# Patient Record
Sex: FEMALE | Race: WHITE | NOT HISPANIC OR LATINO | Employment: OTHER | RURAL
[De-identification: names, ages, dates, MRNs, and addresses within clinical notes are randomized per-mention and may not be internally consistent; named-entity substitution may affect disease eponyms.]

---

## 2021-05-25 DIAGNOSIS — M19.90 OSTEOARTHRITIS: Primary | ICD-10-CM

## 2021-06-03 ENCOUNTER — CLINICAL SUPPORT (OUTPATIENT)
Dept: REHABILITATION | Facility: HOSPITAL | Age: 86
End: 2021-06-03
Payer: MEDICARE

## 2021-06-03 DIAGNOSIS — M19.90 OSTEOARTHRITIS: Primary | ICD-10-CM

## 2021-06-03 DIAGNOSIS — M19.90 OSTEOARTHRITIS, UNSPECIFIED OSTEOARTHRITIS TYPE, UNSPECIFIED SITE: ICD-10-CM

## 2021-06-03 DIAGNOSIS — R26.9 GAIT DIFFICULTY: ICD-10-CM

## 2021-06-03 PROCEDURE — 97161 PT EVAL LOW COMPLEX 20 MIN: CPT

## 2021-06-15 ENCOUNTER — CLINICAL SUPPORT (OUTPATIENT)
Dept: REHABILITATION | Facility: HOSPITAL | Age: 86
End: 2021-06-15
Payer: MEDICARE

## 2021-06-15 DIAGNOSIS — R26.9 GAIT DIFFICULTY: ICD-10-CM

## 2021-06-15 DIAGNOSIS — M19.90 OSTEOARTHRITIS, UNSPECIFIED OSTEOARTHRITIS TYPE, UNSPECIFIED SITE: Primary | ICD-10-CM

## 2021-06-15 PROCEDURE — 97113 AQUATIC THERAPY/EXERCISES: CPT

## 2021-06-18 ENCOUNTER — CLINICAL SUPPORT (OUTPATIENT)
Dept: REHABILITATION | Facility: HOSPITAL | Age: 86
End: 2021-06-18
Payer: MEDICARE

## 2021-06-18 DIAGNOSIS — M19.90 OSTEOARTHRITIS, UNSPECIFIED OSTEOARTHRITIS TYPE, UNSPECIFIED SITE: Primary | ICD-10-CM

## 2021-06-18 DIAGNOSIS — R26.9 GAIT DIFFICULTY: ICD-10-CM

## 2021-06-18 PROCEDURE — 97113 AQUATIC THERAPY/EXERCISES: CPT

## 2021-08-25 ENCOUNTER — HOSPITAL ENCOUNTER (INPATIENT)
Facility: HOSPITAL | Age: 86
LOS: 4 days | Discharge: NURSING, REHAB OR LONG-TERM CARE | DRG: 872 | End: 2021-08-29
Attending: INTERNAL MEDICINE | Admitting: INTERNAL MEDICINE
Payer: MEDICARE

## 2021-08-25 DIAGNOSIS — I48.91 ATRIAL FIBRILLATION: ICD-10-CM

## 2021-08-25 DIAGNOSIS — A41.9 SEVERE SEPSIS: ICD-10-CM

## 2021-08-25 DIAGNOSIS — I50.9 CHF (CONGESTIVE HEART FAILURE): ICD-10-CM

## 2021-08-25 DIAGNOSIS — K81.9 CHOLECYSTITIS: ICD-10-CM

## 2021-08-25 DIAGNOSIS — R65.20 SEVERE SEPSIS: ICD-10-CM

## 2021-08-25 PROBLEM — R09.89 SUSPECTED DVT (DEEP VEIN THROMBOSIS): Status: ACTIVE | Noted: 2021-08-25

## 2021-08-25 LAB
ALBUMIN SERPL BCP-MCNC: 2.5 G/DL (ref 3.5–5)
ALBUMIN/GLOB SERPL: 0.7 {RATIO}
ALP SERPL-CCNC: 133 U/L (ref 55–142)
ALT SERPL W P-5'-P-CCNC: 73 U/L (ref 13–56)
ANION GAP SERPL CALCULATED.3IONS-SCNC: 16 MMOL/L (ref 7–16)
AST SERPL W P-5'-P-CCNC: 45 U/L (ref 15–37)
BASOPHILS # BLD AUTO: 0.05 K/UL (ref 0–0.2)
BASOPHILS NFR BLD AUTO: 0.2 % (ref 0–1)
BILIRUB SERPL-MCNC: 1.3 MG/DL (ref 0–1.2)
BUN SERPL-MCNC: 45 MG/DL (ref 7–18)
BUN/CREAT SERPL: 22 (ref 6–20)
CALCIUM SERPL-MCNC: 7.9 MG/DL (ref 8.5–10.1)
CHLORIDE SERPL-SCNC: 107 MMOL/L (ref 98–107)
CO2 SERPL-SCNC: 22 MMOL/L (ref 21–32)
CREAT SERPL-MCNC: 2.05 MG/DL (ref 0.55–1.02)
DIFFERENTIAL METHOD BLD: ABNORMAL
EOSINOPHIL # BLD AUTO: 0.26 K/UL (ref 0–0.5)
EOSINOPHIL NFR BLD AUTO: 1.3 % (ref 1–4)
ERYTHROCYTE [DISTWIDTH] IN BLOOD BY AUTOMATED COUNT: 14.4 % (ref 11.5–14.5)
GLOBULIN SER-MCNC: 3.8 G/DL (ref 2–4)
GLUCOSE SERPL-MCNC: 75 MG/DL (ref 74–106)
HCT VFR BLD AUTO: 34.4 % (ref 38–47)
HGB BLD-MCNC: 11.6 G/DL (ref 12–16)
IMM GRANULOCYTES # BLD AUTO: 4.21 K/UL (ref 0–0.04)
IMM GRANULOCYTES NFR BLD: 20.6 % (ref 0–0.4)
LACTATE SERPL-SCNC: 1.9 MMOL/L (ref 0.4–2)
LYMPHOCYTES # BLD AUTO: 0.9 K/UL (ref 1–4.8)
LYMPHOCYTES NFR BLD AUTO: 4.4 % (ref 27–41)
LYMPHOCYTES NFR BLD MANUAL: 9 % (ref 27–41)
MCH RBC QN AUTO: 29.7 PG (ref 27–31)
MCHC RBC AUTO-ENTMCNC: 33.7 G/DL (ref 32–36)
MCV RBC AUTO: 88.2 FL (ref 80–96)
MONOCYTES # BLD AUTO: 0.59 K/UL (ref 0–0.8)
MONOCYTES NFR BLD AUTO: 2.9 % (ref 2–6)
MONOCYTES NFR BLD MANUAL: 1 % (ref 2–6)
MPC BLD CALC-MCNC: 12 FL (ref 9.4–12.4)
NEUTROPHILS # BLD AUTO: 14.41 K/UL (ref 1.8–7.7)
NEUTROPHILS NFR BLD AUTO: 70.6 % (ref 53–65)
NEUTS BAND NFR BLD MANUAL: 25 % (ref 1–5)
NEUTS SEG NFR BLD MANUAL: 65 % (ref 50–62)
NRBC # BLD AUTO: 0 X10E3/UL
NRBC, AUTO (.00): 0 %
PLATELET # BLD AUTO: 47 K/UL (ref 150–400)
PLATELET MORPHOLOGY: ABNORMAL
POTASSIUM SERPL-SCNC: 3.5 MMOL/L (ref 3.5–5.1)
PROT SERPL-MCNC: 6.3 G/DL (ref 6.4–8.2)
RBC # BLD AUTO: 3.9 M/UL (ref 4.2–5.4)
RBC MORPH BLD: NORMAL
SODIUM SERPL-SCNC: 141 MMOL/L (ref 136–145)
WBC # BLD AUTO: 20.42 K/UL (ref 4.5–11)

## 2021-08-25 PROCEDURE — 11000001 HC ACUTE MED/SURG PRIVATE ROOM

## 2021-08-25 PROCEDURE — 99223 PR INITIAL HOSPITAL CARE,LEVL III: ICD-10-PCS | Mod: AI,,, | Performed by: HOSPITALIST

## 2021-08-25 PROCEDURE — 25000003 PHARM REV CODE 250: Performed by: HOSPITALIST

## 2021-08-25 PROCEDURE — 82247 BILIRUBIN TOTAL: CPT | Performed by: HOSPITALIST

## 2021-08-25 PROCEDURE — 63600175 PHARM REV CODE 636 W HCPCS: Performed by: HOSPITALIST

## 2021-08-25 PROCEDURE — 36415 COLL VENOUS BLD VENIPUNCTURE: CPT | Performed by: HOSPITALIST

## 2021-08-25 PROCEDURE — 83605 ASSAY OF LACTIC ACID: CPT | Performed by: HOSPITALIST

## 2021-08-25 PROCEDURE — 85025 COMPLETE CBC W/AUTO DIFF WBC: CPT | Performed by: HOSPITALIST

## 2021-08-25 PROCEDURE — 87040 BLOOD CULTURE FOR BACTERIA: CPT | Performed by: HOSPITALIST

## 2021-08-25 PROCEDURE — 99223 1ST HOSP IP/OBS HIGH 75: CPT | Mod: AI,,, | Performed by: HOSPITALIST

## 2021-08-25 RX ORDER — VERAPAMIL HYDROCHLORIDE 120 MG/1
120 TABLET, FILM COATED, EXTENDED RELEASE ORAL DAILY
Status: DISCONTINUED | OUTPATIENT
Start: 2021-08-26 | End: 2021-08-29 | Stop reason: HOSPADM

## 2021-08-25 RX ORDER — ONDANSETRON 2 MG/ML
4 INJECTION INTRAMUSCULAR; INTRAVENOUS EVERY 8 HOURS PRN
Status: DISCONTINUED | OUTPATIENT
Start: 2021-08-25 | End: 2021-08-26

## 2021-08-25 RX ORDER — PROMETHAZINE HYDROCHLORIDE 25 MG/1
25 TABLET ORAL EVERY 6 HOURS PRN
Status: DISCONTINUED | OUTPATIENT
Start: 2021-08-25 | End: 2021-08-26

## 2021-08-25 RX ORDER — OXYBUTYNIN CHLORIDE 5 MG/1
15 TABLET, EXTENDED RELEASE ORAL DAILY
Status: DISCONTINUED | OUTPATIENT
Start: 2021-08-26 | End: 2021-08-29 | Stop reason: HOSPADM

## 2021-08-25 RX ORDER — PANTOPRAZOLE SODIUM 40 MG/1
40 TABLET, DELAYED RELEASE ORAL DAILY
Status: DISCONTINUED | OUTPATIENT
Start: 2021-08-26 | End: 2021-08-29 | Stop reason: HOSPADM

## 2021-08-25 RX ORDER — METHENAMINE HIPPURATE 1000 MG/1
1 TABLET ORAL 2 TIMES DAILY
Status: DISCONTINUED | OUTPATIENT
Start: 2021-08-25 | End: 2021-08-27

## 2021-08-25 RX ORDER — KETOROLAC TROMETHAMINE 30 MG/ML
15 INJECTION, SOLUTION INTRAMUSCULAR; INTRAVENOUS EVERY 6 HOURS PRN
Status: DISCONTINUED | OUTPATIENT
Start: 2021-08-25 | End: 2021-08-26

## 2021-08-25 RX ORDER — SODIUM CHLORIDE, SODIUM LACTATE, POTASSIUM CHLORIDE, CALCIUM CHLORIDE 600; 310; 30; 20 MG/100ML; MG/100ML; MG/100ML; MG/100ML
INJECTION, SOLUTION INTRAVENOUS CONTINUOUS
Status: DISCONTINUED | OUTPATIENT
Start: 2021-08-25 | End: 2021-08-29 | Stop reason: HOSPADM

## 2021-08-25 RX ORDER — OXYBUTYNIN CHLORIDE 15 MG/1
15 TABLET, EXTENDED RELEASE ORAL DAILY
COMMUNITY

## 2021-08-25 RX ORDER — ESOMEPRAZOLE MAGNESIUM 40 MG/1
40 CAPSULE, DELAYED RELEASE ORAL DAILY
COMMUNITY
End: 2022-08-24

## 2021-08-25 RX ORDER — TALC
6 POWDER (GRAM) TOPICAL NIGHTLY PRN
Status: DISCONTINUED | OUTPATIENT
Start: 2021-08-25 | End: 2021-08-26

## 2021-08-25 RX ORDER — VERAPAMIL HYDROCHLORIDE 180 MG/1
180 CAPSULE, EXTENDED RELEASE ORAL DAILY
COMMUNITY

## 2021-08-25 RX ORDER — ACETAMINOPHEN 325 MG/1
650 TABLET ORAL EVERY 8 HOURS PRN
Status: DISCONTINUED | OUTPATIENT
Start: 2021-08-25 | End: 2021-08-26

## 2021-08-25 RX ORDER — CETIRIZINE HYDROCHLORIDE 10 MG/1
10 TABLET ORAL DAILY PRN
COMMUNITY

## 2021-08-25 RX ORDER — SODIUM CHLORIDE 0.9 % (FLUSH) 0.9 %
10 SYRINGE (ML) INJECTION
Status: DISCONTINUED | OUTPATIENT
Start: 2021-08-25 | End: 2021-08-29 | Stop reason: HOSPADM

## 2021-08-25 RX ORDER — CETIRIZINE HYDROCHLORIDE 10 MG/1
10 TABLET ORAL DAILY
Status: DISCONTINUED | OUTPATIENT
Start: 2021-08-26 | End: 2021-08-29 | Stop reason: HOSPADM

## 2021-08-25 RX ADMIN — PIPERACILLIN AND TAZOBACTAM 4.5 G: 4; .5 INJECTION, POWDER, LYOPHILIZED, FOR SOLUTION INTRAVENOUS; PARENTERAL at 10:08

## 2021-08-25 RX ADMIN — SODIUM CHLORIDE, POTASSIUM CHLORIDE, SODIUM LACTATE AND CALCIUM CHLORIDE: 600; 310; 30; 20 INJECTION, SOLUTION INTRAVENOUS at 11:08

## 2021-08-25 RX ADMIN — SODIUM CHLORIDE, POTASSIUM CHLORIDE, SODIUM LACTATE AND CALCIUM CHLORIDE 1000 ML: 600; 310; 30; 20 INJECTION, SOLUTION INTRAVENOUS at 09:08

## 2021-08-25 RX ADMIN — ACETAMINOPHEN 650 MG: 325 TABLET ORAL at 11:08

## 2021-08-26 PROBLEM — R65.20 SEVERE SEPSIS: Status: ACTIVE | Noted: 2021-08-26

## 2021-08-26 PROBLEM — A41.9 SEVERE SEPSIS: Status: ACTIVE | Noted: 2021-08-26

## 2021-08-26 PROBLEM — R94.31 PROLONGED Q-T INTERVAL ON ECG: Status: ACTIVE | Noted: 2021-08-26

## 2021-08-26 PROBLEM — E66.9 OBESITY: Status: ACTIVE | Noted: 2021-08-26

## 2021-08-26 PROBLEM — R74.8 ELEVATED LIVER ENZYMES: Status: ACTIVE | Noted: 2021-08-26

## 2021-08-26 PROBLEM — K80.20 CHOLELITHIASIS: Status: ACTIVE | Noted: 2021-08-26

## 2021-08-26 PROBLEM — I10 ESSENTIAL HYPERTENSION: Status: ACTIVE | Noted: 2021-08-26

## 2021-08-26 PROBLEM — E88.09 HYPOALBUMINEMIA: Status: ACTIVE | Noted: 2021-08-26

## 2021-08-26 PROBLEM — D68.9 COAGULOPATHY: Status: ACTIVE | Noted: 2021-08-26

## 2021-08-26 PROBLEM — D72.825 BANDEMIA: Status: ACTIVE | Noted: 2021-08-26

## 2021-08-26 PROBLEM — K21.9 GERD (GASTROESOPHAGEAL REFLUX DISEASE): Status: ACTIVE | Noted: 2021-08-26

## 2021-08-26 PROBLEM — D72.829 LEUKOCYTOSIS: Status: ACTIVE | Noted: 2021-08-26

## 2021-08-26 PROBLEM — N17.9 AKI (ACUTE KIDNEY INJURY): Status: ACTIVE | Noted: 2021-08-26

## 2021-08-26 PROBLEM — D69.6 THROMBOCYTOPENIA: Status: ACTIVE | Noted: 2021-08-26

## 2021-08-26 PROBLEM — D72.829 LEUKOCYTOSIS: Status: RESOLVED | Noted: 2021-08-26 | Resolved: 2021-08-26

## 2021-08-26 LAB
ALBUMIN SERPL BCP-MCNC: 2.3 G/DL (ref 3.5–5)
ALBUMIN/GLOB SERPL: 0.6 {RATIO}
ALP SERPL-CCNC: 140 U/L (ref 55–142)
ALT SERPL W P-5'-P-CCNC: 66 U/L (ref 13–56)
ANION GAP SERPL CALCULATED.3IONS-SCNC: 15 MMOL/L (ref 7–16)
APTT PPP: 67.7 SECONDS (ref 25.2–37.3)
AST SERPL W P-5'-P-CCNC: 37 U/L (ref 15–37)
BASOPHILS # BLD AUTO: 0.1 K/UL (ref 0–0.2)
BASOPHILS NFR BLD AUTO: 0.5 % (ref 0–1)
BILIRUB SERPL-MCNC: 1.4 MG/DL (ref 0–1.2)
BUN SERPL-MCNC: 45 MG/DL (ref 7–18)
BUN/CREAT SERPL: 22 (ref 6–20)
CALCIUM SERPL-MCNC: 7.6 MG/DL (ref 8.5–10.1)
CHLORIDE SERPL-SCNC: 108 MMOL/L (ref 98–107)
CO2 SERPL-SCNC: 23 MMOL/L (ref 21–32)
CREAT SERPL-MCNC: 2.08 MG/DL (ref 0.55–1.02)
DIFFERENTIAL METHOD BLD: ABNORMAL
EOSINOPHIL # BLD AUTO: 0.03 K/UL (ref 0–0.5)
EOSINOPHIL NFR BLD AUTO: 0.1 % (ref 1–4)
ERYTHROCYTE [DISTWIDTH] IN BLOOD BY AUTOMATED COUNT: 14.4 % (ref 11.5–14.5)
GLOBULIN SER-MCNC: 3.6 G/DL (ref 2–4)
GLUCOSE SERPL-MCNC: 68 MG/DL (ref 74–106)
HCT VFR BLD AUTO: 36 % (ref 38–47)
HGB BLD-MCNC: 11.7 G/DL (ref 12–16)
IMM GRANULOCYTES # BLD AUTO: 3.65 K/UL (ref 0–0.04)
IMM GRANULOCYTES NFR BLD: 17.9 % (ref 0–0.4)
INR BLD: 1.72 (ref 0.9–1.1)
LIPASE SERPL-CCNC: 71 U/L (ref 73–393)
LYMPHOCYTES # BLD AUTO: 0.99 K/UL (ref 1–4.8)
LYMPHOCYTES NFR BLD AUTO: 4.9 % (ref 27–41)
LYMPHOCYTES NFR BLD MANUAL: 2 % (ref 27–41)
MAGNESIUM SERPL-MCNC: 1.9 MG/DL (ref 1.7–2.3)
MCH RBC QN AUTO: 29.3 PG (ref 27–31)
MCHC RBC AUTO-ENTMCNC: 32.5 G/DL (ref 32–36)
MCV RBC AUTO: 90 FL (ref 80–96)
MONOCYTES # BLD AUTO: 0.66 K/UL (ref 0–0.8)
MONOCYTES NFR BLD AUTO: 3.2 % (ref 2–6)
MONOCYTES NFR BLD MANUAL: 2 % (ref 2–6)
MPC BLD CALC-MCNC: 11.3 FL (ref 9.4–12.4)
NEUTROPHILS # BLD AUTO: 14.91 K/UL (ref 1.8–7.7)
NEUTROPHILS NFR BLD AUTO: 73.4 % (ref 53–65)
NEUTS BAND NFR BLD MANUAL: 16 % (ref 1–5)
NEUTS SEG NFR BLD MANUAL: 80 % (ref 50–62)
NRBC # BLD AUTO: 0 X10E3/UL
NRBC, AUTO (.00): 0 %
NT-PROBNP SERPL-MCNC: 8077 PG/ML (ref 1–450)
PLATELET # BLD AUTO: 48 K/UL (ref 150–400)
PLATELET MORPHOLOGY: ABNORMAL
POTASSIUM SERPL-SCNC: 3.7 MMOL/L (ref 3.5–5.1)
PROT SERPL-MCNC: 5.9 G/DL (ref 6.4–8.2)
PROTHROMBIN TIME: 19.9 SECONDS (ref 11.7–14.7)
RBC # BLD AUTO: 4 M/UL (ref 4.2–5.4)
RBC MORPH BLD: NORMAL
SODIUM SERPL-SCNC: 142 MMOL/L (ref 136–145)
TROPONIN I SERPL-MCNC: <0.017 NG/ML
WBC # BLD AUTO: 20.34 K/UL (ref 4.5–11)

## 2021-08-26 PROCEDURE — 83690 ASSAY OF LIPASE: CPT | Performed by: HOSPITALIST

## 2021-08-26 PROCEDURE — 36415 COLL VENOUS BLD VENIPUNCTURE: CPT | Performed by: HOSPITALIST

## 2021-08-26 PROCEDURE — 80053 COMPREHEN METABOLIC PANEL: CPT | Performed by: HOSPITALIST

## 2021-08-26 PROCEDURE — 99232 SBSQ HOSP IP/OBS MODERATE 35: CPT | Mod: ,,, | Performed by: HOSPITALIST

## 2021-08-26 PROCEDURE — 83735 ASSAY OF MAGNESIUM: CPT | Performed by: HOSPITALIST

## 2021-08-26 PROCEDURE — 94761 N-INVAS EAR/PLS OXIMETRY MLT: CPT

## 2021-08-26 PROCEDURE — 63600175 PHARM REV CODE 636 W HCPCS: Performed by: HOSPITALIST

## 2021-08-26 PROCEDURE — 93005 ELECTROCARDIOGRAM TRACING: CPT

## 2021-08-26 PROCEDURE — 93010 ELECTROCARDIOGRAM REPORT: CPT | Mod: ,,, | Performed by: HOSPITALIST

## 2021-08-26 PROCEDURE — 93010 EKG 12-LEAD: ICD-10-PCS | Mod: ,,, | Performed by: HOSPITALIST

## 2021-08-26 PROCEDURE — 84484 ASSAY OF TROPONIN QUANT: CPT | Performed by: HOSPITALIST

## 2021-08-26 PROCEDURE — 83880 ASSAY OF NATRIURETIC PEPTIDE: CPT | Performed by: HOSPITALIST

## 2021-08-26 PROCEDURE — 25000003 PHARM REV CODE 250: Performed by: HOSPITALIST

## 2021-08-26 PROCEDURE — 99232 PR SUBSEQUENT HOSPITAL CARE,LEVL II: ICD-10-PCS | Mod: ,,, | Performed by: HOSPITALIST

## 2021-08-26 PROCEDURE — 85730 THROMBOPLASTIN TIME PARTIAL: CPT | Performed by: HOSPITALIST

## 2021-08-26 PROCEDURE — 11000001 HC ACUTE MED/SURG PRIVATE ROOM

## 2021-08-26 PROCEDURE — 85025 COMPLETE CBC W/AUTO DIFF WBC: CPT | Performed by: HOSPITALIST

## 2021-08-26 PROCEDURE — 85610 PROTHROMBIN TIME: CPT | Performed by: HOSPITALIST

## 2021-08-26 RX ORDER — BISACODYL 5 MG
10 TABLET, DELAYED RELEASE (ENTERIC COATED) ORAL DAILY PRN
Status: DISCONTINUED | OUTPATIENT
Start: 2021-08-26 | End: 2021-08-29 | Stop reason: HOSPADM

## 2021-08-26 RX ORDER — SIMETHICONE 80 MG
1 TABLET,CHEWABLE ORAL 3 TIMES DAILY PRN
Status: DISCONTINUED | OUTPATIENT
Start: 2021-08-26 | End: 2021-08-29 | Stop reason: HOSPADM

## 2021-08-26 RX ORDER — TRAZODONE HYDROCHLORIDE 50 MG/1
50 TABLET ORAL NIGHTLY PRN
Status: DISCONTINUED | OUTPATIENT
Start: 2021-08-26 | End: 2021-08-29 | Stop reason: HOSPADM

## 2021-08-26 RX ORDER — ONDANSETRON 2 MG/ML
8 INJECTION INTRAMUSCULAR; INTRAVENOUS EVERY 6 HOURS PRN
Status: DISCONTINUED | OUTPATIENT
Start: 2021-08-26 | End: 2021-08-29 | Stop reason: HOSPADM

## 2021-08-26 RX ORDER — GUAIFENESIN/DEXTROMETHORPHAN 100-10MG/5
10 SYRUP ORAL EVERY 6 HOURS PRN
Status: DISCONTINUED | OUTPATIENT
Start: 2021-08-26 | End: 2021-08-29 | Stop reason: HOSPADM

## 2021-08-26 RX ORDER — ACETAMINOPHEN 500 MG
1000 TABLET ORAL EVERY 6 HOURS PRN
Status: DISCONTINUED | OUTPATIENT
Start: 2021-08-26 | End: 2021-08-29 | Stop reason: HOSPADM

## 2021-08-26 RX ADMIN — PIPERACILLIN AND TAZOBACTAM 4.5 G: 4; .5 INJECTION, POWDER, LYOPHILIZED, FOR SOLUTION INTRAVENOUS; PARENTERAL at 11:08

## 2021-08-26 RX ADMIN — PIPERACILLIN AND TAZOBACTAM 4.5 G: 4; .5 INJECTION, POWDER, LYOPHILIZED, FOR SOLUTION INTRAVENOUS; PARENTERAL at 06:08

## 2021-08-26 RX ADMIN — OXYBUTYNIN CHLORIDE 15 MG: 5 TABLET, EXTENDED RELEASE ORAL at 06:08

## 2021-08-26 RX ADMIN — CETIRIZINE HYDROCHLORIDE 10 MG: 10 TABLET, FILM COATED ORAL at 06:08

## 2021-08-26 RX ADMIN — METHENAMINE HIPPURATE 1 G: 1000 TABLET ORAL at 06:08

## 2021-08-26 RX ADMIN — PANTOPRAZOLE SODIUM 40 MG: 40 TABLET, DELAYED RELEASE ORAL at 06:08

## 2021-08-26 RX ADMIN — SODIUM CHLORIDE, POTASSIUM CHLORIDE, SODIUM LACTATE AND CALCIUM CHLORIDE: 600; 310; 30; 20 INJECTION, SOLUTION INTRAVENOUS at 06:08

## 2021-08-26 RX ADMIN — METHENAMINE HIPPURATE 1 G: 1000 TABLET ORAL at 09:08

## 2021-08-26 RX ADMIN — ACETAMINOPHEN 1000 MG: 500 TABLET ORAL at 09:08

## 2021-08-26 RX ADMIN — VERAPAMIL HYDROCHLORIDE 120 MG: 120 TABLET, FILM COATED, EXTENDED RELEASE ORAL at 06:08

## 2021-08-26 RX ADMIN — PIPERACILLIN AND TAZOBACTAM 4.5 G: 4; .5 INJECTION, POWDER, LYOPHILIZED, FOR SOLUTION INTRAVENOUS; PARENTERAL at 02:08

## 2021-08-27 PROBLEM — I50.9 CHF (CONGESTIVE HEART FAILURE): Status: ACTIVE | Noted: 2021-08-27

## 2021-08-27 PROBLEM — N13.9 OBSTRUCTIVE UROPATHY: Status: ACTIVE | Noted: 2021-08-27

## 2021-08-27 LAB
ANION GAP SERPL CALCULATED.3IONS-SCNC: 13 MMOL/L (ref 7–16)
AORTIC ROOT ANNULUS: 2.9 CM
AORTIC VALVE CUSP SEPERATION: 1.64 CM
AV INDEX (PROSTH): 0.48
AV MEAN GRADIENT: 5 MMHG
AV PEAK GRADIENT: 8 MMHG
AV VALVE AREA: 1.51 CM2
AV VELOCITY RATIO: 0.43
BACTERIA #/AREA URNS HPF: ABNORMAL /HPF
BASOPHILS # BLD AUTO: 0.12 K/UL (ref 0–0.2)
BASOPHILS NFR BLD AUTO: 0.6 % (ref 0–1)
BILIRUB UR QL STRIP: NEGATIVE
BSA FOR ECHO PROCEDURE: 1.93 M2
BUN SERPL-MCNC: 37 MG/DL (ref 7–18)
BUN/CREAT SERPL: 21 (ref 6–20)
CALCIUM SERPL-MCNC: 8.4 MG/DL (ref 8.5–10.1)
CHLORIDE SERPL-SCNC: 110 MMOL/L (ref 98–107)
CLARITY UR: CLEAR
CO2 SERPL-SCNC: 24 MMOL/L (ref 21–32)
COLOR UR: ABNORMAL
CREAT SERPL-MCNC: 1.78 MG/DL (ref 0.55–1.02)
CV ECHO LV RWT: 0.28 CM
D DIMER PPP FEU-MCNC: 5.19 ΜG/ML (ref 0–0.47)
DIFFERENTIAL METHOD BLD: ABNORMAL
DOP CALC AO PEAK VEL: 1.4 M/S
DOP CALC AO VTI: 27 CM
DOP CALC LVOT AREA: 3.1 CM2
DOP CALC LVOT DIAMETER: 2 CM
DOP CALC LVOT PEAK VEL: 0.6 M/S
DOP CALC LVOT STROKE VOLUME: 40.82 CM3
DOP CALCLVOT PEAK VEL VTI: 13 CM
E WAVE DECELERATION TIME: 158 MSEC
ECHO EF ESTIMATED: 55 %
ECHO LV POSTERIOR WALL: 0.7 CM (ref 0.6–1.1)
EJECTION FRACTION: 55 %
EOSINOPHIL # BLD AUTO: 0.17 K/UL (ref 0–0.5)
EOSINOPHIL NFR BLD AUTO: 0.8 % (ref 1–4)
ERYTHROCYTE [DISTWIDTH] IN BLOOD BY AUTOMATED COUNT: 14.1 % (ref 11.5–14.5)
FRACTIONAL SHORTENING: 29 % (ref 28–44)
GLUCOSE SERPL-MCNC: 65 MG/DL (ref 74–106)
GLUCOSE UR STRIP-MCNC: NEGATIVE MG/DL
HAV IGM SER QL: NORMAL
HBV CORE IGM SER QL: NORMAL
HBV SURFACE AG SERPL QL IA: NORMAL
HCT VFR BLD AUTO: 35.6 % (ref 38–47)
HCV AB SER QL: NORMAL
HGB BLD-MCNC: 11.7 G/DL (ref 12–16)
IMM GRANULOCYTES # BLD AUTO: 0.19 K/UL (ref 0–0.04)
IMM GRANULOCYTES NFR BLD: 0.9 % (ref 0–0.4)
INR BLD: 1.37 (ref 0.9–1.1)
INTERVENTRICULAR SEPTUM: 0.74 CM (ref 0.6–1.1)
IVC OSTIUM: 1.8 CM
KETONES UR STRIP-SCNC: NEGATIVE MG/DL
LEFT ATRIUM SIZE: 3 CM
LEFT INTERNAL DIMENSION IN SYSTOLE: 3.59 CM (ref 2.1–4)
LEFT VENTRICLE MASS INDEX: 67 G/M2
LEFT VENTRICULAR INTERNAL DIMENSION IN DIASTOLE: 5.09 CM (ref 3.5–6)
LEFT VENTRICULAR MASS: 122.55 G
LEUKOCYTE ESTERASE UR QL STRIP: ABNORMAL
LVOT MG: 1 MMHG
LYMPHOCYTES # BLD AUTO: 1.19 K/UL (ref 1–4.8)
LYMPHOCYTES NFR BLD AUTO: 5.7 % (ref 27–41)
MAGNESIUM SERPL-MCNC: 2 MG/DL (ref 1.7–2.3)
MCH RBC QN AUTO: 29.3 PG (ref 27–31)
MCHC RBC AUTO-ENTMCNC: 32.9 G/DL (ref 32–36)
MCV RBC AUTO: 89.2 FL (ref 80–96)
MONOCYTES # BLD AUTO: 0.79 K/UL (ref 0–0.8)
MONOCYTES NFR BLD AUTO: 3.8 % (ref 2–6)
MPC BLD CALC-MCNC: 11.4 FL (ref 9.4–12.4)
MUCOUS THREADS #/AREA URNS HPF: ABNORMAL /HPF
MV PEAK E VEL: 0.76 M/S
NEUTROPHILS # BLD AUTO: 18.26 K/UL (ref 1.8–7.7)
NEUTROPHILS NFR BLD AUTO: 88.2 % (ref 53–65)
NITRITE UR QL STRIP: NEGATIVE
NRBC # BLD AUTO: 0 X10E3/UL
NRBC, AUTO (.00): 0 %
PATH REV BLD -IMP: NORMAL
PH UR STRIP: 6 PH UNITS
PISA TR MAX VEL: 2.7 M/S
PLATELET # BLD AUTO: 65 K/UL (ref 150–400)
POTASSIUM SERPL-SCNC: 3.4 MMOL/L (ref 3.5–5.1)
PROT UR QL STRIP: ABNORMAL
PROTHROMBIN TIME: 16.8 SECONDS (ref 11.7–14.7)
RA MAJOR: 4.3 CM
RA PRESSURE: 8 MMHG
RBC # BLD AUTO: 3.99 M/UL (ref 4.2–5.4)
RBC # UR STRIP: ABNORMAL /UL
RBC #/AREA URNS HPF: ABNORMAL /HPF
RIGHT VENTRICULAR END-DIASTOLIC DIMENSION: 3.6 CM
SODIUM SERPL-SCNC: 144 MMOL/L (ref 136–145)
SP GR UR STRIP: <=1.005
SQUAMOUS #/AREA URNS LPF: ABNORMAL /LPF
TR MAX PG: 29 MMHG
TRICHOMONAS #/AREA URNS HPF: ABNORMAL /HPF
TRICUSPID ANNULAR PLANE SYSTOLIC EXCURSION: 2.2 CM
TV REST PULMONARY ARTERY PRESSURE: 37 MMHG
UROBILINOGEN UR STRIP-ACNC: 4 MG/DL
WBC # BLD AUTO: 20.72 K/UL (ref 4.5–11)
WBC #/AREA URNS HPF: ABNORMAL /HPF
YEAST #/AREA URNS HPF: ABNORMAL /HPF

## 2021-08-27 PROCEDURE — 25000003 PHARM REV CODE 250: Performed by: HOSPITALIST

## 2021-08-27 PROCEDURE — 85610 PROTHROMBIN TIME: CPT | Performed by: NURSE PRACTITIONER

## 2021-08-27 PROCEDURE — 81003 URINALYSIS AUTO W/O SCOPE: CPT | Performed by: HOSPITALIST

## 2021-08-27 PROCEDURE — 63600175 PHARM REV CODE 636 W HCPCS: Performed by: HOSPITALIST

## 2021-08-27 PROCEDURE — 11000001 HC ACUTE MED/SURG PRIVATE ROOM

## 2021-08-27 PROCEDURE — 85025 COMPLETE CBC W/AUTO DIFF WBC: CPT | Performed by: HOSPITALIST

## 2021-08-27 PROCEDURE — 84145 PROCALCITONIN (PCT): CPT | Mod: 90 | Performed by: HOSPITALIST

## 2021-08-27 PROCEDURE — 85378 FIBRIN DEGRADE SEMIQUANT: CPT | Performed by: HOSPITALIST

## 2021-08-27 PROCEDURE — 80048 BASIC METABOLIC PNL TOTAL CA: CPT | Performed by: HOSPITALIST

## 2021-08-27 PROCEDURE — 80074 ACUTE HEPATITIS PANEL: CPT | Performed by: HOSPITALIST

## 2021-08-27 PROCEDURE — 99232 SBSQ HOSP IP/OBS MODERATE 35: CPT | Mod: ,,, | Performed by: NURSE PRACTITIONER

## 2021-08-27 PROCEDURE — 36415 COLL VENOUS BLD VENIPUNCTURE: CPT | Performed by: HOSPITALIST

## 2021-08-27 PROCEDURE — 99232 PR SUBSEQUENT HOSPITAL CARE,LEVL II: ICD-10-PCS | Mod: ,,, | Performed by: NURSE PRACTITIONER

## 2021-08-27 PROCEDURE — 83735 ASSAY OF MAGNESIUM: CPT | Performed by: HOSPITALIST

## 2021-08-27 PROCEDURE — 99232 SBSQ HOSP IP/OBS MODERATE 35: CPT | Mod: ,,, | Performed by: HOSPITALIST

## 2021-08-27 PROCEDURE — 81001 URINALYSIS AUTO W/SCOPE: CPT | Performed by: HOSPITALIST

## 2021-08-27 PROCEDURE — 99232 PR SUBSEQUENT HOSPITAL CARE,LEVL II: ICD-10-PCS | Mod: ,,, | Performed by: HOSPITALIST

## 2021-08-27 RX ORDER — HEPARIN SODIUM 5000 [USP'U]/ML
5000 INJECTION, SOLUTION INTRAVENOUS; SUBCUTANEOUS EVERY 12 HOURS
Status: DISCONTINUED | OUTPATIENT
Start: 2021-08-27 | End: 2021-08-28

## 2021-08-27 RX ORDER — POTASSIUM CHLORIDE 20 MEQ/1
40 TABLET, EXTENDED RELEASE ORAL ONCE
Status: COMPLETED | OUTPATIENT
Start: 2021-08-27 | End: 2021-08-27

## 2021-08-27 RX ORDER — POTASSIUM CHLORIDE 7.45 MG/ML
20 INJECTION INTRAVENOUS ONCE
Status: COMPLETED | OUTPATIENT
Start: 2021-08-27 | End: 2021-08-27

## 2021-08-27 RX ADMIN — PIPERACILLIN AND TAZOBACTAM 4.5 G: 4; .5 INJECTION, POWDER, LYOPHILIZED, FOR SOLUTION INTRAVENOUS; PARENTERAL at 11:08

## 2021-08-27 RX ADMIN — OXYBUTYNIN CHLORIDE 15 MG: 5 TABLET, EXTENDED RELEASE ORAL at 12:08

## 2021-08-27 RX ADMIN — SODIUM CHLORIDE, POTASSIUM CHLORIDE, SODIUM LACTATE AND CALCIUM CHLORIDE: 600; 310; 30; 20 INJECTION, SOLUTION INTRAVENOUS at 08:08

## 2021-08-27 RX ADMIN — HEPARIN SODIUM 5000 UNITS: 5000 INJECTION INTRAVENOUS; SUBCUTANEOUS at 09:08

## 2021-08-27 RX ADMIN — POTASSIUM CHLORIDE 40 MEQ: 1500 TABLET, EXTENDED RELEASE ORAL at 01:08

## 2021-08-27 RX ADMIN — PIPERACILLIN AND TAZOBACTAM 4.5 G: 4; .5 INJECTION, POWDER, LYOPHILIZED, FOR SOLUTION INTRAVENOUS; PARENTERAL at 03:08

## 2021-08-27 RX ADMIN — CETIRIZINE HYDROCHLORIDE 10 MG: 10 TABLET, FILM COATED ORAL at 12:08

## 2021-08-27 RX ADMIN — POTASSIUM CHLORIDE 20 MEQ: 7.46 INJECTION, SOLUTION INTRAVENOUS at 01:08

## 2021-08-27 RX ADMIN — PANTOPRAZOLE SODIUM 40 MG: 40 TABLET, DELAYED RELEASE ORAL at 12:08

## 2021-08-28 LAB
ALBUMIN SERPL BCP-MCNC: 2.1 G/DL (ref 3.5–5)
ALBUMIN/GLOB SERPL: 0.6 {RATIO}
ALP SERPL-CCNC: 201 U/L (ref 55–142)
ALT SERPL W P-5'-P-CCNC: 32 U/L (ref 13–56)
ANION GAP SERPL CALCULATED.3IONS-SCNC: 15 MMOL/L (ref 7–16)
AST SERPL W P-5'-P-CCNC: 23 U/L (ref 15–37)
BASOPHILS # BLD AUTO: 0.06 K/UL (ref 0–0.2)
BASOPHILS NFR BLD AUTO: 0.5 % (ref 0–1)
BILIRUB SERPL-MCNC: 1.1 MG/DL (ref 0–1.2)
BUN SERPL-MCNC: 27 MG/DL (ref 7–18)
BUN/CREAT SERPL: 19 (ref 6–20)
CALCIUM SERPL-MCNC: 8.6 MG/DL (ref 8.5–10.1)
CHLORIDE SERPL-SCNC: 109 MMOL/L (ref 98–107)
CO2 SERPL-SCNC: 20 MMOL/L (ref 21–32)
CREAT SERPL-MCNC: 1.42 MG/DL (ref 0.55–1.02)
DIFFERENTIAL METHOD BLD: ABNORMAL
EOSINOPHIL # BLD AUTO: 0.18 K/UL (ref 0–0.5)
EOSINOPHIL NFR BLD AUTO: 1.4 % (ref 1–4)
ERYTHROCYTE [DISTWIDTH] IN BLOOD BY AUTOMATED COUNT: 14 % (ref 11.5–14.5)
FLUAV AG UPPER RESP QL IA.RAPID: NEGATIVE
FLUBV AG UPPER RESP QL IA.RAPID: NEGATIVE
GLOBULIN SER-MCNC: 3.7 G/DL (ref 2–4)
GLUCOSE SERPL-MCNC: 111 MG/DL (ref 74–106)
HCT VFR BLD AUTO: 35.4 % (ref 38–47)
HGB BLD-MCNC: 11.5 G/DL (ref 12–16)
IMM GRANULOCYTES # BLD AUTO: 0.39 K/UL (ref 0–0.04)
IMM GRANULOCYTES NFR BLD: 3.1 % (ref 0–0.4)
LYMPHOCYTES # BLD AUTO: 1.35 K/UL (ref 1–4.8)
LYMPHOCYTES NFR BLD AUTO: 10.7 % (ref 27–41)
MAGNESIUM SERPL-MCNC: 2 MG/DL (ref 1.7–2.3)
MCH RBC QN AUTO: 29 PG (ref 27–31)
MCHC RBC AUTO-ENTMCNC: 32.5 G/DL (ref 32–36)
MCV RBC AUTO: 89.4 FL (ref 80–96)
MONOCYTES # BLD AUTO: 0.99 K/UL (ref 0–0.8)
MONOCYTES NFR BLD AUTO: 7.9 % (ref 2–6)
MPC BLD CALC-MCNC: 11.8 FL (ref 9.4–12.4)
NEUTROPHILS # BLD AUTO: 9.61 K/UL (ref 1.8–7.7)
NEUTROPHILS NFR BLD AUTO: 76.4 % (ref 53–65)
NRBC # BLD AUTO: 0 X10E3/UL
NRBC, AUTO (.00): 0 %
PLATELET # BLD AUTO: 79 K/UL (ref 150–400)
PLATELET MORPHOLOGY: ABNORMAL
POTASSIUM SERPL-SCNC: 3.8 MMOL/L (ref 3.5–5.1)
PROT SERPL-MCNC: 5.8 G/DL (ref 6.4–8.2)
RBC # BLD AUTO: 3.96 M/UL (ref 4.2–5.4)
RBC MORPH BLD: NORMAL
SARS-COV+SARS-COV-2 AG RESP QL IA.RAPID: NEGATIVE
SODIUM SERPL-SCNC: 140 MMOL/L (ref 136–145)
WBC # BLD AUTO: 12.58 K/UL (ref 4.5–11)

## 2021-08-28 PROCEDURE — 99232 SBSQ HOSP IP/OBS MODERATE 35: CPT | Mod: ,,, | Performed by: HOSPITALIST

## 2021-08-28 PROCEDURE — 11000001 HC ACUTE MED/SURG PRIVATE ROOM

## 2021-08-28 PROCEDURE — 87428 SARSCOV & INF VIR A&B AG IA: CPT | Performed by: HOSPITALIST

## 2021-08-28 PROCEDURE — 85025 COMPLETE CBC W/AUTO DIFF WBC: CPT | Performed by: HOSPITALIST

## 2021-08-28 PROCEDURE — 25000003 PHARM REV CODE 250: Performed by: HOSPITALIST

## 2021-08-28 PROCEDURE — 83735 ASSAY OF MAGNESIUM: CPT | Performed by: HOSPITALIST

## 2021-08-28 PROCEDURE — 99233 SBSQ HOSP IP/OBS HIGH 50: CPT | Mod: ,,, | Performed by: SURGERY

## 2021-08-28 PROCEDURE — 63600175 PHARM REV CODE 636 W HCPCS: Performed by: HOSPITALIST

## 2021-08-28 PROCEDURE — 99233 PR SUBSEQUENT HOSPITAL CARE,LEVL III: ICD-10-PCS | Mod: ,,, | Performed by: SURGERY

## 2021-08-28 PROCEDURE — 99232 PR SUBSEQUENT HOSPITAL CARE,LEVL II: ICD-10-PCS | Mod: ,,, | Performed by: HOSPITALIST

## 2021-08-28 PROCEDURE — 80053 COMPREHEN METABOLIC PANEL: CPT | Performed by: HOSPITALIST

## 2021-08-28 PROCEDURE — 94761 N-INVAS EAR/PLS OXIMETRY MLT: CPT

## 2021-08-28 PROCEDURE — 36415 COLL VENOUS BLD VENIPUNCTURE: CPT | Performed by: HOSPITALIST

## 2021-08-28 RX ORDER — GUAIFENESIN/DEXTROMETHORPHAN 100-10MG/5
5 SYRUP ORAL EVERY 4 HOURS PRN
Status: DISCONTINUED | OUTPATIENT
Start: 2021-08-28 | End: 2021-08-29 | Stop reason: HOSPADM

## 2021-08-28 RX ADMIN — PIPERACILLIN AND TAZOBACTAM 4.5 G: 4; .5 INJECTION, POWDER, LYOPHILIZED, FOR SOLUTION INTRAVENOUS; PARENTERAL at 10:08

## 2021-08-28 RX ADMIN — PANTOPRAZOLE SODIUM 40 MG: 40 TABLET, DELAYED RELEASE ORAL at 10:08

## 2021-08-28 RX ADMIN — CETIRIZINE HYDROCHLORIDE 10 MG: 10 TABLET, FILM COATED ORAL at 10:08

## 2021-08-28 RX ADMIN — GUAIFENESIN AND DEXTROMETHORPHAN 10 ML: 100; 10 SYRUP ORAL at 04:08

## 2021-08-28 RX ADMIN — PIPERACILLIN AND TAZOBACTAM 4.5 G: 4; .5 INJECTION, POWDER, LYOPHILIZED, FOR SOLUTION INTRAVENOUS; PARENTERAL at 02:08

## 2021-08-28 RX ADMIN — VERAPAMIL HYDROCHLORIDE 120 MG: 120 TABLET, FILM COATED, EXTENDED RELEASE ORAL at 10:08

## 2021-08-28 RX ADMIN — PIPERACILLIN AND TAZOBACTAM 4.5 G: 4; .5 INJECTION, POWDER, LYOPHILIZED, FOR SOLUTION INTRAVENOUS; PARENTERAL at 05:08

## 2021-08-28 RX ADMIN — HEPARIN SODIUM 5000 UNITS: 5000 INJECTION INTRAVENOUS; SUBCUTANEOUS at 10:08

## 2021-08-28 RX ADMIN — OXYBUTYNIN CHLORIDE 15 MG: 5 TABLET, EXTENDED RELEASE ORAL at 10:08

## 2021-08-29 ENCOUNTER — HOSPITAL ENCOUNTER (INPATIENT)
Facility: HOSPITAL | Age: 86
LOS: 10 days | Discharge: HOME | DRG: 872 | End: 2021-09-08
Attending: INTERNAL MEDICINE | Admitting: INTERNAL MEDICINE
Payer: MEDICARE

## 2021-08-29 VITALS
SYSTOLIC BLOOD PRESSURE: 122 MMHG | DIASTOLIC BLOOD PRESSURE: 70 MMHG | WEIGHT: 194 LBS | OXYGEN SATURATION: 94 % | TEMPERATURE: 98 F | RESPIRATION RATE: 18 BRPM | HEART RATE: 78 BPM | BODY MASS INDEX: 38.09 KG/M2 | HEIGHT: 60 IN

## 2021-08-29 DIAGNOSIS — I50.9 CONGESTIVE HEART FAILURE, UNSPECIFIED HF CHRONICITY, UNSPECIFIED HEART FAILURE TYPE: ICD-10-CM

## 2021-08-29 DIAGNOSIS — A41.9 SEVERE SEPSIS: ICD-10-CM

## 2021-08-29 DIAGNOSIS — K21.9 GASTROESOPHAGEAL REFLUX DISEASE, UNSPECIFIED WHETHER ESOPHAGITIS PRESENT: ICD-10-CM

## 2021-08-29 DIAGNOSIS — E87.6 HYPOKALEMIA: Primary | ICD-10-CM

## 2021-08-29 DIAGNOSIS — R94.31 PROLONGED Q-T INTERVAL ON ECG: ICD-10-CM

## 2021-08-29 DIAGNOSIS — D68.9 COAGULOPATHY: ICD-10-CM

## 2021-08-29 DIAGNOSIS — D72.829 LEUKOCYTOSIS, UNSPECIFIED TYPE: ICD-10-CM

## 2021-08-29 DIAGNOSIS — K80.20 CHOLELITHIASIS WITHOUT CHOLECYSTITIS: ICD-10-CM

## 2021-08-29 DIAGNOSIS — E88.09 HYPOALBUMINEMIA: ICD-10-CM

## 2021-08-29 DIAGNOSIS — R65.20 SEVERE SEPSIS: ICD-10-CM

## 2021-08-29 DIAGNOSIS — R26.9 GAIT DIFFICULTY: ICD-10-CM

## 2021-08-29 DIAGNOSIS — M19.90 OSTEOARTHRITIS, UNSPECIFIED OSTEOARTHRITIS TYPE, UNSPECIFIED SITE: ICD-10-CM

## 2021-08-29 DIAGNOSIS — N30.00 ACUTE CYSTITIS WITHOUT HEMATURIA: ICD-10-CM

## 2021-08-29 DIAGNOSIS — A41.9 SEVERE SEPSIS WITH ACUTE ORGAN DYSFUNCTION: ICD-10-CM

## 2021-08-29 DIAGNOSIS — E66.9 OBESITY, UNSPECIFIED CLASSIFICATION, UNSPECIFIED OBESITY TYPE, UNSPECIFIED WHETHER SERIOUS COMORBIDITY PRESENT: ICD-10-CM

## 2021-08-29 DIAGNOSIS — R74.8 ELEVATED LIVER ENZYMES: ICD-10-CM

## 2021-08-29 DIAGNOSIS — D72.825 BANDEMIA: ICD-10-CM

## 2021-08-29 DIAGNOSIS — I10 ESSENTIAL HYPERTENSION: ICD-10-CM

## 2021-08-29 DIAGNOSIS — D69.6 THROMBOCYTOPENIA: ICD-10-CM

## 2021-08-29 DIAGNOSIS — R65.20 SEVERE SEPSIS WITH ACUTE ORGAN DYSFUNCTION: ICD-10-CM

## 2021-08-29 DIAGNOSIS — N17.9 AKI (ACUTE KIDNEY INJURY): ICD-10-CM

## 2021-08-29 DIAGNOSIS — N13.9 OBSTRUCTIVE UROPATHY: ICD-10-CM

## 2021-08-29 DIAGNOSIS — N20.0 NEPHROLITHIASIS: ICD-10-CM

## 2021-08-29 LAB
ALBUMIN SERPL BCP-MCNC: 2.1 G/DL (ref 3.5–5)
ALBUMIN/GLOB SERPL: 0.6 {RATIO}
ALP SERPL-CCNC: 169 U/L (ref 55–142)
ALT SERPL W P-5'-P-CCNC: 27 U/L (ref 13–56)
ANION GAP SERPL CALCULATED.3IONS-SCNC: 14 MMOL/L (ref 7–16)
AST SERPL W P-5'-P-CCNC: 18 U/L (ref 15–37)
BASOPHILS # BLD AUTO: 0.07 K/UL (ref 0–0.2)
BASOPHILS NFR BLD AUTO: 0.6 % (ref 0–1)
BILIRUB SERPL-MCNC: 1 MG/DL (ref 0–1.2)
BUN SERPL-MCNC: 21 MG/DL (ref 7–18)
BUN/CREAT SERPL: 16 (ref 6–20)
CALCIUM SERPL-MCNC: 8.9 MG/DL (ref 8.5–10.1)
CHLORIDE SERPL-SCNC: 111 MMOL/L (ref 98–107)
CO2 SERPL-SCNC: 22 MMOL/L (ref 21–32)
CREAT SERPL-MCNC: 1.3 MG/DL (ref 0.55–1.02)
DIFFERENTIAL METHOD BLD: ABNORMAL
EOSINOPHIL # BLD AUTO: 0.38 K/UL (ref 0–0.5)
EOSINOPHIL NFR BLD AUTO: 3.3 % (ref 1–4)
EOSINOPHIL NFR BLD MANUAL: 5 % (ref 1–4)
ERYTHROCYTE [DISTWIDTH] IN BLOOD BY AUTOMATED COUNT: 14.1 % (ref 11.5–14.5)
GLOBULIN SER-MCNC: 3.6 G/DL (ref 2–4)
GLUCOSE SERPL-MCNC: 119 MG/DL (ref 74–106)
HCT VFR BLD AUTO: 36.4 % (ref 38–47)
HGB BLD-MCNC: 12.2 G/DL (ref 12–16)
IMM GRANULOCYTES # BLD AUTO: 0.63 K/UL (ref 0–0.04)
IMM GRANULOCYTES NFR BLD: 5.4 % (ref 0–0.4)
LYMPHOCYTES # BLD AUTO: 1.35 K/UL (ref 1–4.8)
LYMPHOCYTES NFR BLD AUTO: 11.6 % (ref 27–41)
LYMPHOCYTES NFR BLD MANUAL: 6 % (ref 27–41)
MAGNESIUM SERPL-MCNC: 2 MG/DL (ref 1.7–2.3)
MCH RBC QN AUTO: 29.5 PG (ref 27–31)
MCHC RBC AUTO-ENTMCNC: 33.5 G/DL (ref 32–36)
MCV RBC AUTO: 87.9 FL (ref 80–96)
MONOCYTES # BLD AUTO: 1.14 K/UL (ref 0–0.8)
MONOCYTES NFR BLD AUTO: 9.8 % (ref 2–6)
MONOCYTES NFR BLD MANUAL: 8 % (ref 2–6)
MPC BLD CALC-MCNC: 11.1 FL (ref 9.4–12.4)
MYELOCYTES NFR BLD MANUAL: 1 %
NEUTROPHILS # BLD AUTO: 8.04 K/UL (ref 1.8–7.7)
NEUTROPHILS NFR BLD AUTO: 69.3 % (ref 53–65)
NEUTS BAND NFR BLD MANUAL: 5 % (ref 1–5)
NEUTS SEG NFR BLD MANUAL: 75 % (ref 50–62)
NRBC # BLD AUTO: 0 X10E3/UL
NRBC, AUTO (.00): 0 %
PLATELET # BLD AUTO: 99 K/UL (ref 150–400)
PLATELET MORPHOLOGY: ABNORMAL
POTASSIUM SERPL-SCNC: 3.7 MMOL/L (ref 3.5–5.1)
PROT SERPL-MCNC: 5.7 G/DL (ref 6.4–8.2)
RBC # BLD AUTO: 4.14 M/UL (ref 4.2–5.4)
RBC MORPH BLD: NORMAL
SARS-COV-2 RNA RESP QL NAA+PROBE: NEGATIVE
SODIUM SERPL-SCNC: 143 MMOL/L (ref 136–145)
WBC # BLD AUTO: 11.61 K/UL (ref 4.5–11)

## 2021-08-29 PROCEDURE — 63600175 PHARM REV CODE 636 W HCPCS: Performed by: HOSPITALIST

## 2021-08-29 PROCEDURE — 99223 PR INITIAL HOSPITAL CARE,LEVL III: ICD-10-PCS | Mod: AI,,, | Performed by: INTERNAL MEDICINE

## 2021-08-29 PROCEDURE — 99239 HOSP IP/OBS DSCHRG MGMT >30: CPT | Mod: ,,, | Performed by: HOSPITALIST

## 2021-08-29 PROCEDURE — 99233 SBSQ HOSP IP/OBS HIGH 50: CPT | Mod: ,,, | Performed by: SURGERY

## 2021-08-29 PROCEDURE — 25000003 PHARM REV CODE 250: Performed by: HOSPITALIST

## 2021-08-29 PROCEDURE — 36415 COLL VENOUS BLD VENIPUNCTURE: CPT | Performed by: HOSPITALIST

## 2021-08-29 PROCEDURE — 80053 COMPREHEN METABOLIC PANEL: CPT | Performed by: HOSPITALIST

## 2021-08-29 PROCEDURE — 85025 COMPLETE CBC W/AUTO DIFF WBC: CPT | Performed by: HOSPITALIST

## 2021-08-29 PROCEDURE — 94761 N-INVAS EAR/PLS OXIMETRY MLT: CPT

## 2021-08-29 PROCEDURE — 99223 1ST HOSP IP/OBS HIGH 75: CPT | Mod: AI,,, | Performed by: INTERNAL MEDICINE

## 2021-08-29 PROCEDURE — 11000001 HC ACUTE MED/SURG PRIVATE ROOM

## 2021-08-29 PROCEDURE — 99239 PR HOSPITAL DISCHARGE DAY,>30 MIN: ICD-10-PCS | Mod: ,,, | Performed by: HOSPITALIST

## 2021-08-29 PROCEDURE — 97161 PT EVAL LOW COMPLEX 20 MIN: CPT

## 2021-08-29 PROCEDURE — 87635 SARS-COV-2 COVID-19 AMP PRB: CPT | Performed by: INTERNAL MEDICINE

## 2021-08-29 PROCEDURE — 83735 ASSAY OF MAGNESIUM: CPT | Performed by: HOSPITALIST

## 2021-08-29 PROCEDURE — 99233 PR SUBSEQUENT HOSPITAL CARE,LEVL III: ICD-10-PCS | Mod: ,,, | Performed by: SURGERY

## 2021-08-29 RX ORDER — SODIUM CHLORIDE, SODIUM LACTATE, POTASSIUM CHLORIDE, CALCIUM CHLORIDE 600; 310; 30; 20 MG/100ML; MG/100ML; MG/100ML; MG/100ML
INJECTION, SOLUTION INTRAVENOUS CONTINUOUS
Status: CANCELLED | OUTPATIENT
Start: 2021-08-29

## 2021-08-29 RX ORDER — TRAZODONE HYDROCHLORIDE 50 MG/1
50 TABLET ORAL NIGHTLY PRN
Status: CANCELLED | OUTPATIENT
Start: 2021-08-29

## 2021-08-29 RX ORDER — CETIRIZINE HYDROCHLORIDE 10 MG/1
10 TABLET ORAL DAILY
Status: CANCELLED | OUTPATIENT
Start: 2021-08-30

## 2021-08-29 RX ORDER — SODIUM CHLORIDE 0.9 % (FLUSH) 0.9 %
10 SYRINGE (ML) INJECTION
Status: DISCONTINUED | OUTPATIENT
Start: 2021-08-29 | End: 2021-09-08 | Stop reason: HOSPADM

## 2021-08-29 RX ORDER — ACETAMINOPHEN 500 MG
1000 TABLET ORAL EVERY 6 HOURS PRN
Status: DISCONTINUED | OUTPATIENT
Start: 2021-08-29 | End: 2021-09-08 | Stop reason: HOSPADM

## 2021-08-29 RX ORDER — ENOXAPARIN SODIUM 100 MG/ML
40 INJECTION SUBCUTANEOUS EVERY 24 HOURS
Status: DISCONTINUED | OUTPATIENT
Start: 2021-08-30 | End: 2021-09-08 | Stop reason: HOSPADM

## 2021-08-29 RX ORDER — VERAPAMIL HYDROCHLORIDE 120 MG/1
120 TABLET, FILM COATED, EXTENDED RELEASE ORAL DAILY
Status: CANCELLED | OUTPATIENT
Start: 2021-08-30

## 2021-08-29 RX ORDER — BISACODYL 5 MG
10 TABLET, DELAYED RELEASE (ENTERIC COATED) ORAL DAILY PRN
Status: CANCELLED | OUTPATIENT
Start: 2021-08-29

## 2021-08-29 RX ORDER — ONDANSETRON 2 MG/ML
8 INJECTION INTRAMUSCULAR; INTRAVENOUS EVERY 6 HOURS PRN
Status: CANCELLED | OUTPATIENT
Start: 2021-08-29

## 2021-08-29 RX ORDER — VERAPAMIL HYDROCHLORIDE 120 MG/1
120 TABLET, FILM COATED, EXTENDED RELEASE ORAL DAILY
Status: DISCONTINUED | OUTPATIENT
Start: 2021-08-30 | End: 2021-09-08 | Stop reason: HOSPADM

## 2021-08-29 RX ORDER — CETIRIZINE HYDROCHLORIDE 10 MG/1
10 TABLET ORAL DAILY
Status: DISCONTINUED | OUTPATIENT
Start: 2021-08-30 | End: 2021-09-08 | Stop reason: HOSPADM

## 2021-08-29 RX ORDER — ENOXAPARIN SODIUM 100 MG/ML
40 INJECTION SUBCUTANEOUS EVERY 24 HOURS
Status: DISCONTINUED | OUTPATIENT
Start: 2021-08-29 | End: 2021-08-29

## 2021-08-29 RX ORDER — SIMETHICONE 80 MG
1 TABLET,CHEWABLE ORAL 3 TIMES DAILY PRN
Status: CANCELLED | OUTPATIENT
Start: 2021-08-29

## 2021-08-29 RX ORDER — GUAIFENESIN/DEXTROMETHORPHAN 100-10MG/5
5 SYRUP ORAL EVERY 4 HOURS PRN
Status: DISCONTINUED | OUTPATIENT
Start: 2021-08-29 | End: 2021-09-08 | Stop reason: HOSPADM

## 2021-08-29 RX ORDER — PANTOPRAZOLE SODIUM 40 MG/1
40 TABLET, DELAYED RELEASE ORAL DAILY
Status: CANCELLED | OUTPATIENT
Start: 2021-08-30

## 2021-08-29 RX ORDER — GUAIFENESIN/DEXTROMETHORPHAN 100-10MG/5
10 SYRUP ORAL EVERY 6 HOURS PRN
Status: DISCONTINUED | OUTPATIENT
Start: 2021-08-29 | End: 2021-09-08 | Stop reason: HOSPADM

## 2021-08-29 RX ORDER — OXYBUTYNIN CHLORIDE 5 MG/1
15 TABLET, EXTENDED RELEASE ORAL DAILY
Status: DISCONTINUED | OUTPATIENT
Start: 2021-08-30 | End: 2021-09-08 | Stop reason: HOSPADM

## 2021-08-29 RX ORDER — OXYBUTYNIN CHLORIDE 5 MG/1
15 TABLET, EXTENDED RELEASE ORAL DAILY
Status: CANCELLED | OUTPATIENT
Start: 2021-08-30

## 2021-08-29 RX ORDER — GUAIFENESIN/DEXTROMETHORPHAN 100-10MG/5
5 SYRUP ORAL EVERY 4 HOURS PRN
Status: CANCELLED | OUTPATIENT
Start: 2021-08-29

## 2021-08-29 RX ORDER — GUAIFENESIN/DEXTROMETHORPHAN 100-10MG/5
10 SYRUP ORAL EVERY 6 HOURS PRN
Status: CANCELLED | OUTPATIENT
Start: 2021-08-29

## 2021-08-29 RX ORDER — ONDANSETRON 2 MG/ML
8 INJECTION INTRAMUSCULAR; INTRAVENOUS EVERY 6 HOURS PRN
Status: DISCONTINUED | OUTPATIENT
Start: 2021-08-29 | End: 2021-09-08 | Stop reason: HOSPADM

## 2021-08-29 RX ORDER — PANTOPRAZOLE SODIUM 40 MG/1
40 TABLET, DELAYED RELEASE ORAL DAILY
Status: DISCONTINUED | OUTPATIENT
Start: 2021-08-30 | End: 2021-09-08 | Stop reason: HOSPADM

## 2021-08-29 RX ORDER — SODIUM CHLORIDE, SODIUM LACTATE, POTASSIUM CHLORIDE, CALCIUM CHLORIDE 600; 310; 30; 20 MG/100ML; MG/100ML; MG/100ML; MG/100ML
INJECTION, SOLUTION INTRAVENOUS CONTINUOUS
Status: DISCONTINUED | OUTPATIENT
Start: 2021-08-29 | End: 2021-08-31

## 2021-08-29 RX ORDER — SODIUM CHLORIDE 0.9 % (FLUSH) 0.9 %
10 SYRINGE (ML) INJECTION
Status: CANCELLED | OUTPATIENT
Start: 2021-08-29

## 2021-08-29 RX ORDER — SIMETHICONE 80 MG
1 TABLET,CHEWABLE ORAL 3 TIMES DAILY PRN
Status: DISCONTINUED | OUTPATIENT
Start: 2021-08-29 | End: 2021-09-08 | Stop reason: HOSPADM

## 2021-08-29 RX ORDER — ACETAMINOPHEN 500 MG
1000 TABLET ORAL EVERY 6 HOURS PRN
Status: CANCELLED | OUTPATIENT
Start: 2021-08-29

## 2021-08-29 RX ORDER — TRAZODONE HYDROCHLORIDE 50 MG/1
50 TABLET ORAL NIGHTLY PRN
Status: DISCONTINUED | OUTPATIENT
Start: 2021-08-29 | End: 2021-09-08 | Stop reason: HOSPADM

## 2021-08-29 RX ORDER — BISACODYL 5 MG
10 TABLET, DELAYED RELEASE (ENTERIC COATED) ORAL DAILY PRN
Status: DISCONTINUED | OUTPATIENT
Start: 2021-08-29 | End: 2021-09-08 | Stop reason: HOSPADM

## 2021-08-29 RX ADMIN — PIPERACILLIN AND TAZOBACTAM 4.5 G: 4; .5 INJECTION, POWDER, LYOPHILIZED, FOR SOLUTION INTRAVENOUS; PARENTERAL at 11:08

## 2021-08-29 RX ADMIN — SODIUM CHLORIDE, POTASSIUM CHLORIDE, SODIUM LACTATE AND CALCIUM CHLORIDE: 600; 310; 30; 20 INJECTION, SOLUTION INTRAVENOUS at 08:08

## 2021-08-29 RX ADMIN — ACETAMINOPHEN 1000 MG: 500 TABLET ORAL at 06:08

## 2021-08-29 RX ADMIN — PIPERACILLIN SODIUM AND TAZOBACTAM SODIUM 4.5 G: 4; .5 INJECTION, POWDER, LYOPHILIZED, FOR SOLUTION INTRAVENOUS at 06:08

## 2021-08-29 RX ADMIN — PANTOPRAZOLE SODIUM 40 MG: 40 TABLET, DELAYED RELEASE ORAL at 08:08

## 2021-08-29 RX ADMIN — CETIRIZINE HYDROCHLORIDE 10 MG: 10 TABLET, FILM COATED ORAL at 08:08

## 2021-08-29 RX ADMIN — SODIUM CHLORIDE, POTASSIUM CHLORIDE, SODIUM LACTATE AND CALCIUM CHLORIDE: 600; 310; 30; 20 INJECTION, SOLUTION INTRAVENOUS at 06:08

## 2021-08-29 RX ADMIN — PIPERACILLIN AND TAZOBACTAM 4.5 G: 4; .5 INJECTION, POWDER, LYOPHILIZED, FOR SOLUTION INTRAVENOUS; PARENTERAL at 02:08

## 2021-08-29 RX ADMIN — VERAPAMIL HYDROCHLORIDE 120 MG: 120 TABLET, FILM COATED, EXTENDED RELEASE ORAL at 08:08

## 2021-08-29 RX ADMIN — OXYBUTYNIN CHLORIDE 15 MG: 5 TABLET, EXTENDED RELEASE ORAL at 08:08

## 2021-08-30 ENCOUNTER — APPOINTMENT (OUTPATIENT)
Dept: RADIOLOGY | Facility: HOSPITAL | Age: 86
End: 2021-08-30
Payer: MEDICARE

## 2021-08-30 LAB
MAGNESIUM SERPL-MCNC: 2 MG/DL (ref 1.7–2.3)
PROCALCITONIN SERPL-MCNC: 56 NG/ML

## 2021-08-30 PROCEDURE — 63600175 PHARM REV CODE 636 W HCPCS: Performed by: HOSPITALIST

## 2021-08-30 PROCEDURE — 99232 SBSQ HOSP IP/OBS MODERATE 35: CPT | Mod: ,,, | Performed by: FAMILY MEDICINE

## 2021-08-30 PROCEDURE — 99232 PR SUBSEQUENT HOSPITAL CARE,LEVL II: ICD-10-PCS | Mod: ,,, | Performed by: FAMILY MEDICINE

## 2021-08-30 PROCEDURE — 36415 COLL VENOUS BLD VENIPUNCTURE: CPT | Performed by: INTERNAL MEDICINE

## 2021-08-30 PROCEDURE — 25000003 PHARM REV CODE 250: Performed by: HOSPITALIST

## 2021-08-30 PROCEDURE — 94761 N-INVAS EAR/PLS OXIMETRY MLT: CPT

## 2021-08-30 PROCEDURE — 97165 OT EVAL LOW COMPLEX 30 MIN: CPT

## 2021-08-30 PROCEDURE — 83735 ASSAY OF MAGNESIUM: CPT | Performed by: INTERNAL MEDICINE

## 2021-08-30 PROCEDURE — 63600175 PHARM REV CODE 636 W HCPCS: Performed by: INTERNAL MEDICINE

## 2021-08-30 PROCEDURE — 93971 EXTREMITY STUDY: CPT | Mod: 26,LT,, | Performed by: RADIOLOGY

## 2021-08-30 PROCEDURE — 93971 US LOWER EXTREMITY VEINS LEFT: ICD-10-PCS | Mod: 26,LT,, | Performed by: RADIOLOGY

## 2021-08-30 PROCEDURE — 97161 PT EVAL LOW COMPLEX 20 MIN: CPT

## 2021-08-30 PROCEDURE — 11000001 HC ACUTE MED/SURG PRIVATE ROOM

## 2021-08-30 PROCEDURE — 93971 EXTREMITY STUDY: CPT | Mod: TC,LT

## 2021-08-30 RX ADMIN — OXYBUTYNIN CHLORIDE 15 MG: 5 TABLET, EXTENDED RELEASE ORAL at 10:08

## 2021-08-30 RX ADMIN — ENOXAPARIN SODIUM 40 MG: 40 INJECTION SUBCUTANEOUS at 10:08

## 2021-08-30 RX ADMIN — CETIRIZINE HYDROCHLORIDE 10 MG: 10 TABLET, FILM COATED ORAL at 10:08

## 2021-08-30 RX ADMIN — PANTOPRAZOLE SODIUM 40 MG: 40 TABLET, DELAYED RELEASE ORAL at 10:08

## 2021-08-30 RX ADMIN — ACETAMINOPHEN 1000 MG: 500 TABLET ORAL at 10:08

## 2021-08-30 RX ADMIN — VERAPAMIL HYDROCHLORIDE 120 MG: 120 TABLET, FILM COATED, EXTENDED RELEASE ORAL at 10:08

## 2021-08-30 RX ADMIN — SODIUM CHLORIDE, POTASSIUM CHLORIDE, SODIUM LACTATE AND CALCIUM CHLORIDE: 600; 310; 30; 20 INJECTION, SOLUTION INTRAVENOUS at 11:08

## 2021-08-30 RX ADMIN — PIPERACILLIN SODIUM AND TAZOBACTAM SODIUM 4.5 G: 4; .5 INJECTION, POWDER, LYOPHILIZED, FOR SOLUTION INTRAVENOUS at 06:08

## 2021-08-30 RX ADMIN — PIPERACILLIN SODIUM AND TAZOBACTAM SODIUM 4.5 G: 4; .5 INJECTION, POWDER, LYOPHILIZED, FOR SOLUTION INTRAVENOUS at 02:08

## 2021-08-30 RX ADMIN — PIPERACILLIN SODIUM AND TAZOBACTAM SODIUM 4.5 G: 4; .5 INJECTION, POWDER, LYOPHILIZED, FOR SOLUTION INTRAVENOUS at 10:08

## 2021-08-31 ENCOUNTER — APPOINTMENT (OUTPATIENT)
Dept: RADIOLOGY | Facility: HOSPITAL | Age: 86
End: 2021-08-31
Attending: UROLOGY
Payer: MEDICARE

## 2021-08-31 LAB
ANION GAP SERPL CALCULATED.3IONS-SCNC: 14 MMOL/L (ref 7–16)
BACTERIA BLD CULT: NORMAL
BACTERIA BLD CULT: NORMAL
BASOPHILS # BLD AUTO: 0.02 K/UL (ref 0–0.2)
BASOPHILS NFR BLD AUTO: 0.2 % (ref 0–1)
BUN SERPL-MCNC: 12 MG/DL (ref 7–18)
BUN/CREAT SERPL: 10 (ref 6–20)
CALCIUM SERPL-MCNC: 8.9 MG/DL (ref 8.5–10.1)
CHLORIDE SERPL-SCNC: 108 MMOL/L (ref 98–107)
CO2 SERPL-SCNC: 23 MMOL/L (ref 21–32)
CREAT SERPL-MCNC: 1.26 MG/DL (ref 0.55–1.02)
DIFFERENTIAL METHOD BLD: ABNORMAL
EOSINOPHIL # BLD AUTO: 0.3 K/UL (ref 0–0.5)
EOSINOPHIL NFR BLD AUTO: 3.4 % (ref 1–4)
ERYTHROCYTE [DISTWIDTH] IN BLOOD BY AUTOMATED COUNT: 13.8 % (ref 11.5–14.5)
GLUCOSE SERPL-MCNC: 111 MG/DL (ref 74–106)
HCT VFR BLD AUTO: 36.4 % (ref 38–47)
HGB BLD-MCNC: 11.9 G/DL (ref 12–16)
IMM GRANULOCYTES # BLD AUTO: 0.34 K/UL (ref 0–0.04)
IMM GRANULOCYTES NFR BLD: 3.8 % (ref 0–0.4)
LYMPHOCYTES # BLD AUTO: 1.02 K/UL (ref 1–4.8)
LYMPHOCYTES NFR BLD AUTO: 11.5 % (ref 27–41)
MCH RBC QN AUTO: 28.7 PG (ref 27–31)
MCHC RBC AUTO-ENTMCNC: 32.7 G/DL (ref 32–36)
MCV RBC AUTO: 87.9 FL (ref 80–96)
MONOCYTES # BLD AUTO: 0.62 K/UL (ref 0–0.8)
MONOCYTES NFR BLD AUTO: 7 % (ref 2–6)
MPC BLD CALC-MCNC: 9.8 FL (ref 9.4–12.4)
NEUTROPHILS # BLD AUTO: 6.6 K/UL (ref 1.8–7.7)
NEUTROPHILS NFR BLD AUTO: 74.1 % (ref 53–65)
NRBC # BLD AUTO: 0 X10E3/UL
NRBC, AUTO (.00): 0 %
PLATELET # BLD AUTO: 126 K/UL (ref 150–400)
POTASSIUM SERPL-SCNC: 3.2 MMOL/L (ref 3.5–5.1)
RBC # BLD AUTO: 4.14 M/UL (ref 4.2–5.4)
SODIUM SERPL-SCNC: 142 MMOL/L (ref 136–145)
WBC # BLD AUTO: 8.9 K/UL (ref 4.5–11)

## 2021-08-31 PROCEDURE — 97535 SELF CARE MNGMENT TRAINING: CPT | Mod: CO

## 2021-08-31 PROCEDURE — 99232 SBSQ HOSP IP/OBS MODERATE 35: CPT | Mod: ,,, | Performed by: FAMILY MEDICINE

## 2021-08-31 PROCEDURE — 97110 THERAPEUTIC EXERCISES: CPT | Mod: CQ

## 2021-08-31 PROCEDURE — 63600175 PHARM REV CODE 636 W HCPCS: Performed by: INTERNAL MEDICINE

## 2021-08-31 PROCEDURE — 25000003 PHARM REV CODE 250: Performed by: HOSPITALIST

## 2021-08-31 PROCEDURE — 74018 RADEX ABDOMEN 1 VIEW: CPT | Mod: 26,,, | Performed by: RADIOLOGY

## 2021-08-31 PROCEDURE — 11000001 HC ACUTE MED/SURG PRIVATE ROOM

## 2021-08-31 PROCEDURE — 36415 COLL VENOUS BLD VENIPUNCTURE: CPT | Performed by: INTERNAL MEDICINE

## 2021-08-31 PROCEDURE — 97116 GAIT TRAINING THERAPY: CPT | Mod: CQ

## 2021-08-31 PROCEDURE — 99232 PR SUBSEQUENT HOSPITAL CARE,LEVL II: ICD-10-PCS | Mod: ,,, | Performed by: FAMILY MEDICINE

## 2021-08-31 PROCEDURE — 80048 BASIC METABOLIC PNL TOTAL CA: CPT | Performed by: INTERNAL MEDICINE

## 2021-08-31 PROCEDURE — 94761 N-INVAS EAR/PLS OXIMETRY MLT: CPT

## 2021-08-31 PROCEDURE — 74018 RADEX ABDOMEN 1 VIEW: CPT | Mod: TC

## 2021-08-31 PROCEDURE — 25000003 PHARM REV CODE 250: Performed by: NURSE PRACTITIONER

## 2021-08-31 PROCEDURE — 85025 COMPLETE CBC W/AUTO DIFF WBC: CPT | Performed by: INTERNAL MEDICINE

## 2021-08-31 PROCEDURE — 74018 XR KUB: ICD-10-PCS | Mod: 26,,, | Performed by: RADIOLOGY

## 2021-08-31 PROCEDURE — 63600175 PHARM REV CODE 636 W HCPCS: Performed by: HOSPITALIST

## 2021-08-31 PROCEDURE — 87635 SARS-COV-2 COVID-19 AMP PRB: CPT | Performed by: NURSE PRACTITIONER

## 2021-08-31 RX ORDER — MICONAZOLE NITRATE 2 %
POWDER (GRAM) TOPICAL 2 TIMES DAILY
Status: DISCONTINUED | OUTPATIENT
Start: 2021-08-31 | End: 2021-09-08 | Stop reason: HOSPADM

## 2021-08-31 RX ADMIN — CETIRIZINE HYDROCHLORIDE 10 MG: 10 TABLET, FILM COATED ORAL at 08:08

## 2021-08-31 RX ADMIN — PANTOPRAZOLE SODIUM 40 MG: 40 TABLET, DELAYED RELEASE ORAL at 08:08

## 2021-08-31 RX ADMIN — PIPERACILLIN SODIUM AND TAZOBACTAM SODIUM 4.5 G: 4; .5 INJECTION, POWDER, LYOPHILIZED, FOR SOLUTION INTRAVENOUS at 02:08

## 2021-08-31 RX ADMIN — GUAIFENESIN AND DEXTROMETHORPHAN 10 ML: 100; 10 SYRUP ORAL at 03:08

## 2021-08-31 RX ADMIN — VERAPAMIL HYDROCHLORIDE 120 MG: 120 TABLET, FILM COATED, EXTENDED RELEASE ORAL at 08:08

## 2021-08-31 RX ADMIN — PIPERACILLIN SODIUM AND TAZOBACTAM SODIUM 4.5 G: 4; .5 INJECTION, POWDER, LYOPHILIZED, FOR SOLUTION INTRAVENOUS at 05:08

## 2021-08-31 RX ADMIN — GUAIFENESIN AND DEXTROMETHORPHAN 10 ML: 100; 10 SYRUP ORAL at 08:08

## 2021-08-31 RX ADMIN — ENOXAPARIN SODIUM 40 MG: 40 INJECTION SUBCUTANEOUS at 05:08

## 2021-08-31 RX ADMIN — OXYBUTYNIN CHLORIDE 15 MG: 5 TABLET, EXTENDED RELEASE ORAL at 08:08

## 2021-08-31 RX ADMIN — PIPERACILLIN SODIUM AND TAZOBACTAM SODIUM 4.5 G: 4; .5 INJECTION, POWDER, LYOPHILIZED, FOR SOLUTION INTRAVENOUS at 09:08

## 2021-08-31 RX ADMIN — TRAZODONE HYDROCHLORIDE 50 MG: 50 TABLET ORAL at 08:08

## 2021-08-31 RX ADMIN — MICONAZOLE NITRATE 2 % TOPICAL POWDER: at 08:08

## 2021-09-01 ENCOUNTER — APPOINTMENT (OUTPATIENT)
Dept: RADIOLOGY | Facility: HOSPITAL | Age: 86
End: 2021-09-01
Attending: HOSPITALIST
Payer: MEDICARE

## 2021-09-01 LAB
ANION GAP SERPL CALCULATED.3IONS-SCNC: 12 MMOL/L (ref 7–16)
BASOPHILS # BLD AUTO: 0.02 K/UL (ref 0–0.2)
BASOPHILS NFR BLD AUTO: 0.3 % (ref 0–1)
BUN SERPL-MCNC: 11 MG/DL (ref 7–18)
BUN/CREAT SERPL: 9 (ref 6–20)
CALCIUM SERPL-MCNC: 8.5 MG/DL (ref 8.5–10.1)
CHLORIDE SERPL-SCNC: 110 MMOL/L (ref 98–107)
CO2 SERPL-SCNC: 26 MMOL/L (ref 21–32)
CREAT SERPL-MCNC: 1.22 MG/DL (ref 0.55–1.02)
DIFFERENTIAL METHOD BLD: ABNORMAL
EOSINOPHIL # BLD AUTO: 0.3 K/UL (ref 0–0.5)
EOSINOPHIL NFR BLD AUTO: 4.3 % (ref 1–4)
ERYTHROCYTE [DISTWIDTH] IN BLOOD BY AUTOMATED COUNT: 13.7 % (ref 11.5–14.5)
GLUCOSE SERPL-MCNC: 104 MG/DL (ref 74–106)
HCT VFR BLD AUTO: 34.6 % (ref 38–47)
HGB BLD-MCNC: 11.3 G/DL (ref 12–16)
IMM GRANULOCYTES # BLD AUTO: 0.25 K/UL (ref 0–0.04)
IMM GRANULOCYTES NFR BLD: 3.6 % (ref 0–0.4)
LYMPHOCYTES # BLD AUTO: 1.02 K/UL (ref 1–4.8)
LYMPHOCYTES NFR BLD AUTO: 14.6 % (ref 27–41)
MAGNESIUM SERPL-MCNC: 1.9 MG/DL (ref 1.7–2.3)
MCH RBC QN AUTO: 28.8 PG (ref 27–31)
MCHC RBC AUTO-ENTMCNC: 32.7 G/DL (ref 32–36)
MCV RBC AUTO: 88.3 FL (ref 80–96)
MONOCYTES # BLD AUTO: 0.53 K/UL (ref 0–0.8)
MONOCYTES NFR BLD AUTO: 7.6 % (ref 2–6)
MPC BLD CALC-MCNC: 10.5 FL (ref 9.4–12.4)
NEUTROPHILS # BLD AUTO: 4.89 K/UL (ref 1.8–7.7)
NEUTROPHILS NFR BLD AUTO: 69.6 % (ref 53–65)
NRBC # BLD AUTO: 0 X10E3/UL
NRBC, AUTO (.00): 0 %
PLATELET # BLD AUTO: 144 K/UL (ref 150–400)
POTASSIUM SERPL-SCNC: 3.1 MMOL/L (ref 3.5–5.1)
RBC # BLD AUTO: 3.92 M/UL (ref 4.2–5.4)
SARS-COV-2 RNA RESP QL NAA+PROBE: NEGATIVE
SODIUM SERPL-SCNC: 145 MMOL/L (ref 136–145)
WBC # BLD AUTO: 7.01 K/UL (ref 4.5–11)

## 2021-09-01 PROCEDURE — 11000001 HC ACUTE MED/SURG PRIVATE ROOM

## 2021-09-01 PROCEDURE — 78227 HEPATOBIL SYST IMAGE W/DRUG: CPT | Mod: TC

## 2021-09-01 PROCEDURE — 99232 SBSQ HOSP IP/OBS MODERATE 35: CPT | Mod: ,,, | Performed by: FAMILY MEDICINE

## 2021-09-01 PROCEDURE — 25000003 PHARM REV CODE 250: Performed by: HOSPITALIST

## 2021-09-01 PROCEDURE — 97530 THERAPEUTIC ACTIVITIES: CPT

## 2021-09-01 PROCEDURE — 80048 BASIC METABOLIC PNL TOTAL CA: CPT | Performed by: INTERNAL MEDICINE

## 2021-09-01 PROCEDURE — 97110 THERAPEUTIC EXERCISES: CPT

## 2021-09-01 PROCEDURE — 83735 ASSAY OF MAGNESIUM: CPT | Performed by: INTERNAL MEDICINE

## 2021-09-01 PROCEDURE — 85025 COMPLETE CBC W/AUTO DIFF WBC: CPT | Performed by: INTERNAL MEDICINE

## 2021-09-01 PROCEDURE — 25000003 PHARM REV CODE 250: Performed by: NURSE PRACTITIONER

## 2021-09-01 PROCEDURE — 63600175 PHARM REV CODE 636 W HCPCS: Performed by: INTERNAL MEDICINE

## 2021-09-01 PROCEDURE — 78227 HEPATOBIL SYST IMAGE W/DRUG: CPT | Mod: 26,,, | Performed by: RADIOLOGY

## 2021-09-01 PROCEDURE — 63600175 PHARM REV CODE 636 W HCPCS: Performed by: HOSPITALIST

## 2021-09-01 PROCEDURE — 78227 NM HEPATOBILIARY(HIDA) WITH PHARM AND EF: ICD-10-PCS | Mod: 26,,, | Performed by: RADIOLOGY

## 2021-09-01 PROCEDURE — 94761 N-INVAS EAR/PLS OXIMETRY MLT: CPT

## 2021-09-01 PROCEDURE — 99232 PR SUBSEQUENT HOSPITAL CARE,LEVL II: ICD-10-PCS | Mod: ,,, | Performed by: FAMILY MEDICINE

## 2021-09-01 PROCEDURE — 36415 COLL VENOUS BLD VENIPUNCTURE: CPT | Performed by: INTERNAL MEDICINE

## 2021-09-01 PROCEDURE — 96372 THER/PROPH/DIAG INJ SC/IM: CPT

## 2021-09-01 PROCEDURE — 97116 GAIT TRAINING THERAPY: CPT

## 2021-09-01 RX ORDER — POTASSIUM CHLORIDE 20 MEQ/1
20 TABLET, EXTENDED RELEASE ORAL 2 TIMES DAILY
Status: COMPLETED | OUTPATIENT
Start: 2021-09-01 | End: 2021-09-01

## 2021-09-01 RX ADMIN — TRAZODONE HYDROCHLORIDE 50 MG: 50 TABLET ORAL at 11:09

## 2021-09-01 RX ADMIN — MICONAZOLE NITRATE 2 % TOPICAL POWDER: at 09:09

## 2021-09-01 RX ADMIN — PIPERACILLIN SODIUM AND TAZOBACTAM SODIUM 4.5 G: 4; .5 INJECTION, POWDER, LYOPHILIZED, FOR SOLUTION INTRAVENOUS at 02:09

## 2021-09-01 RX ADMIN — GUAIFENESIN AND DEXTROMETHORPHAN 5 ML: 100; 10 SYRUP ORAL at 04:09

## 2021-09-01 RX ADMIN — VERAPAMIL HYDROCHLORIDE 120 MG: 120 TABLET, FILM COATED, EXTENDED RELEASE ORAL at 09:09

## 2021-09-01 RX ADMIN — POTASSIUM CHLORIDE 20 MEQ: 20 TABLET, EXTENDED RELEASE ORAL at 11:09

## 2021-09-01 RX ADMIN — OXYBUTYNIN CHLORIDE 15 MG: 5 TABLET, EXTENDED RELEASE ORAL at 09:09

## 2021-09-01 RX ADMIN — PIPERACILLIN SODIUM AND TAZOBACTAM SODIUM 4.5 G: 4; .5 INJECTION, POWDER, LYOPHILIZED, FOR SOLUTION INTRAVENOUS at 06:09

## 2021-09-01 RX ADMIN — PANTOPRAZOLE SODIUM 40 MG: 40 TABLET, DELAYED RELEASE ORAL at 09:09

## 2021-09-01 RX ADMIN — CETIRIZINE HYDROCHLORIDE 10 MG: 10 TABLET, FILM COATED ORAL at 09:09

## 2021-09-01 RX ADMIN — PIPERACILLIN SODIUM AND TAZOBACTAM SODIUM 4.5 G: 4; .5 INJECTION, POWDER, LYOPHILIZED, FOR SOLUTION INTRAVENOUS at 11:09

## 2021-09-01 RX ADMIN — ENOXAPARIN SODIUM 40 MG: 40 INJECTION SUBCUTANEOUS at 05:09

## 2021-09-02 PROCEDURE — 99232 SBSQ HOSP IP/OBS MODERATE 35: CPT | Mod: ,,, | Performed by: FAMILY MEDICINE

## 2021-09-02 PROCEDURE — 97535 SELF CARE MNGMENT TRAINING: CPT | Mod: CO

## 2021-09-02 PROCEDURE — 25000003 PHARM REV CODE 250: Performed by: HOSPITALIST

## 2021-09-02 PROCEDURE — 97110 THERAPEUTIC EXERCISES: CPT | Mod: CO

## 2021-09-02 PROCEDURE — 94761 N-INVAS EAR/PLS OXIMETRY MLT: CPT

## 2021-09-02 PROCEDURE — 63600175 PHARM REV CODE 636 W HCPCS: Performed by: INTERNAL MEDICINE

## 2021-09-02 PROCEDURE — 97110 THERAPEUTIC EXERCISES: CPT | Mod: CQ

## 2021-09-02 PROCEDURE — 63600175 PHARM REV CODE 636 W HCPCS: Performed by: HOSPITALIST

## 2021-09-02 PROCEDURE — 97116 GAIT TRAINING THERAPY: CPT | Mod: CQ

## 2021-09-02 PROCEDURE — 96372 THER/PROPH/DIAG INJ SC/IM: CPT

## 2021-09-02 PROCEDURE — 11000001 HC ACUTE MED/SURG PRIVATE ROOM

## 2021-09-02 PROCEDURE — 99232 PR SUBSEQUENT HOSPITAL CARE,LEVL II: ICD-10-PCS | Mod: ,,, | Performed by: FAMILY MEDICINE

## 2021-09-02 RX ADMIN — VERAPAMIL HYDROCHLORIDE 120 MG: 120 TABLET, FILM COATED, EXTENDED RELEASE ORAL at 09:09

## 2021-09-02 RX ADMIN — MICONAZOLE NITRATE 2 % TOPICAL POWDER: at 08:09

## 2021-09-02 RX ADMIN — PANTOPRAZOLE SODIUM 40 MG: 40 TABLET, DELAYED RELEASE ORAL at 09:09

## 2021-09-02 RX ADMIN — CETIRIZINE HYDROCHLORIDE 10 MG: 10 TABLET, FILM COATED ORAL at 09:09

## 2021-09-02 RX ADMIN — ENOXAPARIN SODIUM 40 MG: 40 INJECTION SUBCUTANEOUS at 06:09

## 2021-09-02 RX ADMIN — PIPERACILLIN SODIUM AND TAZOBACTAM SODIUM 4.5 G: 4; .5 INJECTION, POWDER, LYOPHILIZED, FOR SOLUTION INTRAVENOUS at 06:09

## 2021-09-02 RX ADMIN — MICONAZOLE NITRATE 2 % TOPICAL POWDER: at 09:09

## 2021-09-02 RX ADMIN — PIPERACILLIN SODIUM AND TAZOBACTAM SODIUM 4.5 G: 4; .5 INJECTION, POWDER, LYOPHILIZED, FOR SOLUTION INTRAVENOUS at 02:09

## 2021-09-02 RX ADMIN — TRAZODONE HYDROCHLORIDE 50 MG: 50 TABLET ORAL at 08:09

## 2021-09-02 RX ADMIN — OXYBUTYNIN CHLORIDE 15 MG: 5 TABLET, EXTENDED RELEASE ORAL at 09:09

## 2021-09-02 RX ADMIN — PIPERACILLIN SODIUM AND TAZOBACTAM SODIUM 4.5 G: 4; .5 INJECTION, POWDER, LYOPHILIZED, FOR SOLUTION INTRAVENOUS at 10:09

## 2021-09-03 PROBLEM — N20.0 NEPHROLITHIASIS: Status: ACTIVE | Noted: 2021-09-03

## 2021-09-03 PROBLEM — N30.00 ACUTE CYSTITIS: Status: ACTIVE | Noted: 2021-09-03

## 2021-09-03 PROCEDURE — 25000003 PHARM REV CODE 250: Performed by: HOSPITALIST

## 2021-09-03 PROCEDURE — 63600175 PHARM REV CODE 636 W HCPCS: Performed by: HOSPITALIST

## 2021-09-03 PROCEDURE — 11000001 HC ACUTE MED/SURG PRIVATE ROOM

## 2021-09-03 PROCEDURE — 99232 SBSQ HOSP IP/OBS MODERATE 35: CPT | Mod: ,,, | Performed by: FAMILY MEDICINE

## 2021-09-03 PROCEDURE — 63600175 PHARM REV CODE 636 W HCPCS: Performed by: INTERNAL MEDICINE

## 2021-09-03 PROCEDURE — 97535 SELF CARE MNGMENT TRAINING: CPT | Mod: CO

## 2021-09-03 PROCEDURE — 97116 GAIT TRAINING THERAPY: CPT

## 2021-09-03 PROCEDURE — 25000003 PHARM REV CODE 250: Performed by: NURSE PRACTITIONER

## 2021-09-03 PROCEDURE — 99232 PR SUBSEQUENT HOSPITAL CARE,LEVL II: ICD-10-PCS | Mod: ,,, | Performed by: FAMILY MEDICINE

## 2021-09-03 PROCEDURE — 97110 THERAPEUTIC EXERCISES: CPT

## 2021-09-03 RX ORDER — FUROSEMIDE 20 MG/1
20 TABLET ORAL EVERY OTHER DAY
COMMUNITY

## 2021-09-03 RX ORDER — METHENAMINE HIPPURATE 1000 MG/1
1 TABLET ORAL 2 TIMES DAILY
COMMUNITY
End: 2022-09-26

## 2021-09-03 RX ADMIN — ENOXAPARIN SODIUM 40 MG: 40 INJECTION SUBCUTANEOUS at 05:09

## 2021-09-03 RX ADMIN — MICONAZOLE NITRATE 2 % TOPICAL POWDER: at 08:09

## 2021-09-03 RX ADMIN — VERAPAMIL HYDROCHLORIDE 120 MG: 120 TABLET, FILM COATED, EXTENDED RELEASE ORAL at 08:09

## 2021-09-03 RX ADMIN — PIPERACILLIN SODIUM AND TAZOBACTAM SODIUM 4.5 G: 4; .5 INJECTION, POWDER, LYOPHILIZED, FOR SOLUTION INTRAVENOUS at 09:09

## 2021-09-03 RX ADMIN — PIPERACILLIN SODIUM AND TAZOBACTAM SODIUM 4.5 G: 4; .5 INJECTION, POWDER, LYOPHILIZED, FOR SOLUTION INTRAVENOUS at 02:09

## 2021-09-03 RX ADMIN — PANTOPRAZOLE SODIUM 40 MG: 40 TABLET, DELAYED RELEASE ORAL at 08:09

## 2021-09-03 RX ADMIN — OXYBUTYNIN CHLORIDE 15 MG: 5 TABLET, EXTENDED RELEASE ORAL at 08:09

## 2021-09-03 RX ADMIN — PIPERACILLIN SODIUM AND TAZOBACTAM SODIUM 4.5 G: 4; .5 INJECTION, POWDER, LYOPHILIZED, FOR SOLUTION INTRAVENOUS at 05:09

## 2021-09-03 RX ADMIN — CETIRIZINE HYDROCHLORIDE 10 MG: 10 TABLET, FILM COATED ORAL at 08:09

## 2021-09-04 PROCEDURE — 63600175 PHARM REV CODE 636 W HCPCS: Performed by: INTERNAL MEDICINE

## 2021-09-04 PROCEDURE — 25000003 PHARM REV CODE 250: Performed by: NURSE PRACTITIONER

## 2021-09-04 PROCEDURE — 25000003 PHARM REV CODE 250: Performed by: HOSPITALIST

## 2021-09-04 PROCEDURE — 63600175 PHARM REV CODE 636 W HCPCS: Performed by: HOSPITALIST

## 2021-09-04 PROCEDURE — 11000001 HC ACUTE MED/SURG PRIVATE ROOM

## 2021-09-04 PROCEDURE — 99232 SBSQ HOSP IP/OBS MODERATE 35: CPT | Mod: ,,, | Performed by: FAMILY MEDICINE

## 2021-09-04 PROCEDURE — 94761 N-INVAS EAR/PLS OXIMETRY MLT: CPT

## 2021-09-04 PROCEDURE — 99232 PR SUBSEQUENT HOSPITAL CARE,LEVL II: ICD-10-PCS | Mod: ,,, | Performed by: FAMILY MEDICINE

## 2021-09-04 RX ADMIN — PANTOPRAZOLE SODIUM 40 MG: 40 TABLET, DELAYED RELEASE ORAL at 08:09

## 2021-09-04 RX ADMIN — PIPERACILLIN SODIUM AND TAZOBACTAM SODIUM 4.5 G: 4; .5 INJECTION, POWDER, LYOPHILIZED, FOR SOLUTION INTRAVENOUS at 05:09

## 2021-09-04 RX ADMIN — VERAPAMIL HYDROCHLORIDE 120 MG: 120 TABLET, FILM COATED, EXTENDED RELEASE ORAL at 08:09

## 2021-09-04 RX ADMIN — ENOXAPARIN SODIUM 40 MG: 40 INJECTION SUBCUTANEOUS at 05:09

## 2021-09-04 RX ADMIN — PIPERACILLIN SODIUM AND TAZOBACTAM SODIUM 4.5 G: 4; .5 INJECTION, POWDER, LYOPHILIZED, FOR SOLUTION INTRAVENOUS at 02:09

## 2021-09-04 RX ADMIN — PIPERACILLIN SODIUM AND TAZOBACTAM SODIUM 4.5 G: 4; .5 INJECTION, POWDER, LYOPHILIZED, FOR SOLUTION INTRAVENOUS at 10:09

## 2021-09-04 RX ADMIN — OXYBUTYNIN CHLORIDE 15 MG: 5 TABLET, EXTENDED RELEASE ORAL at 08:09

## 2021-09-04 RX ADMIN — CETIRIZINE HYDROCHLORIDE 10 MG: 10 TABLET, FILM COATED ORAL at 08:09

## 2021-09-04 RX ADMIN — MICONAZOLE NITRATE 2 % TOPICAL POWDER: at 08:09

## 2021-09-05 PROBLEM — E87.6 HYPOKALEMIA: Status: ACTIVE | Noted: 2021-09-05

## 2021-09-05 LAB
ANION GAP SERPL CALCULATED.3IONS-SCNC: 10 MMOL/L (ref 7–16)
BASOPHILS # BLD AUTO: 0.03 K/UL (ref 0–0.2)
BASOPHILS NFR BLD AUTO: 0.5 % (ref 0–1)
BUN SERPL-MCNC: 9 MG/DL (ref 7–18)
BUN/CREAT SERPL: 8 (ref 6–20)
CALCIUM SERPL-MCNC: 9.1 MG/DL (ref 8.5–10.1)
CHLORIDE SERPL-SCNC: 107 MMOL/L (ref 98–107)
CO2 SERPL-SCNC: 28 MMOL/L (ref 21–32)
CREAT SERPL-MCNC: 1.15 MG/DL (ref 0.55–1.02)
DIFFERENTIAL METHOD BLD: ABNORMAL
EOSINOPHIL # BLD AUTO: 0.13 K/UL (ref 0–0.5)
EOSINOPHIL NFR BLD AUTO: 2.1 % (ref 1–4)
ERYTHROCYTE [DISTWIDTH] IN BLOOD BY AUTOMATED COUNT: 13.5 % (ref 11.5–14.5)
GLUCOSE SERPL-MCNC: 97 MG/DL (ref 74–106)
HCT VFR BLD AUTO: 35.6 % (ref 38–47)
HGB BLD-MCNC: 11.9 G/DL (ref 12–16)
IMM GRANULOCYTES # BLD AUTO: 0.03 K/UL (ref 0–0.04)
IMM GRANULOCYTES NFR BLD: 0.5 % (ref 0–0.4)
LYMPHOCYTES # BLD AUTO: 1.39 K/UL (ref 1–4.8)
LYMPHOCYTES NFR BLD AUTO: 22.9 % (ref 27–41)
MAGNESIUM SERPL-MCNC: 2.1 MG/DL (ref 1.7–2.3)
MCH RBC QN AUTO: 29 PG (ref 27–31)
MCHC RBC AUTO-ENTMCNC: 33.4 G/DL (ref 32–36)
MCV RBC AUTO: 86.8 FL (ref 80–96)
MONOCYTES # BLD AUTO: 0.59 K/UL (ref 0–0.8)
MONOCYTES NFR BLD AUTO: 9.7 % (ref 2–6)
MPC BLD CALC-MCNC: 9.2 FL (ref 9.4–12.4)
NEUTROPHILS # BLD AUTO: 3.91 K/UL (ref 1.8–7.7)
NEUTROPHILS NFR BLD AUTO: 64.3 % (ref 53–65)
NRBC # BLD AUTO: 0 X10E3/UL
NRBC, AUTO (.00): 0 %
PLATELET # BLD AUTO: 298 K/UL (ref 150–400)
POTASSIUM SERPL-SCNC: 3.3 MMOL/L (ref 3.5–5.1)
RBC # BLD AUTO: 4.1 M/UL (ref 4.2–5.4)
SODIUM SERPL-SCNC: 142 MMOL/L (ref 136–145)
WBC # BLD AUTO: 6.08 K/UL (ref 4.5–11)

## 2021-09-05 PROCEDURE — 83735 ASSAY OF MAGNESIUM: CPT | Performed by: INTERNAL MEDICINE

## 2021-09-05 PROCEDURE — 25000003 PHARM REV CODE 250: Performed by: FAMILY MEDICINE

## 2021-09-05 PROCEDURE — 97116 GAIT TRAINING THERAPY: CPT

## 2021-09-05 PROCEDURE — 11000001 HC ACUTE MED/SURG PRIVATE ROOM

## 2021-09-05 PROCEDURE — 36415 COLL VENOUS BLD VENIPUNCTURE: CPT | Performed by: INTERNAL MEDICINE

## 2021-09-05 PROCEDURE — 85025 COMPLETE CBC W/AUTO DIFF WBC: CPT | Performed by: INTERNAL MEDICINE

## 2021-09-05 PROCEDURE — 80048 BASIC METABOLIC PNL TOTAL CA: CPT | Performed by: INTERNAL MEDICINE

## 2021-09-05 PROCEDURE — 63600175 PHARM REV CODE 636 W HCPCS: Performed by: HOSPITALIST

## 2021-09-05 PROCEDURE — 63600175 PHARM REV CODE 636 W HCPCS: Performed by: INTERNAL MEDICINE

## 2021-09-05 PROCEDURE — 25000003 PHARM REV CODE 250: Performed by: HOSPITALIST

## 2021-09-05 PROCEDURE — 97110 THERAPEUTIC EXERCISES: CPT

## 2021-09-05 RX ORDER — POTASSIUM CHLORIDE 750 MG/1
10 TABLET, EXTENDED RELEASE ORAL DAILY
Status: DISCONTINUED | OUTPATIENT
Start: 2021-09-05 | End: 2021-09-08 | Stop reason: HOSPADM

## 2021-09-05 RX ADMIN — VERAPAMIL HYDROCHLORIDE 120 MG: 120 TABLET, FILM COATED, EXTENDED RELEASE ORAL at 08:09

## 2021-09-05 RX ADMIN — OXYBUTYNIN CHLORIDE 15 MG: 5 TABLET, EXTENDED RELEASE ORAL at 08:09

## 2021-09-05 RX ADMIN — ENOXAPARIN SODIUM 40 MG: 40 INJECTION SUBCUTANEOUS at 04:09

## 2021-09-05 RX ADMIN — PIPERACILLIN SODIUM AND TAZOBACTAM SODIUM 4.5 G: 4; .5 INJECTION, POWDER, LYOPHILIZED, FOR SOLUTION INTRAVENOUS at 05:09

## 2021-09-05 RX ADMIN — PANTOPRAZOLE SODIUM 40 MG: 40 TABLET, DELAYED RELEASE ORAL at 08:09

## 2021-09-05 RX ADMIN — MICONAZOLE NITRATE 2 % TOPICAL POWDER: at 12:09

## 2021-09-05 RX ADMIN — ACETAMINOPHEN 1000 MG: 500 TABLET ORAL at 10:09

## 2021-09-05 RX ADMIN — TRAZODONE HYDROCHLORIDE 50 MG: 50 TABLET ORAL at 10:09

## 2021-09-05 RX ADMIN — CETIRIZINE HYDROCHLORIDE 10 MG: 10 TABLET, FILM COATED ORAL at 08:09

## 2021-09-05 RX ADMIN — PIPERACILLIN SODIUM AND TAZOBACTAM SODIUM 4.5 G: 4; .5 INJECTION, POWDER, LYOPHILIZED, FOR SOLUTION INTRAVENOUS at 09:09

## 2021-09-05 RX ADMIN — PIPERACILLIN SODIUM AND TAZOBACTAM SODIUM 4.5 G: 4; .5 INJECTION, POWDER, LYOPHILIZED, FOR SOLUTION INTRAVENOUS at 02:09

## 2021-09-05 RX ADMIN — POTASSIUM CHLORIDE 10 MEQ: 750 TABLET, FILM COATED, EXTENDED RELEASE ORAL at 09:09

## 2021-09-06 PROCEDURE — 99232 SBSQ HOSP IP/OBS MODERATE 35: CPT | Mod: ,,, | Performed by: HOSPITALIST

## 2021-09-06 PROCEDURE — 25000003 PHARM REV CODE 250: Performed by: HOSPITALIST

## 2021-09-06 PROCEDURE — 96372 THER/PROPH/DIAG INJ SC/IM: CPT

## 2021-09-06 PROCEDURE — 63600175 PHARM REV CODE 636 W HCPCS: Performed by: INTERNAL MEDICINE

## 2021-09-06 PROCEDURE — 25000003 PHARM REV CODE 250: Performed by: FAMILY MEDICINE

## 2021-09-06 PROCEDURE — 11000001 HC ACUTE MED/SURG PRIVATE ROOM

## 2021-09-06 PROCEDURE — 63600175 PHARM REV CODE 636 W HCPCS: Performed by: HOSPITALIST

## 2021-09-06 PROCEDURE — 94761 N-INVAS EAR/PLS OXIMETRY MLT: CPT

## 2021-09-06 PROCEDURE — 99232 PR SUBSEQUENT HOSPITAL CARE,LEVL II: ICD-10-PCS | Mod: ,,, | Performed by: HOSPITALIST

## 2021-09-06 RX ADMIN — GUAIFENESIN AND DEXTROMETHORPHAN 10 ML: 100; 10 SYRUP ORAL at 08:09

## 2021-09-06 RX ADMIN — PIPERACILLIN SODIUM AND TAZOBACTAM SODIUM 4.5 G: 4; .5 INJECTION, POWDER, LYOPHILIZED, FOR SOLUTION INTRAVENOUS at 06:09

## 2021-09-06 RX ADMIN — PIPERACILLIN SODIUM AND TAZOBACTAM SODIUM 4.5 G: 4; .5 INJECTION, POWDER, LYOPHILIZED, FOR SOLUTION INTRAVENOUS at 01:09

## 2021-09-06 RX ADMIN — ENOXAPARIN SODIUM 40 MG: 40 INJECTION SUBCUTANEOUS at 06:09

## 2021-09-06 RX ADMIN — OXYBUTYNIN CHLORIDE 15 MG: 5 TABLET, EXTENDED RELEASE ORAL at 10:09

## 2021-09-06 RX ADMIN — PIPERACILLIN SODIUM AND TAZOBACTAM SODIUM 4.5 G: 4; .5 INJECTION, POWDER, LYOPHILIZED, FOR SOLUTION INTRAVENOUS at 10:09

## 2021-09-06 RX ADMIN — CETIRIZINE HYDROCHLORIDE 10 MG: 10 TABLET, FILM COATED ORAL at 10:09

## 2021-09-06 RX ADMIN — VERAPAMIL HYDROCHLORIDE 120 MG: 120 TABLET, FILM COATED, EXTENDED RELEASE ORAL at 10:09

## 2021-09-06 RX ADMIN — PANTOPRAZOLE SODIUM 40 MG: 40 TABLET, DELAYED RELEASE ORAL at 10:09

## 2021-09-06 RX ADMIN — MICONAZOLE NITRATE 2 % TOPICAL POWDER: at 08:09

## 2021-09-06 RX ADMIN — MICONAZOLE NITRATE 2 % TOPICAL POWDER: at 10:09

## 2021-09-06 RX ADMIN — POTASSIUM CHLORIDE 10 MEQ: 750 TABLET, FILM COATED, EXTENDED RELEASE ORAL at 10:09

## 2021-09-07 PROCEDURE — 97116 GAIT TRAINING THERAPY: CPT

## 2021-09-07 PROCEDURE — 11000001 HC ACUTE MED/SURG PRIVATE ROOM

## 2021-09-07 PROCEDURE — 63600175 PHARM REV CODE 636 W HCPCS: Performed by: INTERNAL MEDICINE

## 2021-09-07 PROCEDURE — 99232 PR SUBSEQUENT HOSPITAL CARE,LEVL II: ICD-10-PCS | Mod: ,,, | Performed by: HOSPITALIST

## 2021-09-07 PROCEDURE — 63600175 PHARM REV CODE 636 W HCPCS: Performed by: HOSPITALIST

## 2021-09-07 PROCEDURE — 25000003 PHARM REV CODE 250: Performed by: HOSPITALIST

## 2021-09-07 PROCEDURE — 25000003 PHARM REV CODE 250: Performed by: FAMILY MEDICINE

## 2021-09-07 PROCEDURE — 97110 THERAPEUTIC EXERCISES: CPT | Mod: CO

## 2021-09-07 PROCEDURE — 97110 THERAPEUTIC EXERCISES: CPT

## 2021-09-07 PROCEDURE — 94761 N-INVAS EAR/PLS OXIMETRY MLT: CPT

## 2021-09-07 PROCEDURE — 99232 SBSQ HOSP IP/OBS MODERATE 35: CPT | Mod: ,,, | Performed by: HOSPITALIST

## 2021-09-07 RX ADMIN — PIPERACILLIN SODIUM AND TAZOBACTAM SODIUM 4.5 G: 4; .5 INJECTION, POWDER, LYOPHILIZED, FOR SOLUTION INTRAVENOUS at 02:09

## 2021-09-07 RX ADMIN — ENOXAPARIN SODIUM 40 MG: 40 INJECTION SUBCUTANEOUS at 05:09

## 2021-09-07 RX ADMIN — PIPERACILLIN SODIUM AND TAZOBACTAM SODIUM 4.5 G: 4; .5 INJECTION, POWDER, LYOPHILIZED, FOR SOLUTION INTRAVENOUS at 10:09

## 2021-09-07 RX ADMIN — OXYBUTYNIN CHLORIDE 15 MG: 5 TABLET, EXTENDED RELEASE ORAL at 10:09

## 2021-09-07 RX ADMIN — MICONAZOLE NITRATE 2 % TOPICAL POWDER: at 10:09

## 2021-09-07 RX ADMIN — VERAPAMIL HYDROCHLORIDE 120 MG: 120 TABLET, FILM COATED, EXTENDED RELEASE ORAL at 10:09

## 2021-09-07 RX ADMIN — CETIRIZINE HYDROCHLORIDE 10 MG: 10 TABLET, FILM COATED ORAL at 10:09

## 2021-09-07 RX ADMIN — PIPERACILLIN SODIUM AND TAZOBACTAM SODIUM 4.5 G: 4; .5 INJECTION, POWDER, LYOPHILIZED, FOR SOLUTION INTRAVENOUS at 06:09

## 2021-09-07 RX ADMIN — PANTOPRAZOLE SODIUM 40 MG: 40 TABLET, DELAYED RELEASE ORAL at 10:09

## 2021-09-07 RX ADMIN — GUAIFENESIN AND DEXTROMETHORPHAN 10 ML: 100; 10 SYRUP ORAL at 05:09

## 2021-09-07 RX ADMIN — MICONAZOLE NITRATE 2 % TOPICAL POWDER: at 08:09

## 2021-09-07 RX ADMIN — POTASSIUM CHLORIDE 10 MEQ: 750 TABLET, FILM COATED, EXTENDED RELEASE ORAL at 10:09

## 2021-09-08 VITALS
HEIGHT: 60 IN | SYSTOLIC BLOOD PRESSURE: 124 MMHG | OXYGEN SATURATION: 96 % | DIASTOLIC BLOOD PRESSURE: 84 MMHG | WEIGHT: 185.63 LBS | RESPIRATION RATE: 20 BRPM | TEMPERATURE: 98 F | HEART RATE: 75 BPM | BODY MASS INDEX: 36.44 KG/M2

## 2021-09-08 LAB
ANION GAP SERPL CALCULATED.3IONS-SCNC: 16 MMOL/L (ref 7–16)
BASOPHILS # BLD AUTO: 0.06 K/UL (ref 0–0.2)
BASOPHILS NFR BLD AUTO: 1.3 % (ref 0–1)
BUN SERPL-MCNC: 10 MG/DL (ref 7–18)
BUN/CREAT SERPL: 7 (ref 6–20)
CALCIUM SERPL-MCNC: 9.4 MG/DL (ref 8.5–10.1)
CHLORIDE SERPL-SCNC: 106 MMOL/L (ref 98–107)
CO2 SERPL-SCNC: 23 MMOL/L (ref 21–32)
CREAT SERPL-MCNC: 1.35 MG/DL (ref 0.55–1.02)
DIFFERENTIAL METHOD BLD: ABNORMAL
EOSINOPHIL # BLD AUTO: 0.18 K/UL (ref 0–0.5)
EOSINOPHIL NFR BLD AUTO: 3.8 % (ref 1–4)
ERYTHROCYTE [DISTWIDTH] IN BLOOD BY AUTOMATED COUNT: 13.7 % (ref 11.5–14.5)
GLUCOSE SERPL-MCNC: 103 MG/DL (ref 74–106)
HCT VFR BLD AUTO: 37.5 % (ref 38–47)
HGB BLD-MCNC: 12.5 G/DL (ref 12–16)
IMM GRANULOCYTES # BLD AUTO: 0.03 K/UL (ref 0–0.04)
IMM GRANULOCYTES NFR BLD: 0.6 % (ref 0–0.4)
LYMPHOCYTES # BLD AUTO: 1.39 K/UL (ref 1–4.8)
LYMPHOCYTES NFR BLD AUTO: 29.4 % (ref 27–41)
MAGNESIUM SERPL-MCNC: 2.4 MG/DL (ref 1.7–2.3)
MCH RBC QN AUTO: 29 PG (ref 27–31)
MCHC RBC AUTO-ENTMCNC: 33.3 G/DL (ref 32–36)
MCV RBC AUTO: 87 FL (ref 80–96)
MONOCYTES # BLD AUTO: 0.58 K/UL (ref 0–0.8)
MONOCYTES NFR BLD AUTO: 12.3 % (ref 2–6)
MPC BLD CALC-MCNC: 9.6 FL (ref 9.4–12.4)
NEUTROPHILS # BLD AUTO: 2.48 K/UL (ref 1.8–7.7)
NEUTROPHILS NFR BLD AUTO: 52.6 % (ref 53–65)
NRBC # BLD AUTO: 0 X10E3/UL
NRBC, AUTO (.00): 0 %
PLATELET # BLD AUTO: 364 K/UL (ref 150–400)
POTASSIUM SERPL-SCNC: 3.9 MMOL/L (ref 3.5–5.1)
RBC # BLD AUTO: 4.31 M/UL (ref 4.2–5.4)
SODIUM SERPL-SCNC: 141 MMOL/L (ref 136–145)
WBC # BLD AUTO: 4.72 K/UL (ref 4.5–11)

## 2021-09-08 PROCEDURE — 25000003 PHARM REV CODE 250: Performed by: FAMILY MEDICINE

## 2021-09-08 PROCEDURE — 80048 BASIC METABOLIC PNL TOTAL CA: CPT | Performed by: INTERNAL MEDICINE

## 2021-09-08 PROCEDURE — 99239 HOSP IP/OBS DSCHRG MGMT >30: CPT | Mod: ,,, | Performed by: HOSPITALIST

## 2021-09-08 PROCEDURE — 85025 COMPLETE CBC W/AUTO DIFF WBC: CPT | Performed by: INTERNAL MEDICINE

## 2021-09-08 PROCEDURE — 36415 COLL VENOUS BLD VENIPUNCTURE: CPT | Performed by: INTERNAL MEDICINE

## 2021-09-08 PROCEDURE — 83735 ASSAY OF MAGNESIUM: CPT | Performed by: INTERNAL MEDICINE

## 2021-09-08 PROCEDURE — 63600175 PHARM REV CODE 636 W HCPCS: Performed by: HOSPITALIST

## 2021-09-08 PROCEDURE — 25000003 PHARM REV CODE 250: Performed by: HOSPITALIST

## 2021-09-08 PROCEDURE — 99239 PR HOSPITAL DISCHARGE DAY,>30 MIN: ICD-10-PCS | Mod: ,,, | Performed by: HOSPITALIST

## 2021-09-08 RX ADMIN — POTASSIUM CHLORIDE 10 MEQ: 750 TABLET, FILM COATED, EXTENDED RELEASE ORAL at 08:09

## 2021-09-08 RX ADMIN — CETIRIZINE HYDROCHLORIDE 10 MG: 10 TABLET, FILM COATED ORAL at 08:09

## 2021-09-08 RX ADMIN — VERAPAMIL HYDROCHLORIDE 120 MG: 120 TABLET, FILM COATED, EXTENDED RELEASE ORAL at 08:09

## 2021-09-08 RX ADMIN — MICONAZOLE NITRATE 2 % TOPICAL POWDER: at 09:09

## 2021-09-08 RX ADMIN — PIPERACILLIN SODIUM AND TAZOBACTAM SODIUM 4.5 G: 4; .5 INJECTION, POWDER, LYOPHILIZED, FOR SOLUTION INTRAVENOUS at 02:09

## 2021-09-08 RX ADMIN — OXYBUTYNIN CHLORIDE 15 MG: 5 TABLET, EXTENDED RELEASE ORAL at 08:09

## 2021-09-08 RX ADMIN — PANTOPRAZOLE SODIUM 40 MG: 40 TABLET, DELAYED RELEASE ORAL at 08:09

## 2021-09-09 DIAGNOSIS — N20.0 LEFT RENAL STONE: Primary | ICD-10-CM

## 2021-10-15 ENCOUNTER — DOCUMENTATION ONLY (OUTPATIENT)
Dept: REHABILITATION | Facility: HOSPITAL | Age: 86
End: 2021-10-15

## 2021-10-15 PROBLEM — R26.9 GAIT DIFFICULTY: Status: RESOLVED | Noted: 2021-06-03 | Resolved: 2021-10-15

## 2021-10-15 PROBLEM — M19.90 OSTEOARTHRITIS: Status: RESOLVED | Noted: 2021-06-03 | Resolved: 2021-10-15

## 2021-11-10 DIAGNOSIS — N20.0 KIDNEY STONE: Primary | ICD-10-CM

## 2021-12-06 DIAGNOSIS — N20.0 LEFT RENAL STONE: Primary | ICD-10-CM

## 2021-12-13 ENCOUNTER — OFFICE VISIT (OUTPATIENT)
Dept: UROLOGY | Facility: CLINIC | Age: 86
End: 2021-12-13
Payer: MEDICARE

## 2021-12-13 ENCOUNTER — CLINICAL SUPPORT (OUTPATIENT)
Dept: CARDIOLOGY | Facility: CLINIC | Age: 86
End: 2021-12-13
Payer: MEDICARE

## 2021-12-13 ENCOUNTER — HOSPITAL ENCOUNTER (OUTPATIENT)
Dept: RADIOLOGY | Facility: HOSPITAL | Age: 86
Discharge: HOME | End: 2021-12-13
Attending: UROLOGY
Payer: MEDICARE

## 2021-12-13 VITALS
WEIGHT: 181 LBS | BODY MASS INDEX: 35.53 KG/M2 | HEIGHT: 60 IN | DIASTOLIC BLOOD PRESSURE: 78 MMHG | SYSTOLIC BLOOD PRESSURE: 123 MMHG | HEART RATE: 104 BPM

## 2021-12-13 DIAGNOSIS — R31.9 URINARY TRACT INFECTION WITH HEMATURIA, SITE UNSPECIFIED: ICD-10-CM

## 2021-12-13 DIAGNOSIS — Z01.810 PRE-PROCEDURAL CARDIOVASCULAR EXAMINATION: ICD-10-CM

## 2021-12-13 DIAGNOSIS — N39.0 URINARY TRACT INFECTION WITH HEMATURIA, SITE UNSPECIFIED: ICD-10-CM

## 2021-12-13 DIAGNOSIS — N20.0 LEFT RENAL STONE: Primary | ICD-10-CM

## 2021-12-13 DIAGNOSIS — N20.0 KIDNEY STONES: ICD-10-CM

## 2021-12-13 DIAGNOSIS — N20.0 KIDNEY STONE: ICD-10-CM

## 2021-12-13 DIAGNOSIS — Z01.818 PRE-OP TESTING: ICD-10-CM

## 2021-12-13 PROCEDURE — 74018 RADEX ABDOMEN 1 VIEW: CPT | Mod: TC

## 2021-12-13 PROCEDURE — 99214 OFFICE O/P EST MOD 30 MIN: CPT | Mod: S$PBB,,, | Performed by: UROLOGY

## 2021-12-13 PROCEDURE — 87077 CULTURE, URINE: ICD-10-PCS | Mod: ,,, | Performed by: CLINICAL MEDICAL LABORATORY

## 2021-12-13 PROCEDURE — 99212 OFFICE O/P EST SF 10 MIN: CPT | Mod: PBBFAC,27

## 2021-12-13 PROCEDURE — 99214 PR OFFICE/OUTPT VISIT, EST, LEVL IV, 30-39 MIN: ICD-10-PCS | Mod: S$PBB,,, | Performed by: UROLOGY

## 2021-12-13 PROCEDURE — 87086 CULTURE, URINE: ICD-10-PCS | Mod: ,,, | Performed by: CLINICAL MEDICAL LABORATORY

## 2021-12-13 PROCEDURE — 99214 OFFICE O/P EST MOD 30 MIN: CPT | Mod: PBBFAC | Performed by: UROLOGY

## 2021-12-13 PROCEDURE — 93010 EKG 12-LEAD: ICD-10-PCS | Mod: S$PBB,,, | Performed by: STUDENT IN AN ORGANIZED HEALTH CARE EDUCATION/TRAINING PROGRAM

## 2021-12-13 PROCEDURE — 74018 XR KUB: ICD-10-PCS | Mod: 26,,,

## 2021-12-13 PROCEDURE — 87186 SC STD MICRODIL/AGAR DIL: CPT | Mod: ,,, | Performed by: CLINICAL MEDICAL LABORATORY

## 2021-12-13 PROCEDURE — 74018 RADEX ABDOMEN 1 VIEW: CPT | Mod: 26,,,

## 2021-12-13 PROCEDURE — 87186 CULTURE, URINE: ICD-10-PCS | Mod: ,,, | Performed by: CLINICAL MEDICAL LABORATORY

## 2021-12-13 PROCEDURE — 87077 CULTURE AEROBIC IDENTIFY: CPT | Mod: ,,, | Performed by: CLINICAL MEDICAL LABORATORY

## 2021-12-13 PROCEDURE — 87086 URINE CULTURE/COLONY COUNT: CPT | Mod: ,,, | Performed by: CLINICAL MEDICAL LABORATORY

## 2021-12-13 PROCEDURE — 93010 ELECTROCARDIOGRAM REPORT: CPT | Mod: S$PBB,,, | Performed by: STUDENT IN AN ORGANIZED HEALTH CARE EDUCATION/TRAINING PROGRAM

## 2021-12-13 PROCEDURE — 93005 ELECTROCARDIOGRAM TRACING: CPT | Mod: PBBFAC | Performed by: STUDENT IN AN ORGANIZED HEALTH CARE EDUCATION/TRAINING PROGRAM

## 2021-12-13 RX ORDER — SODIUM CHLORIDE, SODIUM LACTATE, POTASSIUM CHLORIDE, CALCIUM CHLORIDE 600; 310; 30; 20 MG/100ML; MG/100ML; MG/100ML; MG/100ML
INJECTION, SOLUTION INTRAVENOUS CONTINUOUS
Status: CANCELLED | OUTPATIENT
Start: 2021-12-15

## 2021-12-14 RX ORDER — VERAPAMIL HYDROCHLORIDE 180 MG/1
180 TABLET, FILM COATED, EXTENDED RELEASE ORAL DAILY
COMMUNITY
Start: 2021-10-05

## 2021-12-15 ENCOUNTER — ANESTHESIA EVENT (OUTPATIENT)
Dept: SURGERY | Facility: HOSPITAL | Age: 86
End: 2021-12-15
Payer: MEDICARE

## 2021-12-15 ENCOUNTER — HOSPITAL ENCOUNTER (OUTPATIENT)
Facility: HOSPITAL | Age: 86
Discharge: HOME | End: 2021-12-16
Attending: UROLOGY | Admitting: UROLOGY
Payer: MEDICARE

## 2021-12-15 ENCOUNTER — ANESTHESIA (OUTPATIENT)
Dept: SURGERY | Facility: HOSPITAL | Age: 86
End: 2021-12-15
Payer: MEDICARE

## 2021-12-15 DIAGNOSIS — N20.0 KIDNEY STONE: ICD-10-CM

## 2021-12-15 DIAGNOSIS — N20.0 LEFT RENAL STONE: ICD-10-CM

## 2021-12-15 DIAGNOSIS — I10 HYPERTENSION: ICD-10-CM

## 2021-12-15 LAB — UA COMPLETE W REFLEX CULTURE PNL UR: ABNORMAL

## 2021-12-15 PROCEDURE — 27000165 HC TUBE, ETT CUFFED: Performed by: ANESTHESIOLOGY

## 2021-12-15 PROCEDURE — D9220A PRA ANESTHESIA: ICD-10-PCS | Mod: CRNA,,, | Performed by: NURSE ANESTHETIST, CERTIFIED REGISTERED

## 2021-12-15 PROCEDURE — 93005 ELECTROCARDIOGRAM TRACING: CPT

## 2021-12-15 PROCEDURE — 27201423 OPTIME MED/SURG SUP & DEVICES STERILE SUPPLY: Performed by: UROLOGY

## 2021-12-15 PROCEDURE — 63600175 PHARM REV CODE 636 W HCPCS: Performed by: UROLOGY

## 2021-12-15 PROCEDURE — C1894 INTRO/SHEATH, NON-LASER: HCPCS | Performed by: UROLOGY

## 2021-12-15 PROCEDURE — 37000008 HC ANESTHESIA 1ST 15 MINUTES: Performed by: UROLOGY

## 2021-12-15 PROCEDURE — 25000003 PHARM REV CODE 250: Performed by: NURSE ANESTHETIST, CERTIFIED REGISTERED

## 2021-12-15 PROCEDURE — 71000033 HC RECOVERY, INTIAL HOUR: Performed by: UROLOGY

## 2021-12-15 PROCEDURE — 25000003 PHARM REV CODE 250

## 2021-12-15 PROCEDURE — D9220A PRA ANESTHESIA: ICD-10-PCS | Mod: ANES,ICN,, | Performed by: ANESTHESIOLOGY

## 2021-12-15 PROCEDURE — 36000707: Performed by: UROLOGY

## 2021-12-15 PROCEDURE — 93010 ELECTROCARDIOGRAM REPORT: CPT | Mod: ,,, | Performed by: STUDENT IN AN ORGANIZED HEALTH CARE EDUCATION/TRAINING PROGRAM

## 2021-12-15 PROCEDURE — 27000510 HC BLANKET BAIR HUGGER ANY SIZE: Performed by: ANESTHESIOLOGY

## 2021-12-15 PROCEDURE — 27000655: Performed by: ANESTHESIOLOGY

## 2021-12-15 PROCEDURE — 27000716 HC OXISENSOR PROBE, ANY SIZE: Performed by: ANESTHESIOLOGY

## 2021-12-15 PROCEDURE — 36000706: Performed by: UROLOGY

## 2021-12-15 PROCEDURE — 37000009 HC ANESTHESIA EA ADD 15 MINS: Performed by: UROLOGY

## 2021-12-15 PROCEDURE — C1729 CATH, DRAINAGE: HCPCS | Performed by: UROLOGY

## 2021-12-15 PROCEDURE — C2617 STENT, NON-COR, TEM W/O DEL: HCPCS | Performed by: UROLOGY

## 2021-12-15 PROCEDURE — 93010 EKG 12-LEAD: ICD-10-PCS | Mod: ,,, | Performed by: STUDENT IN AN ORGANIZED HEALTH CARE EDUCATION/TRAINING PROGRAM

## 2021-12-15 PROCEDURE — 25000003 PHARM REV CODE 250: Performed by: UROLOGY

## 2021-12-15 PROCEDURE — 52356 PR CYSTO/URETERO W/LITHOTRIPSY: ICD-10-PCS | Mod: LT,,, | Performed by: UROLOGY

## 2021-12-15 PROCEDURE — C1769 GUIDE WIRE: HCPCS | Performed by: UROLOGY

## 2021-12-15 PROCEDURE — D9220A PRA ANESTHESIA: Mod: ANES,ICN,, | Performed by: ANESTHESIOLOGY

## 2021-12-15 PROCEDURE — 63600175 PHARM REV CODE 636 W HCPCS: Performed by: NURSE ANESTHETIST, CERTIFIED REGISTERED

## 2021-12-15 PROCEDURE — 52356 CYSTO/URETERO W/LITHOTRIPSY: CPT | Mod: LT,,, | Performed by: UROLOGY

## 2021-12-15 PROCEDURE — D9220A PRA ANESTHESIA: Mod: CRNA,,, | Performed by: NURSE ANESTHETIST, CERTIFIED REGISTERED

## 2021-12-15 DEVICE — STENT UNIVERSA SOFT URETERAL 8FR X 22-32: Type: IMPLANTABLE DEVICE | Site: URETER | Status: FUNCTIONAL

## 2021-12-15 RX ORDER — MEPERIDINE HYDROCHLORIDE 25 MG/ML
25 INJECTION INTRAMUSCULAR; INTRAVENOUS; SUBCUTANEOUS EVERY 10 MIN PRN
Status: DISCONTINUED | OUTPATIENT
Start: 2021-12-15 | End: 2021-12-15 | Stop reason: HOSPADM

## 2021-12-15 RX ORDER — LIDOCAINE HYDROCHLORIDE 10 MG/ML
1 INJECTION, SOLUTION EPIDURAL; INFILTRATION; INTRACAUDAL; PERINEURAL ONCE
Status: DISCONTINUED | OUTPATIENT
Start: 2021-12-15 | End: 2021-12-16 | Stop reason: HOSPADM

## 2021-12-15 RX ORDER — CEFAZOLIN SODIUM 2 G/50ML
2 SOLUTION INTRAVENOUS ONCE
Status: DISCONTINUED | OUTPATIENT
Start: 2021-12-15 | End: 2021-12-15

## 2021-12-15 RX ORDER — EPHEDRINE SULFATE/0.9% NACL/PF 50 MG/10ML
SYRINGE (ML) INTRAVENOUS
Status: DISCONTINUED | OUTPATIENT
Start: 2021-12-15 | End: 2021-12-15

## 2021-12-15 RX ORDER — FENTANYL CITRATE 50 UG/ML
INJECTION, SOLUTION INTRAMUSCULAR; INTRAVENOUS
Status: DISCONTINUED | OUTPATIENT
Start: 2021-12-15 | End: 2021-12-15

## 2021-12-15 RX ORDER — SODIUM CHLORIDE, SODIUM LACTATE, POTASSIUM CHLORIDE, CALCIUM CHLORIDE 600; 310; 30; 20 MG/100ML; MG/100ML; MG/100ML; MG/100ML
125 INJECTION, SOLUTION INTRAVENOUS CONTINUOUS
Status: DISCONTINUED | OUTPATIENT
Start: 2021-12-15 | End: 2021-12-15

## 2021-12-15 RX ORDER — DIPHENHYDRAMINE HYDROCHLORIDE 50 MG/ML
25 INJECTION INTRAMUSCULAR; INTRAVENOUS EVERY 6 HOURS PRN
Status: DISCONTINUED | OUTPATIENT
Start: 2021-12-15 | End: 2021-12-15 | Stop reason: HOSPADM

## 2021-12-15 RX ORDER — SODIUM CHLORIDE, SODIUM LACTATE, POTASSIUM CHLORIDE, CALCIUM CHLORIDE 600; 310; 30; 20 MG/100ML; MG/100ML; MG/100ML; MG/100ML
INJECTION, SOLUTION INTRAVENOUS CONTINUOUS
Status: DISCONTINUED | OUTPATIENT
Start: 2021-12-15 | End: 2021-12-16 | Stop reason: HOSPADM

## 2021-12-15 RX ORDER — PROPOFOL 10 MG/ML
VIAL (ML) INTRAVENOUS
Status: DISCONTINUED | OUTPATIENT
Start: 2021-12-15 | End: 2021-12-15

## 2021-12-15 RX ORDER — HYDROCODONE BITARTRATE AND ACETAMINOPHEN 5; 325 MG/1; MG/1
1 TABLET ORAL EVERY 4 HOURS PRN
Status: DISCONTINUED | OUTPATIENT
Start: 2021-12-15 | End: 2021-12-16 | Stop reason: HOSPADM

## 2021-12-15 RX ORDER — ROCURONIUM BROMIDE 10 MG/ML
INJECTION, SOLUTION INTRAVENOUS
Status: DISCONTINUED | OUTPATIENT
Start: 2021-12-15 | End: 2021-12-15

## 2021-12-15 RX ORDER — GENTAMICIN SULFATE 80 MG/100ML
INJECTION, SOLUTION INTRAVENOUS
Status: DISCONTINUED | OUTPATIENT
Start: 2021-12-15 | End: 2021-12-15

## 2021-12-15 RX ORDER — MORPHINE SULFATE 10 MG/ML
4 INJECTION INTRAMUSCULAR; INTRAVENOUS; SUBCUTANEOUS EVERY 5 MIN PRN
Status: DISCONTINUED | OUTPATIENT
Start: 2021-12-15 | End: 2021-12-15 | Stop reason: HOSPADM

## 2021-12-15 RX ORDER — CEFAZOLIN SODIUM 1 G/3ML
INJECTION, POWDER, FOR SOLUTION INTRAMUSCULAR; INTRAVENOUS
Status: DISCONTINUED | OUTPATIENT
Start: 2021-12-15 | End: 2021-12-15

## 2021-12-15 RX ORDER — ONDANSETRON 2 MG/ML
4 INJECTION INTRAMUSCULAR; INTRAVENOUS DAILY PRN
Status: DISCONTINUED | OUTPATIENT
Start: 2021-12-15 | End: 2021-12-15 | Stop reason: HOSPADM

## 2021-12-15 RX ORDER — LIDOCAINE HYDROCHLORIDE 20 MG/ML
INJECTION, SOLUTION EPIDURAL; INFILTRATION; INTRACAUDAL; PERINEURAL
Status: DISCONTINUED | OUTPATIENT
Start: 2021-12-15 | End: 2021-12-15

## 2021-12-15 RX ORDER — ONDANSETRON 2 MG/ML
4 INJECTION INTRAMUSCULAR; INTRAVENOUS EVERY 12 HOURS PRN
Status: DISCONTINUED | OUTPATIENT
Start: 2021-12-15 | End: 2021-12-16 | Stop reason: HOSPADM

## 2021-12-15 RX ORDER — HYDROMORPHONE HYDROCHLORIDE 2 MG/ML
0.5 INJECTION, SOLUTION INTRAMUSCULAR; INTRAVENOUS; SUBCUTANEOUS EVERY 5 MIN PRN
Status: DISCONTINUED | OUTPATIENT
Start: 2021-12-15 | End: 2021-12-15 | Stop reason: HOSPADM

## 2021-12-15 RX ADMIN — ROCURONIUM BROMIDE 40 MG: 10 INJECTION, SOLUTION INTRAVENOUS at 09:12

## 2021-12-15 RX ADMIN — GENTAMICIN SULFATE 80 MG: 80 INJECTION, SOLUTION INTRAVENOUS at 09:12

## 2021-12-15 RX ADMIN — PROPOFOL 200 MG: 10 INJECTION, EMULSION INTRAVENOUS at 09:12

## 2021-12-15 RX ADMIN — LIDOCAINE HYDROCHLORIDE 100 MG: 20 INJECTION, SOLUTION INTRAVENOUS at 09:12

## 2021-12-15 RX ADMIN — SODIUM CHLORIDE, POTASSIUM CHLORIDE, SODIUM LACTATE AND CALCIUM CHLORIDE: 600; 310; 30; 20 INJECTION, SOLUTION INTRAVENOUS at 08:12

## 2021-12-15 RX ADMIN — CEFAZOLIN 1 G: 1 INJECTION, POWDER, FOR SOLUTION INTRAMUSCULAR; INTRAVENOUS at 04:12

## 2021-12-15 RX ADMIN — FENTANYL CITRATE 100 MCG: 50 INJECTION INTRAMUSCULAR; INTRAVENOUS at 09:12

## 2021-12-15 RX ADMIN — CEFAZOLIN 2 G: 1 INJECTION, POWDER, FOR SOLUTION INTRAMUSCULAR; INTRAVENOUS; PARENTERAL at 09:12

## 2021-12-15 RX ADMIN — SODIUM CHLORIDE, POTASSIUM CHLORIDE, SODIUM LACTATE AND CALCIUM CHLORIDE: 600; 310; 30; 20 INJECTION, SOLUTION INTRAVENOUS at 05:12

## 2021-12-15 RX ADMIN — HYDROCODONE BITARTRATE AND ACETAMINOPHEN 1 TABLET: 5; 325 TABLET ORAL at 10:12

## 2021-12-15 RX ADMIN — Medication 50 MG: at 09:12

## 2021-12-16 VITALS
HEART RATE: 84 BPM | RESPIRATION RATE: 18 BRPM | SYSTOLIC BLOOD PRESSURE: 128 MMHG | DIASTOLIC BLOOD PRESSURE: 74 MMHG | BODY MASS INDEX: 32.27 KG/M2 | HEIGHT: 64 IN | TEMPERATURE: 98 F | WEIGHT: 189 LBS | OXYGEN SATURATION: 99 %

## 2021-12-16 DIAGNOSIS — N20.0 KIDNEY STONES: Primary | ICD-10-CM

## 2021-12-16 PROCEDURE — 63600175 PHARM REV CODE 636 W HCPCS: Performed by: UROLOGY

## 2021-12-16 PROCEDURE — 25000003 PHARM REV CODE 250: Performed by: UROLOGY

## 2021-12-16 RX ADMIN — SODIUM CHLORIDE, POTASSIUM CHLORIDE, SODIUM LACTATE AND CALCIUM CHLORIDE: 600; 310; 30; 20 INJECTION, SOLUTION INTRAVENOUS at 03:12

## 2021-12-16 RX ADMIN — CEFAZOLIN 1 G: 1 INJECTION, POWDER, FOR SOLUTION INTRAMUSCULAR; INTRAVENOUS at 08:12

## 2021-12-16 RX ADMIN — CEFAZOLIN 1 G: 1 INJECTION, POWDER, FOR SOLUTION INTRAMUSCULAR; INTRAVENOUS at 12:12

## 2021-12-27 DIAGNOSIS — N39.0 URINARY TRACT INFECTION WITH HEMATURIA, SITE UNSPECIFIED: Primary | ICD-10-CM

## 2021-12-27 DIAGNOSIS — R31.9 URINARY TRACT INFECTION WITH HEMATURIA, SITE UNSPECIFIED: Primary | ICD-10-CM

## 2021-12-27 RX ORDER — AMOXICILLIN AND CLAVULANATE POTASSIUM 875; 125 MG/1; MG/1
1 TABLET, FILM COATED ORAL 2 TIMES DAILY
Qty: 20 TABLET | Refills: 0 | Status: SHIPPED | OUTPATIENT
Start: 2021-12-27 | End: 2022-01-06

## 2022-02-15 ENCOUNTER — HOSPITAL ENCOUNTER (OUTPATIENT)
Dept: RADIOLOGY | Facility: HOSPITAL | Age: 87
Discharge: HOME | End: 2022-02-15
Attending: UROLOGY
Payer: MEDICARE

## 2022-02-15 ENCOUNTER — OFFICE VISIT (OUTPATIENT)
Dept: UROLOGY | Facility: CLINIC | Age: 87
End: 2022-02-15
Payer: MEDICARE

## 2022-02-15 VITALS
SYSTOLIC BLOOD PRESSURE: 110 MMHG | BODY MASS INDEX: 36.91 KG/M2 | HEIGHT: 60 IN | WEIGHT: 188 LBS | DIASTOLIC BLOOD PRESSURE: 76 MMHG

## 2022-02-15 DIAGNOSIS — N39.0 URINARY TRACT INFECTION WITH HEMATURIA, SITE UNSPECIFIED: ICD-10-CM

## 2022-02-15 DIAGNOSIS — Z46.6 ENCOUNTER FOR REMOVAL OF URETERAL STENT: Primary | ICD-10-CM

## 2022-02-15 DIAGNOSIS — N20.0 LEFT RENAL STONE: ICD-10-CM

## 2022-02-15 DIAGNOSIS — N20.0 KIDNEY STONES: ICD-10-CM

## 2022-02-15 DIAGNOSIS — R31.9 URINARY TRACT INFECTION WITH HEMATURIA, SITE UNSPECIFIED: ICD-10-CM

## 2022-02-15 PROCEDURE — 99024 POSTOP FOLLOW-UP VISIT: CPT | Mod: ,,, | Performed by: UROLOGY

## 2022-02-15 PROCEDURE — 74176 CT RENAL STONE STUDY ABD PELVIS WO: ICD-10-PCS | Mod: 26,,, | Performed by: RADIOLOGY

## 2022-02-15 PROCEDURE — 87077 CULTURE, URINE: ICD-10-PCS | Mod: ,,, | Performed by: CLINICAL MEDICAL LABORATORY

## 2022-02-15 PROCEDURE — 52310 PR CYSTOSCOPY,REMV CALCULUS,SIMPLE: ICD-10-PCS | Mod: S$PBB,,, | Performed by: UROLOGY

## 2022-02-15 PROCEDURE — 99024 PR POST-OP FOLLOW-UP VISIT: ICD-10-PCS | Mod: ,,, | Performed by: UROLOGY

## 2022-02-15 PROCEDURE — 87086 CULTURE, URINE: ICD-10-PCS | Mod: ,,, | Performed by: CLINICAL MEDICAL LABORATORY

## 2022-02-15 PROCEDURE — 74176 CT ABD & PELVIS W/O CONTRAST: CPT | Mod: TC

## 2022-02-15 PROCEDURE — 52310 CYSTOSCOPY AND TREATMENT: CPT | Mod: S$PBB,,, | Performed by: UROLOGY

## 2022-02-15 PROCEDURE — 99213 OFFICE O/P EST LOW 20 MIN: CPT | Mod: PBBFAC,25 | Performed by: UROLOGY

## 2022-02-15 PROCEDURE — 74176 CT ABD & PELVIS W/O CONTRAST: CPT | Mod: 26,,, | Performed by: RADIOLOGY

## 2022-02-15 PROCEDURE — 87186 CULTURE, URINE: ICD-10-PCS | Mod: ,,, | Performed by: CLINICAL MEDICAL LABORATORY

## 2022-02-15 PROCEDURE — 87186 SC STD MICRODIL/AGAR DIL: CPT | Mod: ,,, | Performed by: CLINICAL MEDICAL LABORATORY

## 2022-02-15 PROCEDURE — 52310 CYSTOSCOPY AND TREATMENT: CPT | Mod: PBBFAC | Performed by: UROLOGY

## 2022-02-15 PROCEDURE — 87077 CULTURE AEROBIC IDENTIFY: CPT | Mod: ,,, | Performed by: CLINICAL MEDICAL LABORATORY

## 2022-02-15 PROCEDURE — 87086 URINE CULTURE/COLONY COUNT: CPT | Mod: ,,, | Performed by: CLINICAL MEDICAL LABORATORY

## 2022-02-15 RX ORDER — LIDOCAINE HYDROCHLORIDE 20 MG/ML
JELLY TOPICAL
Status: COMPLETED | OUTPATIENT
Start: 2022-02-15 | End: 2022-02-15

## 2022-02-15 RX ORDER — MULTIVITAMIN
1 TABLET ORAL DAILY
COMMUNITY

## 2022-02-15 RX ADMIN — LIDOCAINE HYDROCHLORIDE 10 ML: 20 JELLY TOPICAL at 01:02

## 2022-02-15 NOTE — PROCEDURES
Procedures     Rigid ureteroscopy with stent removal.    Preoperative diagnosis:  Left renal pelvic stone with previous sepsis and with previous left ureterorenoscopy with laser lithotripsy of stone followed by left ureteral stent insertion    Postoperative diagnosis:  Same as preop    Procedure:  Cystoscopy with left ureteral stent removal    Description:  The patient was taken to the clinic cystoscopy room.  She was placed in the dorsal lithotomy position and the urethra was anesthetized with lidocaine jelly.  No sedation was given.  The urethra calibrated with [place] bougie through 24 [place].  The 22 Pakistani Storz rigid cystoscope was passed.  The stent was seen protruding from the left ureteral orifice.  The stent was grasped with the cold cup biopsy forceps with 0 degree lens and removed intact.  The patient tolerated procedure well and left for regular exam room in satisfactory condition.  There was no blood loss and there were no complications.

## 2022-02-15 NOTE — PROGRESS NOTES
Subjective:       Patient ID: Whitley Millard is a 88 y.o. female.    Chief Complaint: Follow-up (Renal colic CT)    HPI: Patient is an 88 year old female presenting to [facility] as a direct admission from [facility] in [place] for acute cholecystitis. She reports that she has had some symptoms including fever, nausea, vomiting, and diarrhea for a long time, but currently is not having any of those symptoms. The cholecystitis was apparently noted at [place] when she presented with complaints of nausea. She has a pmhx of macular degeneration, HTN, GERD, and back pain. At the time of my exam, she was resting comfortably in bed in no acute distress. Vitals were all stable, although her blood pressure was a little low at 89/57 with lactated ringers being hung while I was in the room. She is afebrile, and HR is WNLs. On exam, cardiac and respiratory exam are normal. She has no abdominal distention or tenderness to deep palpation. She does have some increased redness, warmth, and pain on palpation to the right calf muscle. She states she has never had a blood clot in her legs before.      Patient will be admitted to inpatient tele to Dr. Marcano for the evaluation and treatment of acute cholecystitis. Dr. Loyd, surgery, has been made aware of patient and will plan to take her to surgery tomorrow.   -------------------------------------------------------------------------------------------------------------------------------------------------  The above note is from the history and physical of Dr. Therese Cowan from August 25.  Received verbal consult from Dr. Marcano earlier today about Ms. Millard because of new finding left renal pelvic stone.    Reviewed the CT study with Dr. Marcano and subsequently with Dr. Millard, Ms. Millard's son.  He there is about a 13 or 14 mm stone in the left renal pelvis with mild obstruction, but I suspect that the patient is not having sepsis related to stone.  She denies  flank pain and says she has not had any flank pain previously.  Apparently not having any problems from her gallstones either as she denies any abdominal pain.    Patient has history of recurring urinary tract infections and has been on methenamine hippurate for the last few months.  Apparently has been taking her medication on a regular basis  I do not think she needs to have ureteral stent at this time.  She does need elective treatment of the left renal stone.  That can be done at a future time but she needs to continue on suppression of UTIs.  We will see what her urine culture grows on this admission.  Dr. Romo  is aware of consultation and will see if needed over weekend.  Please call me if needed.     Electronically signed by Hernando Hanson Jr., MD at 8/27/2021  6:28 PM  -----------------------------------------------------------------------------------------------------------------------------------------------------  Ms. Millard continues to feel well and has no new complaints.  She is going to need stone in the left upper tract managed but we would like to give her a few weeks to recover from her current problems.  She is asymptomatic in regards to the stone.  We will see her in about a month with a KUB. (9/7/2021)  ------------------------------------------------------------------------------------------------------------------------------------------------------------------------------------------------------------------------------------------------------------------------------------------------------  Mrs. Millard is scheduled for left ureterorenoscopy with laser lithotripsy of large stone at the level of the left renal pelvis (or upper left ureter) on Wednesday December 15. She is doing okay and is back to her baseline.  We thought she might have had a gallbladder attack when she was hospitalized but that is not certain.  She is on methenamine hippurate  for suppression of chronic UTI and has  had no trouble taking that except for the size of the medication.  She has had problems with urinary control for several years.  On oxybutynin which was prescribed by Dr. Rishi Appiah.  Still has some control issues but improved while taking medication.  Stone was found incidentally when she was acutely ill but was felt she needed have this removed because of the size of the stone and likelihood that she will get septic from it.  Has history of chronic UTIs.  Feeling reasonably well today.  She is on walker but has to have that on a regular basis because of problems with her knees.  EKG while she was in the hospital revealed atrial fibrillation.  She is on no anticoagulation. (December 13, 2021)    Ms. Millard had her left ureterorenoscopy with laser lithotripsy of the left renal pelvic stone on December 15. Her stent has not bothered her but it is time for that to be removed.  She does have itching and irritation and sounds like she probably has a yeast infection.  She did go home on antibiotics but has had no recent antibiotics.  Otherwise has no complaints.  No left flank pain or other problems etc..  In today with her daughter in law.  In good spirits.  (February 15, 2022)         Review of Systems      Objective:      Physical Exam  Constitutional:       Appearance: Normal appearance. She is normal weight.   Abdominal:      Tenderness: There is no right CVA tenderness or left CVA tenderness.   Neurological:      Mental Status: She is alert.   Psychiatric:         Mood and Affect: Mood normal.         Behavior: Behavior normal.       urinalysis revealed many pus cells and red cells with some bacteria.  Dipstick had 3+ blood, 2+ leukocytes, pH of 5.0, specific gravity 1.025, and was nitrite positive  Assessment:       Problem List Items Addressed This Visit    None     Visit Diagnoses     Encounter for removal of ureteral stent    -  Primary    Relevant Medications    LIDOcaine HCl 2% urojet (Completed)     Urinary tract infection with hematuria, site unspecified        Relevant Orders    Urine culture (Completed)    Left renal stone              Plan:     left ureteral stent removed as described.  Urine sent for culture.  Patient given Cipro 500 mg b.i.d. for 3 days while awaiting culture results.  Ms. Millard was advised that she may see stone fragments pass since stent has been removed now.  We will see her again in a couple months with another CT scan since stone fragments are hard to see on plain film.  We will see sooner for problems.  *

## 2022-02-17 ENCOUNTER — TELEPHONE (OUTPATIENT)
Dept: UROLOGY | Facility: CLINIC | Age: 87
End: 2022-02-17
Payer: MEDICARE

## 2022-02-17 LAB — UA COMPLETE W REFLEX CULTURE PNL UR: ABNORMAL

## 2022-02-17 RX ORDER — FLUCONAZOLE 100 MG/1
100 TABLET ORAL DAILY
Qty: 8 TABLET | Refills: 0 | Status: SHIPPED | OUTPATIENT
Start: 2022-02-17 | End: 2022-02-25

## 2022-02-17 RX ORDER — SULFAMETHOXAZOLE AND TRIMETHOPRIM 800; 160 MG/1; MG/1
1 TABLET ORAL 2 TIMES DAILY
Qty: 20 TABLET | Refills: 0 | Status: SHIPPED | OUTPATIENT
Start: 2022-02-17

## 2022-02-17 NOTE — PATIENT INSTRUCTIONS
Left ureteral stent removed as described.  Urine sent for culture.  Patient given Cipro 500 mg b.i.d. for 3 days while awaiting culture results.  Ms. Millard was advised that she may see stone fragments pass since stent has been removed now.  We will see her again in a couple months with another CT scan since stone fragments are hard to see on plain film.  We will see sooner for problems.

## 2022-02-17 NOTE — TELEPHONE ENCOUNTER
Telephone call to Dr. August Millard about Ms. Millard.  The patient will be set up for CT scan by renal colic protocol in about 2 months and we will see her that day.  Urine culture from Tuesday grew >100,000, colonies of Serratia marcescens.  That is the same organism she had before but the sensitivities have changed.  We are going to treat her with Bactrim DS 1 b.i.d. 10 days.  She is to take with probiotics.  After completing the Bactrim DS she will resume taking methenamine hippurate and she will also take Diflucan.  Prescription sent in for Diflucan 100 mg #8 with instructions to take 2 tablets on day 1 and 1 tablet there after.  Do not start Diflucan until after completing Bactrim DS.  Prescriptions sent to [facility] in [place], [place].

## 2022-02-18 ENCOUNTER — TELEPHONE (OUTPATIENT)
Dept: UROLOGY | Facility: CLINIC | Age: 87
End: 2022-02-18
Payer: MEDICARE

## 2022-02-18 DIAGNOSIS — N20.0 LEFT RENAL STONE: Primary | ICD-10-CM

## 2022-02-18 NOTE — TELEPHONE ENCOUNTER
"Order was printed and I phoned these in:    Spoke with pt and reported urine culture results >100,000 Serratia. Spoke with pharmacist Shaye at [facility] and placed order for Bactrim DS 1 tablet BID # 20 (hold Methenamine Hippurate until drug completed then restart). After completing Bactrim DS Start Fluconazole 100 mg tablet # 8 "take 2 tablets on day one then 1 tablet  Daily".     "

## 2022-03-02 DIAGNOSIS — N20.0 LEFT RENAL STONE: Primary | ICD-10-CM

## 2022-04-05 ENCOUNTER — OFFICE VISIT (OUTPATIENT)
Dept: UROLOGY | Facility: CLINIC | Age: 87
End: 2022-04-05
Payer: MEDICARE

## 2022-04-05 ENCOUNTER — HOSPITAL ENCOUNTER (OUTPATIENT)
Dept: RADIOLOGY | Facility: HOSPITAL | Age: 87
Discharge: HOME | End: 2022-04-05
Attending: UROLOGY
Payer: MEDICARE

## 2022-04-05 VITALS
HEART RATE: 91 BPM | SYSTOLIC BLOOD PRESSURE: 125 MMHG | WEIGHT: 188 LBS | BODY MASS INDEX: 36.91 KG/M2 | HEIGHT: 60 IN | DIASTOLIC BLOOD PRESSURE: 77 MMHG

## 2022-04-05 DIAGNOSIS — Z09 POSTOP CHECK: ICD-10-CM

## 2022-04-05 DIAGNOSIS — N20.0 KIDNEY STONE: Primary | ICD-10-CM

## 2022-04-05 DIAGNOSIS — N20.0 LEFT RENAL STONE: ICD-10-CM

## 2022-04-05 DIAGNOSIS — N39.0 URINARY TRACT INFECTION WITHOUT HEMATURIA, SITE UNSPECIFIED: ICD-10-CM

## 2022-04-05 PROCEDURE — 99214 OFFICE O/P EST MOD 30 MIN: CPT | Mod: PBBFAC,25 | Performed by: UROLOGY

## 2022-04-05 PROCEDURE — 99024 PR POST-OP FOLLOW-UP VISIT: ICD-10-PCS | Mod: ,,, | Performed by: UROLOGY

## 2022-04-05 PROCEDURE — 87086 CULTURE, URINE: ICD-10-PCS | Mod: ,,, | Performed by: CLINICAL MEDICAL LABORATORY

## 2022-04-05 PROCEDURE — 99024 POSTOP FOLLOW-UP VISIT: CPT | Mod: ,,, | Performed by: UROLOGY

## 2022-04-05 PROCEDURE — 74176 CT ABD & PELVIS W/O CONTRAST: CPT | Mod: TC

## 2022-04-05 PROCEDURE — 74176 CT RENAL STONE STUDY ABD PELVIS WO: ICD-10-PCS | Mod: 26,,, | Performed by: RADIOLOGY

## 2022-04-05 PROCEDURE — 74176 CT ABD & PELVIS W/O CONTRAST: CPT | Mod: 26,,, | Performed by: RADIOLOGY

## 2022-04-05 PROCEDURE — 87086 URINE CULTURE/COLONY COUNT: CPT | Mod: ,,, | Performed by: CLINICAL MEDICAL LABORATORY

## 2022-04-05 NOTE — PROGRESS NOTES
Subjective:       Patient ID: Whitley Millard is a 88 y.o. female.    Chief Complaint: Follow-up (Renal colic CT)    HPI: Patient is an 88 year old female presenting to [facility] as a direct admission from [facility] in [place] for acute cholecystitis. She reports that she has had some symptoms including fever, nausea, vomiting, and diarrhea for a long time, but currently is not having any of those symptoms. The cholecystitis was apparently noted at [place] when she presented with complaints of nausea. She has a pmhx of macular degeneration, HTN, GERD, and back pain. At the time of my exam, she was resting comfortably in bed in no acute distress. Vitals were all stable, although her blood pressure was a little low at 89/57 with lactated ringers being hung while I was in the room. She is afebrile, and HR is WNLs. On exam, cardiac and respiratory exam are normal. She has no abdominal distention or tenderness to deep palpation. She does have some increased redness, warmth, and pain on palpation to the right calf muscle. She states she has never had a blood clot in her legs before.      Patient will be admitted to inpatient tele to Dr. Marcano for the evaluation and treatment of acute cholecystitis. Dr. Loyd, surgery, has been made aware of patient and will plan to take her to surgery tomorrow.   -------------------------------------------------------------------------------------------------------------------------------------------------  The above note is from the history and physical of Dr. Therese Cowan from August 25.  Received verbal consult from Dr. Marcano earlier today about Ms. Millard because of new finding left renal pelvic stone.    Reviewed the CT study with Dr. Marcano and subsequently with Dr. Millard, Ms. Millard's son.  He there is about a 13 or 14 mm stone in the left renal pelvis with mild obstruction, but I suspect that the patient is not having sepsis related to stone.  She denies  flank pain and says she has not had any flank pain previously.  Apparently not having any problems from her gallstones either as she denies any abdominal pain.    Patient has history of recurring urinary tract infections and has been on methenamine hippurate for the last few months.  Apparently has been taking her medication on a regular basis  I do not think she needs to have ureteral stent at this time.  She does need elective treatment of the left renal stone.  That can be done at a future time but she needs to continue on suppression of UTIs.  We will see what her urine culture grows on this admission.  Dr. Romo  is aware of consultation and will see if needed over weekend.  Please call me if needed.     Electronically signed by Hernando Hanson Jr., MD at 8/27/2021  6:28 PM  -----------------------------------------------------------------------------------------------------------------------------------------------------  Ms. Millard continues to feel well and has no new complaints.  She is going to need stone in the left upper tract managed but we would like to give her a few weeks to recover from her current problems.  She is asymptomatic in regards to the stone.  We will see her in about a month with a KUB. (9/7/2021)  ------------------------------------------------------------------------------------------------------------------------------------------------------------------------------------------------------------------------------------------------------------------------------------------------------  Mrs. Millard is scheduled for left ureterorenoscopy with laser lithotripsy of large stone at the level of the left renal pelvis (or upper left ureter) on Wednesday December 15. She is doing okay and is back to her baseline.  We thought she might have had a gallbladder attack when she was hospitalized but that is not certain.  She is on methenamine hippurate  for suppression of chronic UTI and has  had no trouble taking that except for the size of the medication.  She has had problems with urinary control for several years.  On oxybutynin which was prescribed by Dr. Rishi Appiah.  Still has some control issues but improved while taking medication.  Stone was found incidentally when she was acutely ill but was felt she needed have this removed because of the size of the stone and likelihood that she will get septic from it.  Has history of chronic UTIs.  Feeling reasonably well today.  She is on walker but has to have that on a regular basis because of problems with her knees.  EKG while she was in the hospital revealed atrial fibrillation.  She is on no anticoagulation. (December 13, 2021)     Ms. Millard had her left ureterorenoscopy with laser lithotripsy of the left renal pelvic stone on December 15. Her stent has not bothered her but it is time for that to be removed.  She does have itching and irritation and sounds like she probably has a yeast infection.  She did go home on antibiotics but has had no recent antibiotics.  Otherwise has no complaints.  No left flank pain or other problems etc..  In today with her daughter in law.  In good spirits.  (February 15, 2022)      Ms. Millard had her left ureteral stent removed on last visit on February 15th.  She has been doing well and has not been having any pain related to stones.  Has not been aware of  passing anything else since that time.  She has had no ureteral colic.  In today for her CT scan and follow-up to see how much stone burden she has left.  Basically asymptomatic.  She is on methenamine hippurate for suppression of chronic UTIs.  (April 5, 2022)    Review of Systems      Objective:      Physical Exam  Constitutional:       Appearance: Normal appearance. She is normal weight.   Neurological:      Mental Status: She is alert.   Psychiatric:         Mood and Affect: Mood normal.         Behavior: Behavior normal.       urinalysis shows 3-4 pus  cells with occasional red cells.  The dipstick has 1+ protein with pH 5.0 and specific gravity 1.030  Assessment:       Problem List Items Addressed This Visit    None     Visit Diagnoses     Kidney stone    -  Primary    Relevant Orders    CT Renal Stone Study ABD Pelvis WO    Urinary tract infection without hematuria, site unspecified        Relevant Orders    Urine culture (Completed)    Postop check              Plan:     CT reviewed with patient and her son.  There is 1 stone fragment in the lower pole of the left kidney and another stone in the distal left ureter.  There is no obstructive uropathy and she is having no symptoms related to the stones.  I think she can pass both of them probably.  Urine sent for culture to make sure there is not active infection.  Otherwise will stay on the methenamine hippurate.  Three month appointment with repeat renal colic CT or sooner if needed  *

## 2022-04-06 NOTE — PATIENT INSTRUCTIONS
CT reviewed with patient and her son.  There is 1 stone fragment in the lower pole of the left kidney and another stone in the distal left ureter.  There is no obstructive uropathy and she is having no symptoms related to the stones.  I think she can pass both of them probably.  Urine sent for culture to make sure there is not active infection.  Otherwise will stay on the methenamine hippurate.  Three month appointment with repeat renal colic CT or sooner if needed.

## 2022-04-08 LAB — UA COMPLETE W REFLEX CULTURE PNL UR: NORMAL

## 2022-07-13 ENCOUNTER — HOSPITAL ENCOUNTER (OUTPATIENT)
Dept: RADIOLOGY | Facility: HOSPITAL | Age: 87
Discharge: HOME | End: 2022-07-13
Attending: UROLOGY
Payer: MEDICARE

## 2022-07-13 ENCOUNTER — OFFICE VISIT (OUTPATIENT)
Dept: UROLOGY | Facility: CLINIC | Age: 87
End: 2022-07-13
Payer: MEDICARE

## 2022-07-13 VITALS
HEIGHT: 60 IN | HEART RATE: 73 BPM | SYSTOLIC BLOOD PRESSURE: 113 MMHG | BODY MASS INDEX: 36.72 KG/M2 | DIASTOLIC BLOOD PRESSURE: 71 MMHG

## 2022-07-13 DIAGNOSIS — N20.0 KIDNEY STONE: ICD-10-CM

## 2022-07-13 DIAGNOSIS — N39.0 CHRONIC UTI: ICD-10-CM

## 2022-07-13 DIAGNOSIS — N20.0 NEPHROLITHIASIS: Primary | ICD-10-CM

## 2022-07-13 PROCEDURE — 74176 CT ABD & PELVIS W/O CONTRAST: CPT | Mod: 26,,, | Performed by: RADIOLOGY

## 2022-07-13 PROCEDURE — 99212 PR OFFICE/OUTPT VISIT, EST, LEVL II, 10-19 MIN: ICD-10-PCS | Mod: S$PBB,,, | Performed by: UROLOGY

## 2022-07-13 PROCEDURE — 99213 OFFICE O/P EST LOW 20 MIN: CPT | Mod: PBBFAC,25 | Performed by: UROLOGY

## 2022-07-13 PROCEDURE — 74176 CT RENAL STONE STUDY ABD PELVIS WO: ICD-10-PCS | Mod: 26,,, | Performed by: RADIOLOGY

## 2022-07-13 PROCEDURE — 74176 CT ABD & PELVIS W/O CONTRAST: CPT | Mod: TC

## 2022-07-13 PROCEDURE — 99212 OFFICE O/P EST SF 10 MIN: CPT | Mod: S$PBB,,, | Performed by: UROLOGY

## 2022-07-16 NOTE — PATIENT INSTRUCTIONS
CT reviewed with patient.  It appears that there are no residual stones present and she has passed the fragments that she had left.  Continue methenamine hippurate and I would not try to stop that for at least a year.  I will see again when needed.

## 2022-07-16 NOTE — PROGRESS NOTES
Subjective:       Patient ID: Whitley Millard is a 88 y.o. female.    Chief Complaint: Follow-up (Three month renal colic CT and office visit. )       HPI: Patient is an 88 year old female presenting to [place] [facility] as a direct admission from [facility] in [place] for acute cholecystitis. She reports that she has had some symptoms including fever, nausea, vomiting, and diarrhea for a long time, but currently is not having any of those symptoms. The cholecystitis was apparently noted at [place] when she presented with complaints of nausea. She has a pmhx of macular degeneration, HTN, GERD, and back pain. At the time of my exam, she was resting comfortably in bed in no acute distress. Vitals were all stable, although her blood pressure was a little low at 89/57 with lactated ringers being hung while I was in the room. She is afebrile, and HR is WNLs. On exam, cardiac and respiratory exam are normal. She has no abdominal distention or tenderness to deep palpation. She does have some increased redness, warmth, and pain on palpation to the right calf muscle. She states she has never had a blood clot in her legs before.      Patient will be admitted to inpatient tele to Dr. Marcano for the evaluation and treatment of acute cholecystitis. Dr. Loyd, surgery, has been made aware of patient and will plan to take her to surgery tomorrow.   -------------------------------------------------------------------------------------------------------------------------------------------------  The above note is from the history and physical of Dr. Therese Cowan from August 25.  Received verbal consult from Dr. Marcano earlier today about Ms. Millard because of new finding left renal pelvic stone.    Reviewed the CT study with Dr. Marcano and subsequently with Dr. Millard, Ms. Freeman's son.  He there is about a 13 or 14 mm stone in the left renal pelvis with mild obstruction, but I suspect that the patient is not having  sepsis related to stone.  She denies flank pain and says she has not had any flank pain previously.  Apparently not having any problems from her gallstones either as she denies any abdominal pain.    Patient has history of recurring urinary tract infections and has been on methenamine hippurate for the last few months.  Apparently has been taking her medication on a regular basis  I do not think she needs to have ureteral stent at this time.  She does need elective treatment of the left renal stone.  That can be done at a future time but she needs to continue on suppression of UTIs.  We will see what her urine culture grows on this admission.  Dr. Romo  is aware of consultation and will see if needed over weekend.  Please call me if needed.     Electronically signed by Hernando Hanson Jr., MD at 8/27/2021  6:28 PM  -----------------------------------------------------------------------------------------------------------------------------------------------------  Ms. Millard continues to feel well and has no new complaints.  She is going to need stone in the left upper tract managed but we would like to give her a few weeks to recover from her current problems.  She is asymptomatic in regards to the stone.  We will see her in about a month with a KUB. (9/7/2021)  ------------------------------------------------------------------------------------------------------------------------------------------------------------------------------------------------------------------------------------------------------------------------------------------------------  Mrs. Millard is scheduled for left ureterorenoscopy with laser lithotripsy of large stone at the level of the left renal pelvis (or upper left ureter) on Wednesday December 15. She is doing okay and is back to her baseline.  We thought she might have had a gallbladder attack when she was hospitalized but that is not certain.  She is on methenamine hippurate   for suppression of chronic UTI and has had no trouble taking that except for the size of the medication.  She has had problems with urinary control for several years.  On oxybutynin which was prescribed by Dr. Rishi Appiah.  Still has some control issues but improved while taking medication.  Stone was found incidentally when she was acutely ill but was felt she needed have this removed because of the size of the stone and likelihood that she will get septic from it.  Has history of chronic UTIs.  Feeling reasonably well today.  She is on walker but has to have that on a regular basis because of problems with her knees.  EKG while she was in the hospital revealed atrial fibrillation.  She is on no anticoagulation. (December 13, 2021)     Ms. Millard had her left ureterorenoscopy with laser lithotripsy of the left renal pelvic stone on December 15. Her stent has not bothered her but it is time for that to be removed.  She does have itching and irritation and sounds like she probably has a yeast infection.  She did go home on antibiotics but has had no recent antibiotics.  Otherwise has no complaints.  No left flank pain or other problems etc..  In today with her daughter in law.  In good spirits.  (February 15, 2022)      Ms. Millard had her left ureteral stent removed on last visit on February 15th.  She has been doing well and has not been having any pain related to stones.  Has not been aware of  passing anything else since that time.  She has had no ureteral colic.  In today for her CT scan and follow-up to see how much stone burden she has left.  Basically asymptomatic.  She is on methenamine hippurate for suppression of chronic UTIs.  (April 5, 2022)     Mrs. Millard is in for CT scan and follow-up of nephrolithiasis and recurrent urinary tract infections.  She is on the methenamine hippurate for suppression.  She is asymptomatic.  Looks like she is doing well and says she is doing well.  In with her  daughter-in-law for follow-up. (July 14, 2022)    Review of Systems      Objective:      Physical Exam  Constitutional:       Appearance: Normal appearance. She is normal weight.   Neurological:      Mental Status: She is alert.   Psychiatric:         Mood and Affect: Mood normal.         Behavior: Behavior normal.       urinalysis has several epithelial cells but only occasional pus and I did not see any bacteria.  The dipstick had 1+ leukocytes with pH of 6.0 and specific gravity 1.005  Assessment:       Problem List Items Addressed This Visit        Renal/    Nephrolithiasis - Primary      Other Visit Diagnoses     Chronic UTI              Plan:     CT reviewed with patient.  It appears that there are no residual stones present and she has passed the fragments that she had left.  Continue methenamine hippurate and I would not try to stop that for at least a year.  I will see again when needed  *

## 2023-10-04 DIAGNOSIS — N39.0 URINARY TRACT INFECTION WITHOUT HEMATURIA, SITE UNSPECIFIED: Primary | ICD-10-CM

## 2023-10-04 RX ORDER — METHENAMINE HIPPURATE 1000 MG/1
TABLET ORAL
Qty: 180 TABLET | Refills: 3 | Status: SHIPPED | OUTPATIENT
Start: 2023-10-04

## 2023-10-04 NOTE — TELEPHONE ENCOUNTER
Received notification patient needs refills on Methenamine Hippurate 1 gm to be sent to [facility] in [place].  Patient was last seen on 7/13/22, to be seen prn.

## 2024-12-15 ENCOUNTER — HOSPITAL ENCOUNTER (INPATIENT)
Facility: HOSPITAL | Age: 89
LOS: 5 days | Discharge: NURSING, REHAB OR LONG-TERM CARE | DRG: 563 | End: 2024-12-20
Attending: FAMILY MEDICINE | Admitting: STUDENT IN AN ORGANIZED HEALTH CARE EDUCATION/TRAINING PROGRAM
Payer: MEDICARE

## 2024-12-15 DIAGNOSIS — M97.8XXA PERIPROSTHETIC FRACTURE OF PROXIMAL END OF TIBIA, INITIAL ENCOUNTER: Primary | ICD-10-CM

## 2024-12-15 DIAGNOSIS — T14.90XA TRAUMA: ICD-10-CM

## 2024-12-15 DIAGNOSIS — Z96.659 PERIPROSTHETIC FRACTURE OF PROXIMAL END OF TIBIA, INITIAL ENCOUNTER: Primary | ICD-10-CM

## 2024-12-15 DIAGNOSIS — R07.9 CHEST PAIN: ICD-10-CM

## 2024-12-15 DIAGNOSIS — I82.409 DVT (DEEP VENOUS THROMBOSIS): ICD-10-CM
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
## 2024-12-15 PROBLEM — S82.409A FIBULA FRACTURE: Chronic | Status: ACTIVE | Noted: 2024-12-15

## 2024-12-15 PROBLEM — S82.409A FIBULA FRACTURE: Status: ACTIVE | Noted: 2024-12-15

## 2024-12-15 PROBLEM — S82.209A TIBIA FRACTURE: Status: RESOLVED | Noted: 2024-12-15 | Resolved: 2024-12-15

## 2024-12-15 PROBLEM — S82.209A TIBIA FRACTURE: Status: ACTIVE | Noted: 2024-12-15

## 2024-12-15 LAB
ALBUMIN SERPL BCP-MCNC: 3.5 G/DL (ref 3.4–4.8)
ALBUMIN/GLOB SERPL: 1 {RATIO}
ALP SERPL-CCNC: 123 U/L (ref 40–150)
ALT SERPL W P-5'-P-CCNC: 15 U/L
ANION GAP SERPL CALCULATED.3IONS-SCNC: 13 MMOL/L (ref 7–16)
AST SERPL W P-5'-P-CCNC: 25 U/L (ref 5–34)
BASOPHILS # BLD AUTO: 0.01 K/UL (ref 0–0.2)
BASOPHILS NFR BLD AUTO: 0.1 % (ref 0–1)
BILIRUB SERPL-MCNC: 0.5 MG/DL
BUN SERPL-MCNC: 12 MG/DL (ref 10–20)
BUN/CREAT SERPL: 12 (ref 6–20)
CALCIUM SERPL-MCNC: 9.6 MG/DL (ref 8.4–10.2)
CHLORIDE SERPL-SCNC: 104 MMOL/L (ref 98–111)
CO2 SERPL-SCNC: 25 MMOL/L (ref 23–31)
CREAT SERPL-MCNC: 0.99 MG/DL (ref 0.55–1.02)
DIFFERENTIAL METHOD BLD: ABNORMAL
EGFR (NO RACE VARIABLE) (RUSH/TITUS): 54 ML/MIN/1.73M2
EOSINOPHIL # BLD AUTO: 0.07 K/UL (ref 0–0.5)
EOSINOPHIL NFR BLD AUTO: 0.8 % (ref 1–4)
ERYTHROCYTE [DISTWIDTH] IN BLOOD BY AUTOMATED COUNT: 13.5 % (ref 11.5–14.5)
GLOBULIN SER-MCNC: 3.5 G/DL (ref 2–4)
GLUCOSE SERPL-MCNC: 124 MG/DL (ref 75–121)
HCT VFR BLD AUTO: 42.3 % (ref 38–47)
HGB BLD-MCNC: 13.6 G/DL (ref 12–16)
IMM GRANULOCYTES # BLD AUTO: 0.03 K/UL (ref 0–0.04)
IMM GRANULOCYTES NFR BLD: 0.3 % (ref 0–0.4)
LYMPHOCYTES # BLD AUTO: 0.81 K/UL (ref 1–4.8)
LYMPHOCYTES NFR BLD AUTO: 9 % (ref 27–41)
MCH RBC QN AUTO: 29.8 PG (ref 27–31)
MCHC RBC AUTO-ENTMCNC: 32.2 G/DL (ref 32–36)
MCV RBC AUTO: 92.6 FL (ref 80–96)
MONOCYTES # BLD AUTO: 0.68 K/UL (ref 0–0.8)
MONOCYTES NFR BLD AUTO: 7.5 % (ref 2–6)
MPC BLD CALC-MCNC: 10.2 FL (ref 9.4–12.4)
NEUTROPHILS # BLD AUTO: 7.43 K/UL (ref 1.8–7.7)
NEUTROPHILS NFR BLD AUTO: 82.3 % (ref 53–65)
NRBC # BLD AUTO: 0 X10E3/UL
NRBC, AUTO (.00): 0 %
PLATELET # BLD AUTO: 218 K/UL (ref 150–400)
POTASSIUM SERPL-SCNC: 4 MMOL/L (ref 3.5–5.1)
PROT SERPL-MCNC: 7 G/DL (ref 5.8–7.6)
RBC # BLD AUTO: 4.57 M/UL (ref 4.2–5.4)
SODIUM SERPL-SCNC: 138 MMOL/L (ref 136–145)
WBC # BLD AUTO: 9.03 K/UL (ref 4.5–11)

## 2024-12-15 PROCEDURE — 93010 ELECTROCARDIOGRAM REPORT: CPT | Mod: ,,, | Performed by: STUDENT IN AN ORGANIZED HEALTH CARE EDUCATION/TRAINING PROGRAM

## 2024-12-15 PROCEDURE — 80053 COMPREHEN METABOLIC PANEL: CPT | Performed by: FAMILY MEDICINE

## 2024-12-15 PROCEDURE — 63600175 PHARM REV CODE 636 W HCPCS: Performed by: STUDENT IN AN ORGANIZED HEALTH CARE EDUCATION/TRAINING PROGRAM

## 2024-12-15 PROCEDURE — 99285 EMERGENCY DEPT VISIT HI MDM: CPT | Mod: 25

## 2024-12-15 PROCEDURE — 25000003 PHARM REV CODE 250: Performed by: STUDENT IN AN ORGANIZED HEALTH CARE EDUCATION/TRAINING PROGRAM

## 2024-12-15 PROCEDURE — 11000001 HC ACUTE MED/SURG PRIVATE ROOM

## 2024-12-15 PROCEDURE — 85025 COMPLETE CBC W/AUTO DIFF WBC: CPT | Performed by: FAMILY MEDICINE

## 2024-12-15 PROCEDURE — 93005 ELECTROCARDIOGRAM TRACING: CPT

## 2024-12-15 PROCEDURE — 99223 1ST HOSP IP/OBS HIGH 75: CPT | Mod: AI,,, | Performed by: STUDENT IN AN ORGANIZED HEALTH CARE EDUCATION/TRAINING PROGRAM

## 2024-12-15 PROCEDURE — 87040 BLOOD CULTURE FOR BACTERIA: CPT | Performed by: FAMILY MEDICINE

## 2024-12-15 RX ORDER — FUROSEMIDE 20 MG/1
20 TABLET ORAL EVERY OTHER DAY
Status: DISCONTINUED | OUTPATIENT
Start: 2024-12-16 | End: 2024-12-20 | Stop reason: HOSPADM

## 2024-12-15 RX ORDER — IBUPROFEN 200 MG
24 TABLET ORAL
Status: DISCONTINUED | OUTPATIENT
Start: 2024-12-15 | End: 2024-12-20 | Stop reason: HOSPADM

## 2024-12-15 RX ORDER — METHENAMINE HIPPURATE 1000 MG/1
1 TABLET ORAL 2 TIMES DAILY
Status: DISCONTINUED | OUTPATIENT
Start: 2024-12-15 | End: 2024-12-20 | Stop reason: HOSPADM

## 2024-12-15 RX ORDER — IBUPROFEN 200 MG
16 TABLET ORAL
Status: DISCONTINUED | OUTPATIENT
Start: 2024-12-15 | End: 2024-12-20 | Stop reason: HOSPADM

## 2024-12-15 RX ORDER — POLYETHYLENE GLYCOL 3350 17 G/17G
17 POWDER, FOR SOLUTION ORAL 2 TIMES DAILY PRN
Status: DISCONTINUED | OUTPATIENT
Start: 2024-12-15 | End: 2024-12-20 | Stop reason: HOSPADM

## 2024-12-15 RX ORDER — NALOXONE HCL 0.4 MG/ML
0.02 VIAL (ML) INJECTION
Status: DISCONTINUED | OUTPATIENT
Start: 2024-12-15 | End: 2024-12-20 | Stop reason: HOSPADM

## 2024-12-15 RX ORDER — ENOXAPARIN SODIUM 100 MG/ML
40 INJECTION SUBCUTANEOUS EVERY 24 HOURS
Status: DISCONTINUED | OUTPATIENT
Start: 2024-12-15 | End: 2024-12-20 | Stop reason: HOSPADM

## 2024-12-15 RX ORDER — CETIRIZINE HYDROCHLORIDE 10 MG/1
10 TABLET ORAL DAILY PRN
Status: DISCONTINUED | OUTPATIENT
Start: 2024-12-15 | End: 2024-12-20 | Stop reason: HOSPADM

## 2024-12-15 RX ORDER — PANTOPRAZOLE SODIUM 40 MG/1
40 TABLET, DELAYED RELEASE ORAL DAILY
Status: DISCONTINUED | OUTPATIENT
Start: 2024-12-16 | End: 2024-12-20 | Stop reason: HOSPADM

## 2024-12-15 RX ORDER — SODIUM CHLORIDE 9 MG/ML
INJECTION, SOLUTION INTRAVENOUS CONTINUOUS
Status: DISPENSED | OUTPATIENT
Start: 2024-12-15 | End: 2024-12-16

## 2024-12-15 RX ORDER — GLUCAGON 1 MG
1 KIT INJECTION
Status: DISCONTINUED | OUTPATIENT
Start: 2024-12-15 | End: 2024-12-20 | Stop reason: HOSPADM

## 2024-12-15 RX ORDER — ONDANSETRON 4 MG/1
8 TABLET, ORALLY DISINTEGRATING ORAL EVERY 8 HOURS PRN
Status: DISCONTINUED | OUTPATIENT
Start: 2024-12-15 | End: 2024-12-20 | Stop reason: HOSPADM

## 2024-12-15 RX ORDER — MORPHINE SULFATE 4 MG/ML
4 INJECTION, SOLUTION INTRAMUSCULAR; INTRAVENOUS EVERY 4 HOURS PRN
Status: DISCONTINUED | OUTPATIENT
Start: 2024-12-15 | End: 2024-12-20 | Stop reason: HOSPADM

## 2024-12-15 RX ORDER — SODIUM CHLORIDE 0.9 % (FLUSH) 0.9 %
10 SYRINGE (ML) INJECTION EVERY 12 HOURS PRN
Status: DISCONTINUED | OUTPATIENT
Start: 2024-12-15 | End: 2024-12-20 | Stop reason: HOSPADM

## 2024-12-15 RX ORDER — OXYBUTYNIN CHLORIDE 5 MG/1
15 TABLET, EXTENDED RELEASE ORAL DAILY
Status: DISCONTINUED | OUTPATIENT
Start: 2024-12-16 | End: 2024-12-20 | Stop reason: HOSPADM

## 2024-12-15 RX ORDER — MORPHINE SULFATE 2 MG/ML
2 INJECTION, SOLUTION INTRAMUSCULAR; INTRAVENOUS EVERY 4 HOURS PRN
Status: DISCONTINUED | OUTPATIENT
Start: 2024-12-15 | End: 2024-12-20 | Stop reason: HOSPADM

## 2024-12-15 RX ORDER — VERAPAMIL HYDROCHLORIDE 180 MG/1
180 TABLET, EXTENDED RELEASE ORAL DAILY
Status: DISCONTINUED | OUTPATIENT
Start: 2024-12-16 | End: 2024-12-20 | Stop reason: HOSPADM

## 2024-12-15 RX ORDER — OXYCODONE HYDROCHLORIDE 5 MG/1
5 TABLET ORAL EVERY 6 HOURS PRN
Status: DISCONTINUED | OUTPATIENT
Start: 2024-12-15 | End: 2024-12-20 | Stop reason: HOSPADM

## 2024-12-15 RX ADMIN — ENOXAPARIN SODIUM 40 MG: 40 INJECTION SUBCUTANEOUS at 07:12

## 2024-12-15 RX ADMIN — MORPHINE SULFATE 2 MG: 2 INJECTION, SOLUTION INTRAMUSCULAR; INTRAVENOUS at 07:12

## 2024-12-15 RX ADMIN — TRAZODONE HYDROCHLORIDE 25 MG: 50 TABLET ORAL at 08:12

## 2024-12-15 RX ADMIN — METHENAMINE HIPPURATE 1 G: 1 TABLET ORAL at 08:12

## 2024-12-15 RX ADMIN — MORPHINE SULFATE 4 MG: 4 INJECTION, SOLUTION INTRAMUSCULAR; INTRAVENOUS at 10:12

## 2024-12-15 RX ADMIN — SODIUM CHLORIDE: 9 INJECTION, SOLUTION INTRAVENOUS at 07:12

## 2024-12-15 NOTE — ED NOTES
Pt placed on hospital bed with clean linen and incontinence pads placed. Waffle overlay applied to bed and external catheter placed for incontinence

## 2024-12-15 NOTE — ED PROVIDER NOTES
"Encounter Date: 12/15/2024    SCRIBE #1 NOTE: I, Izabel Nix, am scribing for, and in the presence of,  Dae Nunn DO. I have scribed the entire note.       History     Chief Complaint   Patient presents with    Fall     Pt reports falling this AM. She reports left ankle and hip pain      This is a 89 y/o female,who presents to the ED with complaints of left ankle pain which occurred after a fall this morning. She states her scooter "got away from her" and she fell. She did not hit her head and there was no LOC. She is not on blood thinners and she last ate yesterday. There are no other complaints/pain in the ED at this time. She has a known hx of GERD, UTI(s), HTN, and kidney stones.     The history is provided by the patient and the EMS personnel. No  was used.     Review of patient's allergies indicates:   Allergen Reactions    Codeine      Past Medical History:   Diagnosis Date    Cholelithiases     Essential hypertension 08/26/2021    GERD (gastroesophageal reflux disease)     Macular degeneration     Osteoarthrosis     Pulmonary hypertension 2021    RVSP  37 mmHg    Ureteral stone with hydronephrosis      Past Surgical History:   Procedure Laterality Date    CARPAL TUNNEL RELEASE Bilateral     FOOT SURGERY Left     TOTAL HIP ARTHROPLASTY Right     TOTAL KNEE ARTHROPLASTY Left     URETERORENOSCOPY Left 12/15/2021    Procedure: URETERORENOSCOPY WITH LASER LITHOTRIPSY AND INSERTION OF URETERAL STENT;  Surgeon: Hernando Hanson Jr., MD;  Location: [facility];  Service: Urology;  Laterality: Left;     Family History   Problem Relation Name Age of Onset    Stroke Father      Heart disease Sister      Cancer Son       Social History     Tobacco Use    Smoking status: Never    Smokeless tobacco: Never   Substance Use Topics    Alcohol use: Never    Drug use: Never     Review of Systems   Constitutional:         Fall.    Musculoskeletal:         Left ankle pain.      Neurological:  " Positive for syncope.   All other systems reviewed and are negative.      Physical Exam     Initial Vitals [12/15/24 1117]   BP Pulse Resp Temp SpO2   132/63 81 14 98.5 °F (36.9 °C) 100 %      MAP       --         Physical Exam    Nursing note and vitals reviewed.  Constitutional: She appears well-developed and well-nourished.   HENT:   Head: Normocephalic and atraumatic.   Eyes: Conjunctivae and EOM are normal. Pupils are equal, round, and reactive to light.   Neck: Neck supple.   Normal range of motion.  Cardiovascular:  Normal rate, regular rhythm, normal heart sounds and intact distal pulses.           Pulmonary/Chest: Breath sounds normal.   Abdominal: Abdomen is soft. Bowel sounds are normal.   Musculoskeletal:         General: Normal range of motion.      Cervical back: Normal range of motion and neck supple.     Neurological: She is alert and oriented to person, place, and time. She has normal strength.   Skin: Skin is warm and dry. Capillary refill takes less than 2 seconds.   There is cellulitis noted to the LLE.   There LLE is externally rotated, swollen, red and tendern.      Psychiatric: She has a normal mood and affect. Thought content normal.         ED Course   Procedures  Labs Reviewed   COMPREHENSIVE METABOLIC PANEL - Abnormal       Result Value    Sodium 138      Potassium 4.0      Chloride 104      CO2 25      Anion Gap 13      Glucose 124 (*)     BUN 12      Creatinine 0.99      BUN/Creatinine Ratio 12      Calcium 9.6      Total Protein 7.0      Albumin 3.5      Globulin 3.5      A/G Ratio 1.0      Bilirubin, Total 0.5      Alk Phos 123      ALT 15      AST 25      eGFR 54 (*)    CBC WITH DIFFERENTIAL - Abnormal    WBC 9.03      RBC 4.57      Hemoglobin 13.6      Hematocrit 42.3      MCV 92.6      MCH 29.8      MCHC 32.2      RDW 13.5      Platelet Count 218      MPV 10.2      Neutrophils % 82.3 (*)     Lymphocytes % 9.0 (*)     Monocytes % 7.5 (*)     Eosinophils % 0.8 (*)     Basophils % 0.1       Immature Granulocytes % 0.3      nRBC, Auto 0.0      Neutrophils, Abs 7.43      Lymphocytes, Absolute 0.81 (*)     Monocytes, Absolute 0.68      Eosinophils, Absolute 0.07      Basophils, Absolute 0.01      Immature Granulocytes, Absolute 0.03      nRBC, Absolute 0.00      Diff Type Auto     CBC W/ AUTO DIFFERENTIAL    Narrative:     The following orders were created for panel order CBC Auto Differential.  Procedure                               Abnormality         Status                     ---------                               -----------         ------                     CBC with Differential[[id number]]       Abnormal            Final result                 Please view results for these tests on the individual orders.          Imaging Results              US Lower Extremity Veins Left (Final result)  Result time 12/15/24 14:49:20      Final result by Otoniel Reilly MD (12/15/24 14:49:20)                   Impression:      No evidence of deep venous thrombosis in the left lower extremity.      Electronically signed by: Otoniel Reilly MD  Date:    12/15/2024  Time:    14:49               Narrative:    EXAMINATION:  US LOWER EXTREMITY VEINS LEFT    CLINICAL HISTORY:  Acute embolism and thrombosis of unspecified deep veins of unspecified lower extremity    TECHNIQUE:  Duplex and color flow Doppler evaluation and graded compression of the left lower extremity veins was performed.    COMPARISON:  Left lower extremity venous Doppler ultrasound 08/30/2021    FINDINGS:  Left thigh veins: The common femoral, femoral, popliteal, upper greater saphenous, and deep femoral veins are patent and free of thrombus. The veins are normally compressible and have normal phasic flow and augmentation response.    Left calf veins: The visualized calf veins are patent.    Miscellaneous: None                                        X-Ray Knee AP LAT with Sunrise Left (Final result)  Result time 12/15/24 13:07:57   Procedure changed  from X-Ray Knee 3 View Left     Final result by Earl Riojas MD (12/15/24 13:07:57)                   Impression:      Recent appearing comminuted fracture of the proximal 3rd shaft of the fibular diaphysis.    Additionally, on the frontal view, there is newly evident obliquely oriented lucency through the proximal tibial shaft inferior to the tibial component of the hardware, not clearly evident on remote radiograph of 01/18/2017, and concerning for recent periprosthetic fracture.    This report was flagged in Epic as abnormal.      Electronically signed by: Earl Riojas  Date:    12/15/2024  Time:    13:07               Narrative:    EXAMINATION:  XR KNEE AP LAT WITH SUNRISE LEFT    CLINICAL HISTORY:  fall;  Injury, unspecified, initial encounter    TECHNIQUE:  Three views left knee.    COMPARISON:  Left knee radiograph performed 01/18/2017.    FINDINGS:  Redemonstrated remote operative sequela of total knee arthroplasty.  No definite malalignment of the hardware.    Recent appearing comminuted fracture of the proximal 3rd shaft of the fibular diaphysis.    Additionally, on the frontal view, there is newly evident obliquely oriented lucency through the proximal tibial shaft inferior to the tibial component of the hardware, not clearly evident on remote radiograph of 01/18/2017, and concerning for recent periprosthetic fracture.    Vascular calcifications are seen.  No definite radiopaque foreign body.    Reticulation of subcu fat planes may relate to edema, noting that cellulitis could appear similar.                                       X-Ray Ankle Complete Left (Final result)  Result time 12/15/24 12:16:38      Final result by Otoniel Reilly MD (12/15/24 12:16:38)                   Impression:      Distal left lower leg, ankle and foot diffuse nonspecific soft tissue swelling from edema or cellulitis.    No osseous fracture-dislocation identified.    Other incidental/chronic appearing findings in the  body of the report.      Electronically signed by: Otoniel Reilly MD  Date:    12/15/2024  Time:    12:16               Narrative:    EXAMINATION:  XR ANKLE COMPLETE 3 VIEW LEFT; XR FOOT COMPLETE 3 VIEW LEFT    CLINICAL HISTORY:  Injury, unspecified, initial encounter    TECHNIQUE:  AP, lateral and oblique views of the left ankle and left foot were performed.    COMPARISON:  None    FINDINGS:  Generalized osteopenia.  Marked hallux valgus configuration with chronic appearing mild lateral subluxation of the great toe with respect to the 1st MTP joint which is likely degenerative related.  No displaced fracture, dislocation or destructive osseous process.  Lisfranc articulation is congruent.  Moderate to advanced degenerative change at the dorsal also 1st MTP joint.  Minimal to mild DJD elsewhere.  Diffuse soft tissue prominence with subcutaneous stranding throughout the imaged left lower leg, ankle and foot suggesting swelling from edema or cellulitis.  Scattered atherosclerotic vascular calcifications as well as nonspecific soft tissue calcifications noted.  No subcutaneous emphysema or radiopaque foreign body.                                       X-Ray Foot Complete Left (Final result)  Result time 12/15/24 12:16:38      Final result by Otoniel Reilly MD (12/15/24 12:16:38)                   Impression:      Distal left lower leg, ankle and foot diffuse nonspecific soft tissue swelling from edema or cellulitis.    No osseous fracture-dislocation identified.    Other incidental/chronic appearing findings in the body of the report.      Electronically signed by: Otoniel Reilly MD  Date:    12/15/2024  Time:    12:16               Narrative:    EXAMINATION:  XR ANKLE COMPLETE 3 VIEW LEFT; XR FOOT COMPLETE 3 VIEW LEFT    CLINICAL HISTORY:  Injury, unspecified, initial encounter    TECHNIQUE:  AP, lateral and oblique views of the left ankle and left foot were performed.    COMPARISON:  None    FINDINGS:  Generalized  osteopenia.  Marked hallux valgus configuration with chronic appearing mild lateral subluxation of the great toe with respect to the 1st MTP joint which is likely degenerative related.  No displaced fracture, dislocation or destructive osseous process.  Lisfranc articulation is congruent.  Moderate to advanced degenerative change at the dorsal also 1st MTP joint.  Minimal to mild DJD elsewhere.  Diffuse soft tissue prominence with subcutaneous stranding throughout the imaged left lower leg, ankle and foot suggesting swelling from edema or cellulitis.  Scattered atherosclerotic vascular calcifications as well as nonspecific soft tissue calcifications noted.  No subcutaneous emphysema or radiopaque foreign body.                                       X-Ray Hip 2 or 3 views Left with Pelvis when performed (Final result)  Result time 12/15/24 12:11:07      Final result by Claudine Owen MD (12/15/24 12:11:07)                   Impression:      There is diffuse osteopenia present.  Stable irregularity is noted along the greater trochanter of the left hip but there is no fracture, acute noted involving the left hip and no dislocation.  An old fragment osteophyte is noted along the superior aspect of the greater trochanteric region.  Please note that given the osteoporotic bones, subtle fractures can be difficult to visualize on plain film and CT follow-up imaging may be necessary if clinical suspicion remains.    A right hip prosthesis remains present and well seated.  Degenerative changes of the spine are noted on the edge of the film.  Sacrum is not well seen due to the overlying bowel gas and stool.      Electronically signed by: Claudine Owen  Date:    12/15/2024  Time:    12:11               Narrative:    EXAMINATION:  XR HIP WITH PELVIS WHEN PERFORMED 2 OR 3 VIEWS LEFT    CLINICAL HISTORY:  Injury, unspecified, initial encounter    TECHNIQUE:  AP view of the pelvis and frog leg lateral view of the left  hip were performed.    COMPARISON:  Prior study dated 01/18/2017.    FINDINGS:  Three views are available iincluding an AP pelvis and AP and frogleg  lateral view of the left hip                                       X-Ray Chest AP Portable (Final result)  Result time 12/15/24 11:59:26      Final result by Earl Riojas MD (12/15/24 11:59:26)                   Impression:      Suspected displaced fractures of the left lateral 5th and 6th ribs, new when compared to remote radiograph of 08/27/2021.    No definite pneumothorax.    No convincing evidence of acute cardiopulmonary disease.      Electronically signed by: Earl Riojas  Date:    12/15/2024  Time:    11:59               Narrative:    EXAMINATION:  XR CHEST AP PORTABLE    CLINICAL HISTORY:  trauma;    TECHNIQUE:  Single frontal view of the chest was performed.    COMPARISON:  Chest radiograph performed 08/27/2021, 17:34 hours.    FINDINGS:  Monitoring leads over the chest.  Grossly unchanged cardiomediastinal contours.  Chronic interstitial coarsening is again noted.  No definite acute appearing focal airspace consolidation.  Chronic blunting the left costophrenic angle, as seen previously.  Suspected hiatal hernia.    No definite pneumothorax    No acute findings in the visualized abdomen    Soft tissue structures without definite acute change.    Suspected displaced fractures of the left lateral 5th and 6th ribs, new when compared to remote radiograph of 08/27/2021.                                       Medications   cetirizine tablet 10 mg (has no administration in time range)   pantoprazole EC tablet 40 mg (40 mg Oral Given 12/18/24 0902)   furosemide tablet 20 mg (20 mg Oral Given 12/18/24 0902)   methenamine tablet 1 g (1 g Oral Given 12/18/24 2109)   oxybutynin 24 hr tablet 15 mg (15 mg Oral Given 12/18/24 0902)   verapamiL CR tablet 180 mg (180 mg Oral Given 12/18/24 0902)   sodium chloride 0.9% flush 10 mL (has no administration in time range)    naloxone 0.4 mg/mL injection 0.02 mg (has no administration in time range)   glucose chewable tablet 16 g (has no administration in time range)   glucose chewable tablet 24 g (has no administration in time range)   dextrose 50% injection 12.5 g (has no administration in time range)   dextrose 50% injection 25 g (has no administration in time range)   glucagon (human recombinant) injection 1 mg (has no administration in time range)   enoxaparin injection 40 mg (40 mg Subcutaneous Given 12/18/24 1700)   0.9% NaCl infusion ( Intravenous New Bag 12/15/24 1919)   morphine injection 2 mg (2 mg Intravenous Given 12/18/24 1001)   morphine injection 4 mg (4 mg Intravenous Given 12/15/24 2215)   oxyCODONE immediate release tablet 5 mg (5 mg Oral Given 12/18/24 1740)   polyethylene glycol packet 17 g (has no administration in time range)   ondansetron disintegrating tablet 8 mg (has no administration in time range)   trazodone split tablet 25 mg (25 mg Oral Given 12/15/24 2055)   labetaloL injection 20 mg (20 mg Intravenous Not Given 12/16/24 1249)   ibuprofen tablet 400 mg (has no administration in time range)   tuberculin injection 5 Units (5 Units Intradermal Given 12/19/24 0007)     Medical Decision Making            Attending Attestation:           Physician Attestation for Scribe:  Physician Attestation Statement for Scribe #1: I, Ophelia Nunn, DO, reviewed documentation, as scribed by Izabel Nix in my presence, and it is both accurate and complete.             ED Course as of 12/19/24 0628   Sun Dec 15, 2024   1239 X-ray Foot Complete Left:   Generalized osteopenia.  Marked hallux valgus configuration with chronic appearing mild lateral subluxation of the great toe with respect to the 1st MTP joint which is likely degenerative related.  No displaced fracture, dislocation or destructive osseous process.  Lisfranc articulation is congruent.  Moderate to advanced degenerative change at the dorsal also 1st  "MTP joint.  Minimal to mild DJD elsewhere.  Diffuse soft tissue prominence with subcutaneous stranding throughout the imaged left lower leg, ankle and foot suggesting swelling from edema or cellulitis.  Scattered atherosclerotic vascular calcifications as well as nonspecific soft tissue calcifications noted.  No subcutaneous emphysema or radiopaque foreign body.     Impression:     Distal left lower leg, ankle and foot diffuse nonspecific soft tissue swelling from edema or cellulitis.     No osseous fracture-dislocation identified.   [BW]   1241 X-Ray Hip 2 or 3 views Left with Pelvis when performed [BW]   1242 X-ray Hip 2 or 3 views left with pelvis when performed:   There is diffuse osteopenia present.  Stable irregularity is noted along the greater trochanter of the left hip but there is no fracture, acute noted involving the left hip and no dislocation.  An old fragment osteophyte is noted along the superior aspect of the greater trochanteric region.  Please note that given the osteoporotic bones, subtle fractures can be difficult to visualize on plain film and CT follow-up imaging may be necessary if clinical suspicion remains.     A right hip prosthesis remains present and well seated.  Degenerative changes of the spine are noted on the edge of the film.  Sacrum is not well seen due to the overlying bowel gas and stool.     [BW]   1242 X-Ray Chest AP Portable [BW]   1243 X-ray chest AP portable:   Suspected displaced fractures of the left lateral 5th and 6th ribs, new when compared to remote radiograph of 08/27/2021.     No definite pneumothorax.     No convincing evidence of acute cardiopulmonary disease.   [BW]      ED Course User Index  [BW] Izabel Nix               Medical Decision Making:   Initial Assessment:   This is a 89 y/o female,who presents to the ED with complaints of left ankle pain which occurred after a fall this morning. She states her scooter "got away from her" and she fell. She did " not hit her head and there was no LOC. She is not on blood thinners and she last ate yesterday. There are no other complaints/pain in the ED at this time. She has a known hx of GERD, UTI(s), HTN, and kidney stones.     The history is provided by the patient and the EMS personnel. No  was used.     Differential Diagnosis:   Patient in with a proximal fibula fracture.  ED Management:  Discussed the case with Dr. Dela Cruz and  dr Millard who is the patient's son--patient should be admitted with a knee immobilizer on in Dr. Nielsen to evaluate her tomorrow morning             Clinical Impression:  Final diagnoses:  [T14.90XA] Trauma  [I82.409] DVT (deep venous thrombosis)          ED Disposition Condition    Admit                 Dae Nunn,   12/19/24 0628

## 2024-12-16 PROCEDURE — 63600175 PHARM REV CODE 636 W HCPCS: Performed by: STUDENT IN AN ORGANIZED HEALTH CARE EDUCATION/TRAINING PROGRAM

## 2024-12-16 PROCEDURE — 99232 SBSQ HOSP IP/OBS MODERATE 35: CPT | Mod: ,,, | Performed by: STUDENT IN AN ORGANIZED HEALTH CARE EDUCATION/TRAINING PROGRAM

## 2024-12-16 PROCEDURE — 11000001 HC ACUTE MED/SURG PRIVATE ROOM

## 2024-12-16 PROCEDURE — 25000003 PHARM REV CODE 250: Performed by: STUDENT IN AN ORGANIZED HEALTH CARE EDUCATION/TRAINING PROGRAM

## 2024-12-16 PROCEDURE — 97162 PT EVAL MOD COMPLEX 30 MIN: CPT

## 2024-12-16 PROCEDURE — 97165 OT EVAL LOW COMPLEX 30 MIN: CPT

## 2024-12-16 RX ORDER — LABETALOL HYDROCHLORIDE 5 MG/ML
20 INJECTION, SOLUTION INTRAVENOUS EVERY 6 HOURS PRN
Status: DISCONTINUED | OUTPATIENT
Start: 2024-12-16 | End: 2024-12-20 | Stop reason: HOSPADM

## 2024-12-16 RX ADMIN — OXYCODONE 5 MG: 5 TABLET ORAL at 05:12

## 2024-12-16 RX ADMIN — OXYCODONE 5 MG: 5 TABLET ORAL at 11:12

## 2024-12-16 RX ADMIN — OXYBUTYNIN CHLORIDE 15 MG: 5 TABLET, EXTENDED RELEASE ORAL at 08:12

## 2024-12-16 RX ADMIN — ENOXAPARIN SODIUM 40 MG: 40 INJECTION SUBCUTANEOUS at 04:12

## 2024-12-16 RX ADMIN — PANTOPRAZOLE SODIUM 40 MG: 40 TABLET, DELAYED RELEASE ORAL at 08:12

## 2024-12-16 RX ADMIN — FUROSEMIDE 20 MG: 20 TABLET ORAL at 08:12

## 2024-12-16 RX ADMIN — VERAPAMIL HYDROCHLORIDE 180 MG: 180 TABLET ORAL at 12:12

## 2024-12-16 RX ADMIN — METHENAMINE HIPPURATE 1 G: 1 TABLET ORAL at 08:12

## 2024-12-16 RX ADMIN — MORPHINE SULFATE 2 MG: 2 INJECTION, SOLUTION INTRAMUSCULAR; INTRAVENOUS at 08:12

## 2024-12-16 NOTE — PT/OT/SLP EVAL
Occupational Therapy   Evaluation    Name: Whitley Millard  MRN: [id number]  Admitting Diagnosis: Periprosthetic fracture of proximal end of tibia  Recent Surgery: * No surgery found *      Recommendations:     Discharge Recommendations: Moderate Intensity Therapy  Discharge Equipment Recommendations:  to be determined by next level of care  Barriers to discharge:  None    Assessment:     Whitley Millard is a 89 y.o. female with a medical diagnosis of Periprosthetic fracture of proximal end of tibia.  She presents with weakness and decline in ADLs. Performance deficits affecting function: weakness, impaired endurance, impaired self care skills, impaired functional mobility, gait instability, impaired balance, pain, orthopedic precautions.      Rehab Prognosis: Good; patient would benefit from acute skilled OT services to address these deficits and reach maximum level of function.       Plan:     Patient to be seen 5 x/week to address the above listed problems via self-care/home management, therapeutic activities, therapeutic exercises  Plan of Care Expires: 01/13/25  Plan of Care Reviewed with: patient    Subjective     Chief Complaint: periprosthetic fracture of proximal end of tibia  Patient/Family Comments/goals: pt agreeable to OT teresa    Occupational Profile:  Living Environment: pt lives alone in one story home with one step into kitchen  Previous level of function: independent with ADL tasks and utilized rollator for functional mobility   Roles and Routines: perform self care  Equipment Used at Home: rollator  Assistance upon Discharge: swing bed    Pain/Comfort:  Pain Rating 1: 0/10 (at rest)  Location - Side 1: Left  Location 1: leg  Pain Addressed 1: Pre-medicate for activity    Patients cultural, spiritual, Uatsdin conflicts given the current situation: no    Objective:     Communicated with: MARTIN Cordon prior to session.  Patient found HOB elevated with knee immobilizer, peripheral IV, PureWick upon OT  entry to room.    General Precautions: Standard, fall  Orthopedic Precautions: LLE non weight bearing  Braces: Knee immobilizer  Respiratory Status: Room air    Occupational Performance:    Bed Mobility:    Patient completed Supine to Sit with maximal assistance  Patient completed Sit to Supine with maximal assistance    Functional Mobility/Transfers:  Patient completed Sit <> Stand Transfer with maximal assistance  with  hand-held assist   Functional Mobility: unable    Activities of Daily Living:  Lower Body Dressing: maximal assistance to tito socks    Cognitive/Visual Perceptual:  Cognitive/Psychosocial Skills:     -       Oriented to: Person, Place, Time, and Situation   -       Follows Commands/attention:Follows multistep  commands  -       Mood/Affect/Coping skills/emotional control: Cooperative    Physical Exam:  Balance:    -       sitting balance CGA  Upper Extremity Range of Motion:     -       Right Upper Extremity: WFL  -       Left Upper Extremity: WFL  Upper Extremity Strength:    -       Right Upper Extremity: 4/5  -       Left Upper Extremity: 4/5  Gross motor coordination:   WFL    AMPAC 6 Click ADL:  AMPAC Total Score: 15    Treatment & Education:  Pt educated on OT role/POC.   Importance of OOB activity with staff assistance.  Importance of sitting up in the chair throughout the day as tolerated, especially for meals   Safety during functional t/f and mobility with use of RW  Importance of assisting with self-care activities   All questions/concerns answered within OT scope of practice      Patient left HOB elevated with all lines intact, call button in reach, bed alarm on, and family present    GOALS:   Multidisciplinary Problems       Occupational Therapy Goals          Problem: Occupational Therapy    Goal Priority Disciplines Outcome Interventions   Occupational Therapy Goal     OT, PT/OT Progressing    Description: ST.Pt will perform bathing with ModA with setup at EOB  2.Pt will  perform UE dressing with Flor  3.Pt will perform LE dressing with ModA  4.Pt will transfer bed/chair/bsc with ModA with RW  5.Pt will perform standing task x 1 min with ModA with RW  6.Tolerate 15 min of tx without fatigue.      LTG:   Restore to max I with selfcare and mobility.                           History:     Past Medical History:   Diagnosis Date    Cholelithiases     Essential hypertension 08/26/2021    GERD (gastroesophageal reflux disease)     Macular degeneration     Osteoarthrosis     Pulmonary hypertension 2021    RVSP  37 mmHg    Ureteral stone with hydronephrosis          Past Surgical History:   Procedure Laterality Date    CARPAL TUNNEL RELEASE Bilateral     FOOT SURGERY Left     TOTAL HIP ARTHROPLASTY Right     TOTAL KNEE ARTHROPLASTY Left     URETERORENOSCOPY Left 12/15/2021    Procedure: URETERORENOSCOPY WITH LASER LITHOTRIPSY AND INSERTION OF URETERAL STENT;  Surgeon: Hernando Hanson Jr., MD;  Location: [facility];  Service: Urology;  Laterality: Left;       Time Tracking:     OT Date of Treatment: 12/16/24  OT Start Time: 1417  OT Stop Time: 1430  OT Total Time (min): 13 min    Billable Minutes:Evaluation OT min complexity eval    12/16/2024

## 2024-12-16 NOTE — SUBJECTIVE & OBJECTIVE
Past Medical History:   Diagnosis Date    Cholelithiases     Essential hypertension 08/26/2021    GERD (gastroesophageal reflux disease)     Macular degeneration     Osteoarthrosis     Pulmonary hypertension 2021    RVSP  37 mmHg    Ureteral stone with hydronephrosis        Past Surgical History:   Procedure Laterality Date    CARPAL TUNNEL RELEASE Bilateral     FOOT SURGERY Left     TOTAL HIP ARTHROPLASTY Right     TOTAL KNEE ARTHROPLASTY Left     URETERORENOSCOPY Left 12/15/2021    Procedure: URETERORENOSCOPY WITH LASER LITHOTRIPSY AND INSERTION OF URETERAL STENT;  Surgeon: Hernando Hanson Jr., MD;  Location: [facility];  Service: Urology;  Laterality: Left;       Review of patient's allergies indicates:   Allergen Reactions    Codeine        No current facility-administered medications on file prior to encounter.     Current Outpatient Medications on File Prior to Encounter   Medication Sig    cetirizine (ZYRTEC) 10 MG tablet Take 10 mg by mouth daily as needed.     esomeprazole (NEXIUM) 40 MG capsule TAKE 1 CAPSULE BY MOUTH EVERY DAY BEFORE BREAKFAST    furosemide (LASIX) 20 MG tablet Take 20 mg by mouth every other day.    methenamine (HIPREX) 1 gram Tab TAKE 1 TABLET BY MOUTH TWICE DAILY    multivitamin (THERAGRAN) per tablet Take 1 tablet by mouth once daily.    oxybutynin (DITROPAN XL) 15 MG TR24 Take 15 mg by mouth once daily.    verapamiL (CALAN-SR) 180 MG CR tablet Take 180 mg by mouth once daily.    [DISCONTINUED] sulfamethoxazole-trimethoprim 800-160mg (BACTRIM DS) 800-160 mg Tab Take 1 tablet by mouth 2 (two) times daily. Take with probiotics.  Discontinue methenamine hippurate while taking this drug and restart when completed (Patient not taking: Reported on 4/5/2022)    [DISCONTINUED] verapamiL (VERELAN) 180 MG C24P Take 180 mg by mouth once daily.     Family History       Problem Relation (Age of Onset)    Cancer Son    Heart disease Sister    Stroke Father          Tobacco Use    Smoking status:  Never    Smokeless tobacco: Never   Substance and Sexual Activity    Alcohol use: Never    Drug use: Never    Sexual activity: Not Currently     Review of Systems   Constitutional:  Negative for chills, fatigue, fever and unexpected weight change.   HENT:  Negative for congestion, mouth sores and sore throat.    Eyes:  Negative for photophobia and visual disturbance.   Respiratory:  Negative for cough, chest tightness, shortness of breath and wheezing.    Cardiovascular:  Negative for chest pain, palpitations and leg swelling.   Gastrointestinal:  Negative for abdominal pain, diarrhea, nausea and vomiting.   Endocrine: Negative for cold intolerance and heat intolerance.   Genitourinary:  Negative for difficulty urinating, dysuria, frequency and urgency.   Musculoskeletal:  Positive for arthralgias and gait problem. Negative for back pain and myalgias.   Skin:  Negative for pallor and rash.   Neurological:  Negative for tremors, seizures, syncope, weakness, numbness and headaches.   Hematological:  Does not bruise/bleed easily.   Psychiatric/Behavioral:  Negative for agitation, confusion, hallucinations and suicidal ideas.      Objective:     Vital Signs (Most Recent):  Temp: 98.5 °F (36.9 °C) (12/15/24 1117)  Pulse: 107 (12/15/24 1800)  Resp: 19 (12/15/24 1800)  BP: 139/85 (12/15/24 1801)  SpO2: 97 % (12/15/24 1759) Vital Signs (24h Range):  Temp:  [98.5 °F (36.9 °C)] 98.5 °F (36.9 °C)  Pulse:  [] 107  Resp:  [11-21] 19  SpO2:  [95 %-100 %] 97 %  BP: (101-164)/(54-85) 139/85     Weight: 85.3 kg (188 lb)  Body mass index is 33.3 kg/m².     Physical Exam  Vitals reviewed.   Constitutional:       Appearance: Normal appearance.   HENT:      Head: Normocephalic and atraumatic.      Nose: Nose normal.   Eyes:      Conjunctiva/sclera: Conjunctivae normal.   Neck:      Trachea: Trachea normal.   Cardiovascular:      Rate and Rhythm: Regular rhythm. Tachycardia present.      Pulses: Normal pulses.      Heart sounds:  Normal heart sounds.   Pulmonary:      Effort: Pulmonary effort is normal.      Breath sounds: Normal breath sounds and air entry.   Abdominal:      General: Bowel sounds are normal.      Palpations: Abdomen is soft.   Musculoskeletal:      Cervical back: Neck supple.      Comments: Tenderness in the left lower extremity   Skin:     General: Skin is warm and dry.   Neurological:      Mental Status: She is alert. Mental status is at baseline.      Comments: Grossly normal motor and sensory function without focal deficit appreciated.   Psychiatric:         Mood and Affect: Mood and affect normal.         Behavior: Behavior is cooperative.                Significant Labs: All pertinent labs within the past 24 hours have been reviewed.    Significant Imaging: I have reviewed all pertinent imaging results/findings within the past 24 hours.

## 2024-12-16 NOTE — SUBJECTIVE & OBJECTIVE
Past Medical History:   Diagnosis Date    Cholelithiases     Essential hypertension 08/26/2021    GERD (gastroesophageal reflux disease)     Macular degeneration     Osteoarthrosis     Pulmonary hypertension 2021    RVSP  37 mmHg    Ureteral stone with hydronephrosis        Past Surgical History:   Procedure Laterality Date    CARPAL TUNNEL RELEASE Bilateral     FOOT SURGERY Left     TOTAL HIP ARTHROPLASTY Right     TOTAL KNEE ARTHROPLASTY Left     URETERORENOSCOPY Left 12/15/2021    Procedure: URETERORENOSCOPY WITH LASER LITHOTRIPSY AND INSERTION OF URETERAL STENT;  Surgeon: Hernando Hanson Jr., MD;  Location: [facility];  Service: Urology;  Laterality: Left;       Review of patient's allergies indicates:   Allergen Reactions    Codeine        Current Facility-Administered Medications   Medication    cetirizine tablet 10 mg    dextrose 50% injection 12.5 g    dextrose 50% injection 25 g    enoxaparin injection 40 mg    furosemide tablet 20 mg    glucagon (human recombinant) injection 1 mg    glucose chewable tablet 16 g    glucose chewable tablet 24 g    labetaloL injection 20 mg    methenamine tablet 1 g    morphine injection 2 mg    morphine injection 4 mg    naloxone 0.4 mg/mL injection 0.02 mg    ondansetron disintegrating tablet 8 mg    oxybutynin 24 hr tablet 15 mg    oxyCODONE immediate release tablet 5 mg    pantoprazole EC tablet 40 mg    polyethylene glycol packet 17 g    sodium chloride 0.9% flush 10 mL    trazodone split tablet 25 mg    verapamiL CR tablet 180 mg     Family History       Problem Relation (Age of Onset)    Cancer Son    Heart disease Sister    Stroke Father          Tobacco Use    Smoking status: Never    Smokeless tobacco: Never   Substance and Sexual Activity    Alcohol use: Never    Drug use: Never    Sexual activity: Not Currently     ROS  Objective:     Vital Signs (Most Recent):  Temp: 98 °F (36.7 °C) (12/16/24 1050)  Pulse: 99 (12/16/24 1050)  Resp: 18 (12/16/24 1110)  BP: (!)  "163/108 (12/16/24 1050)  SpO2: 97 % (12/16/24 1050) Vital Signs (24h Range):  Temp:  [97.3 °F (36.3 °C)-98.3 °F (36.8 °C)] 98 °F (36.7 °C)  Pulse:  [] 99  Resp:  [11-21] 18  SpO2:  [92 %-100 %] 97 %  BP: (101-163)/() 163/108     Weight: 93.6 kg (206 lb 5.6 oz)  Height: 5' 3" (160 cm)  Body mass index is 36.55 kg/m².      Intake/Output Summary (Last 24 hours) at 12/16/2024 1240  Last data filed at 12/16/2024 0000  Gross per 24 hour   Intake --   Output 300 ml   Net -300 ml                    Left Knee Exam     Inspection   Effusion: present    Tenderness   The patient tender to palpation of the tibial tubercle and lateral joint line.    Other   Sensation: normal    Vascular Exam       Left Pulses  Dorsalis Pedis:      1+             Significant Labs: All pertinent labs within the past 24 hours have been reviewed.    Significant Imaging: I have reviewed all pertinent imaging results/findings.  "

## 2024-12-16 NOTE — CONSULTS
[facility] - [facility] Tele[facility]  Orthopedics  Consult Note    Patient Name: Whitley Millard  MRN: [id number]  Admission Date: 12/15/2024  Hospital Length of Stay: 1 days  Attending Provider: Sudheer Foss DO  Primary Care Provider: Rishi Appiah MD    Patient information was obtained from patient, past medical records, and ER records.     Consults  Subjective:     Principal Problem:Periprosthetic fracture of proximal end of tibia    Chief Complaint:   Chief Complaint   Patient presents with    Fall     Pt reports falling this AM. She reports left ankle and hip pain         HPI: 89-year-old female with a history of total knee arthroplasty on left he sustained a fall she has a nondisplaced proximal tibia fracture on left with a proximal fibular shaft fracture with minimal angulation and then avulsion off the tip of the distal fibula  Left lower extremity she moves her toes has sensation to touch has palpable pulses there is swelling of the leg noted she has x-rays that show fracture of the proximal tibia and the proximal fibula with a distal fibula avulsion injury she moves her toes has sensation to touch she does have palpable pulses in the leg.  Patient does have 0-100 degrees of flexion of the knee some pain on motion of the knee  At this time she has proximal tibia and fibula fracture of the nondisplaced with the avulsion of the dip of the distal fibula.  I put her in a knee immobilizer.  Keep her toe-touch weight-bearing.  We will have therapy work with her for wheelchair transfers.  Rehab placement we would be good to help her with the strengthening.  She can slowly progress from wheelchair to toe-touch weight-bearing with a walker as she is safe with therapy we will recheck films in 1-2 weeks.    Past Medical History:   Diagnosis Date    Cholelithiases     Essential hypertension 08/26/2021    GERD (gastroesophageal reflux disease)     Macular degeneration     Osteoarthrosis     Pulmonary  hypertension 2021    RVSP  37 mmHg    Ureteral stone with hydronephrosis        Past Surgical History:   Procedure Laterality Date    CARPAL TUNNEL RELEASE Bilateral     FOOT SURGERY Left     TOTAL HIP ARTHROPLASTY Right     TOTAL KNEE ARTHROPLASTY Left     URETERORENOSCOPY Left 12/15/2021    Procedure: URETERORENOSCOPY WITH LASER LITHOTRIPSY AND INSERTION OF URETERAL STENT;  Surgeon: Hernando Hanson Jr., MD;  Location: [facility];  Service: Urology;  Laterality: Left;       Review of patient's allergies indicates:   Allergen Reactions    Codeine        Current Facility-Administered Medications   Medication    cetirizine tablet 10 mg    dextrose 50% injection 12.5 g    dextrose 50% injection 25 g    enoxaparin injection 40 mg    furosemide tablet 20 mg    glucagon (human recombinant) injection 1 mg    glucose chewable tablet 16 g    glucose chewable tablet 24 g    labetaloL injection 20 mg    methenamine tablet 1 g    morphine injection 2 mg    morphine injection 4 mg    naloxone 0.4 mg/mL injection 0.02 mg    ondansetron disintegrating tablet 8 mg    oxybutynin 24 hr tablet 15 mg    oxyCODONE immediate release tablet 5 mg    pantoprazole EC tablet 40 mg    polyethylene glycol packet 17 g    sodium chloride 0.9% flush 10 mL    trazodone split tablet 25 mg    verapamiL CR tablet 180 mg     Family History       Problem Relation (Age of Onset)    Cancer Son    Heart disease Sister    Stroke Father          Tobacco Use    Smoking status: Never    Smokeless tobacco: Never   Substance and Sexual Activity    Alcohol use: Never    Drug use: Never    Sexual activity: Not Currently     ROS  Objective:     Vital Signs (Most Recent):  Temp: 98 °F (36.7 °C) (12/16/24 1050)  Pulse: 99 (12/16/24 1050)  Resp: 18 (12/16/24 1110)  BP: (!) 163/108 (12/16/24 1050)  SpO2: 97 % (12/16/24 1050) Vital Signs (24h Range):  Temp:  [97.3 °F (36.3 °C)-98.3 °F (36.8 °C)] 98 °F (36.7 °C)  Pulse:  [] 99  Resp:  [11-21] 18  SpO2:  [92 %-100  "%] 97 %  BP: (101-163)/() 163/108     Weight: 93.6 kg (206 lb 5.6 oz)  Height: 5' 3" (160 cm)  Body mass index is 36.55 kg/m².      Intake/Output Summary (Last 24 hours) at 12/16/2024 1240  Last data filed at 12/16/2024 0000  Gross per 24 hour   Intake --   Output 300 ml   Net -300 ml                    Left Knee Exam     Inspection   Effusion: present    Tenderness   The patient tender to palpation of the tibial tubercle and lateral joint line.    Other   Sensation: normal    Vascular Exam       Left Pulses  Dorsalis Pedis:      1+             Significant Labs: All pertinent labs within the past 24 hours have been reviewed.    Significant Imaging: I have reviewed all pertinent imaging results/findings.  Assessment/Plan:     No notes have been filed under this hospital service.  Service: Orthopedic Surgery      Thank you for your consult. I will follow-up with patient. Please contact us if you have any additional questions.    Mihai Nielsen MD  Orthopedics  [facility] - [address] [place] [address]        "

## 2024-12-16 NOTE — H&P
[facility] - Emergency Department  [facility] Medicine  History & Physical    Patient Name: Whitley Millard  MRN: [id number]  Patient Class: IP- Inpatient  Admission Date: 12/15/2024  Attending Physician: Dae Nunn DO   Primary Care Provider: Rishi Appiah MD         Patient information was obtained from patient, relative(s), past medical records, and ER records.     Subjective:     Principal Problem:Periprosthetic fracture of proximal end of tibia    Chief Complaint:   Chief Complaint   Patient presents with    Fall     Pt reports falling this AM. She reports left ankle and hip pain         HPI: Ms. Millard is a pleasant 89-year-old white female with a past medical history of chronic UTI, nephrolithiasis, hypertension, macular degeneration who presents to the ED after a mechanical fall at home.  Ms. Millard was attempting to go to the bathroom and was able to make it there but on her way back was not able make it back to her bed and fell.  She experienced extreme pain at that time.  Imaging in the ED revealed fibula fracture and periprosthetic tibial fracture.  Her only complaint at the time my interview was pain.  She denies any loss of consciousness.  She denies tripping over anything but says her feet stick when she attempts to walk longer distances and this caused her to fall.  She denies any fever, chills, shortness of breath chest pain nausea vomiting diarrhea.    Past Medical History:   Diagnosis Date    Cholelithiases     Essential hypertension 08/26/2021    GERD (gastroesophageal reflux disease)     Macular degeneration     Osteoarthrosis     Pulmonary hypertension 2021    RVSP  37 mmHg    Ureteral stone with hydronephrosis        Past Surgical History:   Procedure Laterality Date    CARPAL TUNNEL RELEASE Bilateral     FOOT SURGERY Left     TOTAL HIP ARTHROPLASTY Right     TOTAL KNEE ARTHROPLASTY Left     URETERORENOSCOPY Left 12/15/2021    Procedure: URETERORENOSCOPY WITH LASER  LITHOTRIPSY AND INSERTION OF URETERAL STENT;  Surgeon: Hernando Hanson Jr., MD;  Location: [facility] [place];  Service: Urology;  Laterality: Left;       Review of patient's allergies indicates:   Allergen Reactions    Codeine        No current facility-administered medications on file prior to encounter.     Current Outpatient Medications on File Prior to Encounter   Medication Sig    cetirizine (ZYRTEC) 10 MG tablet Take 10 mg by mouth daily as needed.     esomeprazole (NEXIUM) 40 MG capsule TAKE 1 CAPSULE BY MOUTH EVERY DAY BEFORE BREAKFAST    furosemide (LASIX) 20 MG tablet Take 20 mg by mouth every other day.    methenamine (HIPREX) 1 gram Tab TAKE 1 TABLET BY MOUTH TWICE DAILY    multivitamin (THERAGRAN) per tablet Take 1 tablet by mouth once daily.    oxybutynin (DITROPAN XL) 15 MG TR24 Take 15 mg by mouth once daily.    verapamiL (CALAN-SR) 180 MG CR tablet Take 180 mg by mouth once daily.    [DISCONTINUED] sulfamethoxazole-trimethoprim 800-160mg (BACTRIM DS) 800-160 mg Tab Take 1 tablet by mouth 2 (two) times daily. Take with probiotics.  Discontinue methenamine hippurate while taking this drug and restart when completed (Patient not taking: Reported on 4/5/2022)    [DISCONTINUED] verapamiL (VERELAN) 180 MG C24P Take 180 mg by mouth once daily.     Family History       Problem Relation (Age of Onset)    Cancer Son    Heart disease Sister    Stroke Father          Tobacco Use    Smoking status: Never    Smokeless tobacco: Never   Substance and Sexual Activity    Alcohol use: Never    Drug use: Never    Sexual activity: Not Currently     Review of Systems   Constitutional:  Negative for chills, fatigue, fever and unexpected weight change.   HENT:  Negative for congestion, mouth sores and sore throat.    Eyes:  Negative for photophobia and visual disturbance.   Respiratory:  Negative for cough, chest tightness, shortness of breath and wheezing.    Cardiovascular:  Negative for chest pain, palpitations and leg  swelling.   Gastrointestinal:  Negative for abdominal pain, diarrhea, nausea and vomiting.   Endocrine: Negative for cold intolerance and heat intolerance.   Genitourinary:  Negative for difficulty urinating, dysuria, frequency and urgency.   Musculoskeletal:  Positive for arthralgias and gait problem. Negative for back pain and myalgias.   Skin:  Negative for pallor and rash.   Neurological:  Negative for tremors, seizures, syncope, weakness, numbness and headaches.   Hematological:  Does not bruise/bleed easily.   Psychiatric/Behavioral:  Negative for agitation, confusion, hallucinations and suicidal ideas.      Objective:     Vital Signs (Most Recent):  Temp: 98.5 °F (36.9 °C) (12/15/24 1117)  Pulse: 107 (12/15/24 1800)  Resp: 19 (12/15/24 1800)  BP: 139/85 (12/15/24 1801)  SpO2: 97 % (12/15/24 1759) Vital Signs (24h Range):  Temp:  [98.5 °F (36.9 °C)] 98.5 °F (36.9 °C)  Pulse:  [] 107  Resp:  [11-21] 19  SpO2:  [95 %-100 %] 97 %  BP: (101-164)/(54-85) 139/85     Weight: 85.3 kg (188 lb)  Body mass index is 33.3 kg/m².     Physical Exam  Vitals reviewed.   Constitutional:       Appearance: Normal appearance.   HENT:      Head: Normocephalic and atraumatic.      Nose: Nose normal.   Eyes:      Conjunctiva/sclera: Conjunctivae normal.   Neck:      Trachea: Trachea normal.   Cardiovascular:      Rate and Rhythm: Regular rhythm. Tachycardia present.      Pulses: Normal pulses.      Heart sounds: Normal heart sounds.   Pulmonary:      Effort: Pulmonary effort is normal.      Breath sounds: Normal breath sounds and air entry.   Abdominal:      General: Bowel sounds are normal.      Palpations: Abdomen is soft.   Musculoskeletal:      Cervical back: Neck supple.      Comments: Tenderness in the left lower extremity   Skin:     General: Skin is warm and dry.   Neurological:      Mental Status: She is alert. Mental status is at baseline.      Comments: Grossly normal motor and sensory function without focal deficit  appreciated.   Psychiatric:         Mood and Affect: Mood and affect normal.         Behavior: Behavior is cooperative.                Significant Labs: All pertinent labs within the past 24 hours have been reviewed.    Significant Imaging: I have reviewed all pertinent imaging results/findings within the past 24 hours.  Assessment/Plan:     * Periprosthetic fracture of proximal end of tibia  NPO at midnight for evaluation by Orthopedic surgery in the morning      Fibula fracture  NPO at midnight for evaluation by ortho surgery in the morning      Fall  PTOT      Essential hypertension  Patient's blood pressure range in the last 24 hours was: BP  Min: 101/65  Max: 164/59.The patient's inpatient anti-hypertensive regimen is listed below:  Current Antihypertensives  furosemide tablet 20 mg, Every other day, Oral  verapamiL CR tablet 180 mg, Daily, Oral    Plan  - BP is controlled, no changes needed to their regimen  - follow  Optimize pain control    GERD (gastroesophageal reflux disease)  Continue PPI        VTE Risk Mitigation (From admission, onward)           Ordered     enoxaparin injection 40 mg  Daily         12/15/24 1802     IP VTE HIGH RISK PATIENT  Once         12/15/24 1802     Place sequential compression device  Until discontinued         12/15/24 1802                                    Sudheer Foss DO  Department of Hospital Medicine  [facility] - Emergency Department

## 2024-12-16 NOTE — HOSPITAL COURSE
12/16 PTOT eval today  12/17-continue therapy  12/18 plan for discharge to swing bed tomorrow  12/19 having some left wrist pain today  12/20 stable for discharge.  We will send with prednisone for gout.  Once symptoms resolve can slowly taper off.  Follow up with Dr. Nielsen for repeat films in 1-2 weeks

## 2024-12-16 NOTE — ASSESSMENT & PLAN NOTE
Patient's blood pressure range in the last 24 hours was: BP  Min: 101/65  Max: 163/108.The patient's inpatient anti-hypertensive regimen is listed below:  Current Antihypertensives  furosemide tablet 20 mg, Every other day, Oral  verapamiL CR tablet 180 mg, Daily, Oral  labetaloL injection 20 mg, Every 6 hours PRN, Intravenous    Plan  - BP is controlled, no changes needed to their regimen  - follow  Optimize pain control

## 2024-12-16 NOTE — SUBJECTIVE & OBJECTIVE
Interval History:  No acute events overnight    Review of Systems  Objective:     Vital Signs (Most Recent):  Temp: 98 °F (36.7 °C) (12/16/24 1050)  Pulse: 99 (12/16/24 1050)  Resp: 18 (12/16/24 1110)  BP: (!) 163/108 (12/16/24 1050)  SpO2: 97 % (12/16/24 1050) Vital Signs (24h Range):  Temp:  [97.3 °F (36.3 °C)-98.3 °F (36.8 °C)] 98 °F (36.7 °C)  Pulse:  [] 99  Resp:  [11-21] 18  SpO2:  [92 %-100 %] 97 %  BP: (101-163)/() 163/108     Weight: 93.6 kg (206 lb 5.6 oz)  Body mass index is 36.55 kg/m².    Intake/Output Summary (Last 24 hours) at 12/16/2024 1231  Last data filed at 12/16/2024 0000  Gross per 24 hour   Intake --   Output 300 ml   Net -300 ml         Physical Exam  Vitals reviewed.   Constitutional:       Appearance: Normal appearance.   HENT:      Head: Normocephalic and atraumatic.      Nose: Nose normal.   Eyes:      Conjunctiva/sclera: Conjunctivae normal.   Neck:      Trachea: Trachea normal.   Cardiovascular:      Rate and Rhythm: Regular rhythm. Tachycardia present.      Pulses: Normal pulses.      Heart sounds: Normal heart sounds.   Pulmonary:      Effort: Pulmonary effort is normal.      Breath sounds: Normal breath sounds and air entry.   Abdominal:      General: Bowel sounds are normal.      Palpations: Abdomen is soft.   Musculoskeletal:      Cervical back: Neck supple.      Comments: Tenderness in the left lower extremity   Skin:     General: Skin is warm and dry.   Neurological:      Mental Status: She is alert. Mental status is at baseline.      Comments: Grossly normal motor and sensory function without focal deficit appreciated.   Psychiatric:         Mood and Affect: Mood and affect normal.         Behavior: Behavior is cooperative.             Significant Labs: All pertinent labs within the past 24 hours have been reviewed.    Significant Imaging: I have reviewed all pertinent imaging results/findings within the past 24 hours.

## 2024-12-16 NOTE — HPI
Ms. Millard is a pleasant 89-year-old white female with a past medical history of chronic UTI, nephrolithiasis, hypertension, macular degeneration who presents to the ED after a mechanical fall at home.  Ms. Millard was attempting to go to the bathroom and was able to make it there but on her way back was not able make it back to her bed and fell.  She experienced extreme pain at that time.  Imaging in the ED revealed fibula fracture and periprosthetic tibial fracture.  Her only complaint at the time my interview was pain.  She denies any loss of consciousness.  She denies tripping over anything but says her feet stick when she attempts to walk longer distances and this caused her to fall.  She denies any fever, chills, shortness of breath chest pain nausea vomiting diarrhea.

## 2024-12-16 NOTE — PLAN OF CARE
Problem: Physical Therapy  Goal: Physical Therapy Goal  Description: Short Term Goals  Supine to sit minimum  Sit to stand minimum  SPT minimum  5. Independent with HEP    Long Term Goals  Supine to sit cga  Sit to stand cga  SPT cga  Needed equipment for home.         Outcome: Progressing

## 2024-12-16 NOTE — PLAN OF CARE
[facility] - [facility]  Initial Discharge Assessment       Primary Care Provider: Rishi Appiah MD    Admission Diagnosis: Trauma [T14.90XA]  DVT (deep venous thrombosis) [I82.409]  Chest pain [R07.9]  Periprosthetic fracture of proximal end of tibia, initial encounter [M97.8XXA, Z96.659]    Admission Date: 12/15/2024  Expected Discharge Date:     Transition of Care Barriers: None    Payor: MEDICARE / Plan: MEDICARE PART A & B / Product Type: Government /     Extended Emergency Contact Information  Primary Emergency Contact: Sedrick Millard  Mobile Phone: [phone]  Relation: Son   needed? No    Discharge Plan A: Skilled Nursing Facility  Discharge Plan B: Home Health, Long-term acute care facility (LTAC), New Nursing Home placement - [facility] care facility, Skilled Nursing Facility, Rehab      [id number] [place] - [address] AT [facility] [address]  [address] [place] [address]  Phone: [phone] Fax: [phone]      Initial Assessment (most recent)       Adult Discharge Assessment - 12/16/24 1355          Discharge Assessment    Assessment Type Discharge Planning Assessment     Source of Information patient;family     People in Home alone     Do you expect to return to your current living situation? No     Do you have help at home or someone to help you manage your care at home? No     Prior to hospitilization cognitive status: Alert/Oriented     Current cognitive status: Alert/Oriented     Walking or Climbing Stairs Difficulty yes     Walking or Climbing Stairs transferring difficulty, dependent;stair climbing difficulty, dependent;ambulation difficulty, dependent     Mobility Management used rollator pta     Dressing/Bathing Difficulty yes     Dressing/Bathing bathing difficulty, assistance 1 person;dressing difficulty, assistance 1 person     Home Accessibility stairs to enter home     Number of Stairs, Main Entrance three      Home Layout Able to live on 1st floor     Equipment Currently Used at Home rollator     Patient currently being followed by outpatient case management? No     Do you currently have service(s) that help you manage your care at home? Yes     Name and Contact number of agency [facility]     Is the pt/caregiver preference to resume services with current agency Yes     Do you take prescription medications? Yes     Do you have prescription coverage? Yes     Do you have any problems affording any of your prescribed medications? No     Who is going to help you get home at discharge? son     How do you get to doctors appointments? family or friend will provide     Are you on dialysis? No     Do you take coumadin? No     Discharge Plan A Skilled Nursing Facility     Discharge Plan B Home Health;Long-term acute care facility (LTAC);New Nursing Home placement - [facility] care facility;Skilled Nursing Facility;Rehab     DME Needed Upon Discharge  none     Discharge Plan discussed with: Patient;Adult children     Transition of Care Barriers None        Physical Activity    On average, how many days per week do you engage in moderate to strenuous exercise (like a brisk walk)? 0 days     On average, how many minutes do you engage in exercise at this level? 0 min        Financial Resource Strain    How hard is it for you to pay for the very basics like food, housing, medical care, and heating? Not hard at all        Housing Stability    In the last 12 months, was there a time when you were not able to pay the mortgage or rent on time? No     At any time in the past 12 months, were you homeless or living in a shelter (including now)? No        Transportation Needs    Has the lack of transportation kept you from medical appointments, meetings, work or from getting things needed for daily living? No        Food Insecurity    Within the past 12 months, you worried that your food would run out before you got the money to buy more.  Never true     Within the past 12 months, the food you bought just didn't last and you didn't have money to get more. Never true        Stress    Do you feel stress - tense, restless, nervous, or anxious, or unable to sleep at night because your mind is troubled all the time - these days? Only a little        Social Isolation    How often do you feel lonely or isolated from those around you?  Never        Alcohol Use    Q1: How often do you have a drink containing alcohol? Never     Q2: How many drinks containing alcohol do you have on a typical day when you are drinking? Patient does not drink     Q3: How often do you have six or more drinks on one occasion? Never        Utilities    In the past 12 months has the electric, gas, oil, or water company threatened to shut off services in your home? No        Health Literacy    How often do you need to have someone help you when you read instructions, pamphlets, or other written material from your doctor or pharmacy? Rarely                 Spoke with pt and pt's son, pt lives home alone, current with [facility], notified nelson with  that pt in hospital per son's request, has jorge melgar completed, pt may need swb/snf at [place], choices discussed with pt/son, awaiting final choice then cm will make referrals, following

## 2024-12-16 NOTE — HPI
89-year-old female with a history of total knee arthroplasty on left he sustained a fall she has a nondisplaced proximal tibia fracture on left with a proximal fibular shaft fracture with minimal angulation and then avulsion off the tip of the distal fibula  Left lower extremity she moves her toes has sensation to touch has palpable pulses there is swelling of the leg noted she has x-rays that show fracture of the proximal tibia and the proximal fibula with a distal fibula avulsion injury she moves her toes has sensation to touch she does have palpable pulses in the leg.  Patient does have 0-100 degrees of flexion of the knee some pain on motion of the knee  At this time she has proximal tibia and fibula fracture of the nondisplaced with the avulsion of the dip of the distal fibula.  I put her in a knee immobilizer.  Keep her toe-touch weight-bearing.  We will have therapy work with her for wheelchair transfers.  Rehab placement we would be good to help her with the strengthening.  She can slowly progress from wheelchair to toe-touch weight-bearing with a walker as she is safe with therapy we will recheck films in 1-2 weeks.

## 2024-12-16 NOTE — PLAN OF CARE
Problem: Occupational Therapy  Goal: Occupational Therapy Goal  Description: ST.Pt will perform bathing with ModA with setup at EOB  2.Pt will perform UE dressing with Flor  3.Pt will perform LE dressing with ModA  4.Pt will transfer bed/chair/bsc with ModA with RW  5.Pt will perform standing task x 1 min with ModA with RW  6.Tolerate 15 min of tx without fatigue.      LTG:   Restore to max I with selfcare and mobility.      Outcome: Progressing

## 2024-12-16 NOTE — PLAN OF CARE
Problem: Skin Injury Risk Increased  Goal: Skin Health and Integrity  Outcome: Progressing  Intervention: Optimize Skin Protection  Flowsheets (Taken 12/15/2024 2213)  Pressure Reduction Techniques:   frequent weight shift encouraged   heels elevated off bed   positioned off wounds   pressure points protected   rest period provided between sit times   weight shift assistance provided  Pressure Reduction Devices: positioning supports utilized  Skin Protection:   incontinence pads utilized   transparent dressing maintained  Activity Management: Arm raise - L1  Head of Bed (HOB) Positioning: HOB elevated  Intervention: Promote and Optimize Oral Intake  Flowsheets (Taken 12/15/2024 2213)  Oral Nutrition Promotion:   adaptive equipment use encouraged   physical activity promoted   referred to outpatient services   rest periods promoted   social interaction promoted  Nutrition Interventions: meal set-up provided

## 2024-12-16 NOTE — PROGRESS NOTES
[facility]  Progress Note    Patient Name: Whitley Millard  MRN: [id number]  Patient Class: IP- Inpatient   Admission Date: 12/15/2024  Length of Stay: 1 days  Attending Physician: Sudheer Foss DO  Primary Care Provider: Rishi Appiah MD        Subjective     Principal Problem:Periprosthetic fracture of proximal end of tibia        HPI:  Ms. Millard is a pleasant 89-year-old white female with a past medical history of chronic UTI, nephrolithiasis, hypertension, macular degeneration who presents to the ED after a mechanical fall at home.  Ms. Millard was attempting to go to the bathroom and was able to make it there but on her way back was not able make it back to her bed and fell.  She experienced extreme pain at that time.  Imaging in the ED revealed fibula fracture and periprosthetic tibial fracture.  Her only complaint at the time my interview was pain.  She denies any loss of consciousness.  She denies tripping over anything but says her feet stick when she attempts to walk longer distances and this caused her to fall.  She denies any fever, chills, shortness of breath chest pain nausea vomiting diarrhea.    Overview/Hospital Course:  12/16 PTOT eval today    Interval History:  No acute events overnight    Review of Systems  Objective:     Vital Signs (Most Recent):  Temp: 98 °F (36.7 °C) (12/16/24 1050)  Pulse: 99 (12/16/24 1050)  Resp: 18 (12/16/24 1110)  BP: (!) 163/108 (12/16/24 1050)  SpO2: 97 % (12/16/24 1050) Vital Signs (24h Range):  Temp:  [97.3 °F (36.3 °C)-98.3 °F (36.8 °C)] 98 °F (36.7 °C)  Pulse:  [] 99  Resp:  [11-21] 18  SpO2:  [92 %-100 %] 97 %  BP: (101-163)/() 163/108     Weight: 93.6 kg (206 lb 5.6 oz)  Body mass index is 36.55 kg/m².    Intake/Output Summary (Last 24 hours) at 12/16/2024 1231  Last data filed at 12/16/2024 0000  Gross per 24 hour   Intake --   Output 300 ml   Net -300 ml         Physical Exam  Vitals  reviewed.   Constitutional:       Appearance: Normal appearance.   HENT:      Head: Normocephalic and atraumatic.      Nose: Nose normal.   Eyes:      Conjunctiva/sclera: Conjunctivae normal.   Neck:      Trachea: Trachea normal.   Cardiovascular:      Rate and Rhythm: Regular rhythm. Tachycardia present.      Pulses: Normal pulses.      Heart sounds: Normal heart sounds.   Pulmonary:      Effort: Pulmonary effort is normal.      Breath sounds: Normal breath sounds and air entry.   Abdominal:      General: Bowel sounds are normal.      Palpations: Abdomen is soft.   Musculoskeletal:      Cervical back: Neck supple.      Comments: Tenderness in the left lower extremity   Skin:     General: Skin is warm and dry.   Neurological:      Mental Status: She is alert. Mental status is at baseline.      Comments: Grossly normal motor and sensory function without focal deficit appreciated.   Psychiatric:         Mood and Affect: Mood and affect normal.         Behavior: Behavior is cooperative.             Significant Labs: All pertinent labs within the past 24 hours have been reviewed.    Significant Imaging: I have reviewed all pertinent imaging results/findings within the past 24 hours.    Assessment and Plan     * Periprosthetic fracture of proximal end of tibia  NPO at midnight for evaluation by Orthopedic surgery in the morning  Can eat non up    Fibula fracture  NPO at midnight for evaluation by ortho surgery in the morning  Can eat non op    Fall  PTOT      Essential hypertension  Patient's blood pressure range in the last 24 hours was: BP  Min: 101/65  Max: 163/108.The patient's inpatient anti-hypertensive regimen is listed below:  Current Antihypertensives  furosemide tablet 20 mg, Every other day, Oral  verapamiL CR tablet 180 mg, Daily, Oral  labetaloL injection 20 mg, Every 6 hours PRN, Intravenous    Plan  - BP is controlled, no changes needed to their regimen  - follow  Optimize pain control    GERD  (gastroesophageal reflux disease)  Continue PPI        VTE Risk Mitigation (From admission, onward)           Ordered     enoxaparin injection 40 mg  Daily         12/15/24 1802     IP VTE HIGH RISK PATIENT  Once         12/15/24 1802     Place sequential compression device  Until discontinued         12/15/24 1802                    Discharge Planning   CHASITY:      Code Status: Full Code   Medical Readiness for Discharge Date:          Labetalol p.r.n.        Please place Justification for DME        Sudheer Foss DO  Department of Hospital Medicine   [facility] - [address] [place] [address]

## 2024-12-16 NOTE — ASSESSMENT & PLAN NOTE
Patient's blood pressure range in the last 24 hours was: BP  Min: 101/65  Max: 164/59.The patient's inpatient anti-hypertensive regimen is listed below:  Current Antihypertensives  furosemide tablet 20 mg, Every other day, Oral  verapamiL CR tablet 180 mg, Daily, Oral    Plan  - BP is controlled, no changes needed to their regimen  - follow  Optimize pain control

## 2024-12-16 NOTE — PLAN OF CARE
Problem: Skin Injury Risk Increased  Goal: Skin Health and Integrity  12/16/2024 0151 by Jose Flannery RN  Outcome: Progressing  12/16/2024 0150 by Jose Flannery RN  Outcome: Progressing     Problem: Adult Inpatient Plan of Care  Goal: Plan of Care Review  12/16/2024 0151 by Jose Flannery RN  Outcome: Progressing  12/16/2024 0150 by Jose Flannery RN  Outcome: Progressing  Goal: Patient-Specific Goal (Individualized)  12/16/2024 0151 by Jose Flannery RN  Outcome: Progressing  12/16/2024 0150 by Jose Flannery RN  Outcome: Progressing  Goal: Absence of Hospital-Acquired Illness or Injury  12/16/2024 0151 by Jose Flannery RN  Outcome: Progressing  12/16/2024 0150 by Jose Flannery RN  Outcome: Progressing  Goal: Optimal Comfort and Wellbeing  12/16/2024 0151 by Jose Flannery RN  Outcome: Progressing  12/16/2024 0150 by Jose Flannery RN  Outcome: Progressing  Goal: Readiness for Transition of Care  12/16/2024 0151 by Jose Flannery RN  Outcome: Progressing  12/16/2024 0150 by Jose Flannery RN  Outcome: Progressing     Problem: Fall Injury Risk  Goal: Absence of Fall and Fall-Related Injury  12/16/2024 0151 by Jose Flannery RN  Outcome: Progressing  12/16/2024 0150 by Jose Flannery RN  Outcome: Progressing     Problem: Orthopaedic Fracture  Goal: Absence of Bleeding  12/16/2024 0151 by Jose Flannery RN  Outcome: Progressing  12/16/2024 0150 by Jose Flannery RN  Outcome: Progressing  Goal: Bowel Elimination  12/16/2024 0151 by Jose Flannery, RN  Outcome: Progressing  12/16/2024 0150 by Jose Flannery RN  Outcome: Progressing  Goal: Absence of Embolism Signs and Symptoms  12/16/2024 0151 by Jose Flannery RN  Outcome: Progressing  12/16/2024 0150 by Jose Flannery RN  Outcome: Progressing  Goal: Fracture Stability  12/16/2024 0151 by Jose Flannery RN  Outcome: Progressing  12/16/2024 0150 by Jose Flannery, RN  Outcome: Progressing  Goal: Optimal Functional Ability  12/16/2024 0151 by Jose Flannery, RN  Outcome:  Progressing  12/16/2024 0150 by Jose Flannery RN  Outcome: Progressing  Goal: Absence of Infection Signs and Symptoms  12/16/2024 0151 by Jose Flannery RN  Outcome: Progressing  12/16/2024 0150 by Jose Flannery RN  Outcome: Progressing  Goal: Effective Tissue Perfusion  12/16/2024 0151 by Jose Flannery RN  Outcome: Progressing  12/16/2024 0150 by Jose Flannery RN  Outcome: Progressing  Goal: Optimal Pain Control and Function  12/16/2024 0151 by Jose Flannery RN  Outcome: Progressing  12/16/2024 0150 by Jose Flannery RN  Outcome: Progressing  Goal: Effective Oxygenation and Ventilation  12/16/2024 0151 by Jose Flannery RN  Outcome: Progressing  12/16/2024 0150 by Jose Flannery RN  Outcome: Progressing     Problem: Pain Acute  Goal: Optimal Pain Control and Function  Outcome: Progressing

## 2024-12-16 NOTE — PLAN OF CARE
Choice from pt's son cruzito goodwin for 1. [facility] and 2. [facility], referral called and faxed to dima at [facility] [place], pt will need tb test and ss sheet if accepted, packet started

## 2024-12-16 NOTE — PT/OT/SLP EVAL
Physical Therapy Evaluation    Patient Name:  Whitley Millard   MRN:  [id number]    Recommendations:     Discharge Recommendations:     Discharge Equipment Recommendations:     Barriers to discharge: None    Assessment:     Whitley Millard is a 89 y.o. female admitted with a medical diagnosis of Periprosthetic fracture of proximal end of tibia.  She presents with the following impairments/functional limitations:   Patient with restriction of nwb left le due to fx tibia. Mobility of gait probably not a possibility at this time due to generalized weakness and pain. Patient does have the ability to improve bed mobility and transfer nwb left le until fx heals. Would benefit from sb to improve mobility prior to dc home. .    Rehab Prognosis: Good; patient would benefit from acute skilled PT services to address these deficits and reach maximum level of function.    Recent Surgery: * No surgery found *      Plan:     During this hospitalization, patient to be seen   to address the identified rehab impairments via   and progress toward the following goals:    Plan of Care Expires:       Subjective     Chief Complaint: pain all over  Patient/Family Comments/goals: Patient states falling at home when going to bathroom  Pain/Comfort:  Pain Rating 1: 6/10  Location - Side 1: Left  Location 1: leg  Pain Addressed 1: Cessation of Activity    Patients cultural, spiritual, Gnosticism conflicts given the current situation:      Living Environment:  Lives alone, 3 steps entering home  Prior to admission, patients level of function was independent household with rollator.  Equipment used at home:  .  DME owned (not currently used): rolling walker.  Upon discharge, patient will have assistance from son.    Objective:     Communicated with nurse prior to session.  Patient found HOB elevated with    upon PT entry to room.    General Precautions: Standard,    Orthopedic Precautions:    Braces:    Respiratory Status: Room  air    Exams:  Cognitive Exam:  Patient is oriented to Person, Place, and Situation  RLE ROM: WFL  RLE Strength: 3/5  LLE ROM: knee immobilized  LLE Strength: 3/5    Functional Mobility:  Bed Mobility:     Supine to Sit: moderate assistance  Sit to Supine: moderate assistance  Transfers:     Sit to Stand:  maximal assistance with hand-held assist  Gait: unable to maintain nwb left le      AM-PAC 6 CLICK MOBILITY  Total Score:10       Treatment & Education:  Nwb left le  Dc options  Tx plan    Patient left HOB elevated with call button in reach and bed alarm on.    GOALS:   Multidisciplinary Problems       Physical Therapy Goals          Problem: Physical Therapy    Goal Priority Disciplines Outcome Interventions   Physical Therapy Goal     PT, PT/OT Progressing    Description: Short Term Goals  Supine to sit minimum  Sit to stand minimum  SPT minimum  5. Independent with HEP    Long Term Goals  Supine to sit cga  Sit to stand cga  SPT cga  Needed equipment for home.                              History:     Past Medical History:   Diagnosis Date    Cholelithiases     Essential hypertension 08/26/2021    GERD (gastroesophageal reflux disease)     Macular degeneration     Osteoarthrosis     Pulmonary hypertension 2021    RVSP  37 mmHg    Ureteral stone with hydronephrosis        Past Surgical History:   Procedure Laterality Date    CARPAL TUNNEL RELEASE Bilateral     FOOT SURGERY Left     TOTAL HIP ARTHROPLASTY Right     TOTAL KNEE ARTHROPLASTY Left     URETERORENOSCOPY Left 12/15/2021    Procedure: URETERORENOSCOPY WITH LASER LITHOTRIPSY AND INSERTION OF URETERAL STENT;  Surgeon: Hernando Hasnon Jr., MD;  Location: [facility];  Service: Urology;  Laterality: Left;       Time Tracking:     PT Received On: 12/16/24  PT Start Time: 1410     PT Stop Time: 1435  PT Total Time (min): 25 min     Billable Minutes: Evaluation 20 12/16/2024

## 2024-12-17 PROCEDURE — 99231 SBSQ HOSP IP/OBS SF/LOW 25: CPT | Mod: ,,, | Performed by: STUDENT IN AN ORGANIZED HEALTH CARE EDUCATION/TRAINING PROGRAM

## 2024-12-17 PROCEDURE — 63600175 PHARM REV CODE 636 W HCPCS: Performed by: STUDENT IN AN ORGANIZED HEALTH CARE EDUCATION/TRAINING PROGRAM

## 2024-12-17 PROCEDURE — 97110 THERAPEUTIC EXERCISES: CPT

## 2024-12-17 PROCEDURE — 11000001 HC ACUTE MED/SURG PRIVATE ROOM

## 2024-12-17 PROCEDURE — 25000003 PHARM REV CODE 250: Performed by: STUDENT IN AN ORGANIZED HEALTH CARE EDUCATION/TRAINING PROGRAM

## 2024-12-17 PROCEDURE — 97530 THERAPEUTIC ACTIVITIES: CPT

## 2024-12-17 RX ADMIN — OXYBUTYNIN CHLORIDE 15 MG: 5 TABLET, EXTENDED RELEASE ORAL at 08:12

## 2024-12-17 RX ADMIN — PANTOPRAZOLE SODIUM 40 MG: 40 TABLET, DELAYED RELEASE ORAL at 08:12

## 2024-12-17 RX ADMIN — OXYCODONE 5 MG: 5 TABLET ORAL at 05:12

## 2024-12-17 RX ADMIN — METHENAMINE HIPPURATE 1 G: 1 TABLET ORAL at 08:12

## 2024-12-17 RX ADMIN — VERAPAMIL HYDROCHLORIDE 180 MG: 180 TABLET ORAL at 08:12

## 2024-12-17 RX ADMIN — OXYCODONE 5 MG: 5 TABLET ORAL at 12:12

## 2024-12-17 RX ADMIN — METHENAMINE HIPPURATE 1 G: 1 TABLET ORAL at 09:12

## 2024-12-17 RX ADMIN — ENOXAPARIN SODIUM 40 MG: 40 INJECTION SUBCUTANEOUS at 05:12

## 2024-12-17 NOTE — PLAN OF CARE
Problem: Skin Injury Risk Increased  Goal: Skin Health and Integrity  Outcome: Progressing     Problem: Fall Injury Risk  Goal: Absence of Fall and Fall-Related Injury  Outcome: Progressing     Problem: Orthopaedic Fracture  Goal: Absence of Bleeding  Outcome: Progressing  Goal: Bowel Elimination  Outcome: Progressing  Goal: Absence of Embolism Signs and Symptoms  Outcome: Progressing  Goal: Fracture Stability  Outcome: Progressing  Goal: Effective Tissue Perfusion  Outcome: Progressing

## 2024-12-17 NOTE — SUBJECTIVE & OBJECTIVE
Interval History:  No acute events overnight    Review of Systems  Objective:     Vital Signs (Most Recent):  Temp: 98.8 °F (37.1 °C) (12/17/24 1553)  Pulse: 99 (12/17/24 1553)  Resp: 18 (12/17/24 1553)  BP: (!) 162/102 (12/17/24 1553)  SpO2: (!) 93 % (12/17/24 1553) Vital Signs (24h Range):  Temp:  [98 °F (36.7 °C)-98.8 °F (37.1 °C)] 98.8 °F (37.1 °C)  Pulse:  [] 99  Resp:  [12-18] 18  SpO2:  [91 %-98 %] 93 %  BP: (141-162)/() 162/102     Weight: 93.6 kg (206 lb 5.6 oz)  Body mass index is 36.55 kg/m².    Intake/Output Summary (Last 24 hours) at 12/17/2024 1638  Last data filed at 12/17/2024 0400  Gross per 24 hour   Intake --   Output 600 ml   Net -600 ml         Physical Exam  Vitals reviewed.   Constitutional:       Appearance: Normal appearance.   HENT:      Head: Normocephalic and atraumatic.      Nose: Nose normal.   Eyes:      Conjunctiva/sclera: Conjunctivae normal.   Neck:      Trachea: Trachea normal.   Cardiovascular:      Rate and Rhythm: Regular rhythm. Tachycardia present.      Pulses: Normal pulses.      Heart sounds: Normal heart sounds.   Pulmonary:      Effort: Pulmonary effort is normal.      Breath sounds: Normal breath sounds and air entry.   Abdominal:      General: Bowel sounds are normal.      Palpations: Abdomen is soft.   Musculoskeletal:      Cervical back: Neck supple.      Comments: Tenderness in the left lower extremity   Skin:     General: Skin is warm and dry.   Neurological:      Mental Status: She is alert. Mental status is at baseline.      Comments: Grossly normal motor and sensory function without focal deficit appreciated.   Psychiatric:         Mood and Affect: Mood and affect normal.         Behavior: Behavior is cooperative.             Significant Labs: All pertinent labs within the past 24 hours have been reviewed.    Significant Imaging: I have reviewed all pertinent imaging results/findings within the past 24 hours.

## 2024-12-17 NOTE — PT/OT/SLP PROGRESS
Occupational Therapy   Treatment    Name: Whitley Millard  MRN: [id number]  Admitting Diagnosis:  Periprosthetic fracture of proximal end of tibia       Recommendations:     Discharge Recommendations: Moderate Intensity Therapy  Discharge Equipment Recommendations:  to be determined by next level of care  Barriers to discharge:  None    Assessment:     Whitley Millard is a 89 y.o. female with a medical diagnosis of Periprosthetic fracture of proximal end of tibia.  She presents with weakness and decline in ADLs. Performance deficits affecting function are weakness, impaired endurance, impaired self care skills, impaired functional mobility, gait instability, impaired balance, pain, orthopedic precautions.     Rehab Prognosis:  Good; patient would benefit from acute skilled OT services to address these deficits and reach maximum level of function.       Plan:     Patient to be seen 5 x/week to address the above listed problems via self-care/home management, therapeutic activities, therapeutic exercises  Plan of Care Expires: 01/13/25  Plan of Care Reviewed with: patient    Subjective     Chief Complaint: periprosthetic fracture of proximal tibia  Patient/Family Comments/goals: pt agreeable to OT tx  Pain/Comfort:  Pain Rating 1: 7/10  Location - Side 1: Left  Location 1: leg  Pain Addressed 1: Pre-medicate for activity, Nurse notified    Objective:     Communicated with: MARTIN Antoine prior to session.  Patient found up in chair with chair check, PureWick, peripheral IV upon OT entry to room.    General Precautions: Standard, fall    Orthopedic Precautions:LLE non weight bearing  Braces: Knee immobilizer  Respiratory Status: Room air     Occupational Performance:     Bed Mobility:    Not performed     Functional Mobility/Transfers:  Not performed    Activities of Daily Living:  Not performed      [facility] 6 Click ADL:      Treatment & Education:  Pt performed x 15 reps each bicep curls 1#db, chest press 1#db, IR/ER 1#db, and  shoulder flexion 1#db in order to improve BUE strength and endurance to perform ADL and ADL t/f with less assist.     Patient left up in chair with all lines intact, call button in reach, chair alarm on, and MARTIN Antoine notified    GOALS:   Multidisciplinary Problems       Occupational Therapy Goals          Problem: Occupational Therapy    Goal Priority Disciplines Outcome Interventions   Occupational Therapy Goal     OT, PT/OT Progressing    Description: ST.Pt will perform bathing with ModA with setup at EOB  2.Pt will perform UE dressing with Flor  3.Pt will perform LE dressing with ModA  4.Pt will transfer bed/chair/bsc with ModA with RW  5.Pt will perform standing task x 1 min with ModA with RW  6.Tolerate 15 min of tx without fatigue.      LTG:   Restore to max I with selfcare and mobility.                           Time Tracking:     OT Date of Treatment: 24  OT Start Time: 1327  OT Stop Time: 1347  OT Total Time (min): 20 min    Billable Minutes:Therapeutic Exercise 20 minutes    OT/ELISHA: OT          2024

## 2024-12-17 NOTE — ASSESSMENT & PLAN NOTE
Patient's blood pressure range in the last 24 hours was: BP  Min: 141/84  Max: 162/102.The patient's inpatient anti-hypertensive regimen is listed below:  Current Antihypertensives  furosemide tablet 20 mg, Every other day, Oral  verapamiL CR tablet 180 mg, Daily, Oral  labetaloL injection 20 mg, Every 6 hours PRN, Intravenous    Plan  - BP is controlled, no changes needed to their regimen  - follow  Optimize pain control

## 2024-12-17 NOTE — PROGRESS NOTES
[facility] Hospital Medicine  Progress Note    Patient Name: Whitley Millard  MRN: [id number]  Patient Class: IP- Inpatient   Admission Date: 12/15/2024  Length of Stay: 2 days  Attending Physician: Sudheer Foss DO  Primary Care Provider: Rishi Appiah MD        Subjective     Principal Problem:Periprosthetic fracture of proximal end of tibia        HPI:  Ms. Millard is a pleasant 89-year-old white female with a past medical history of chronic UTI, nephrolithiasis, hypertension, macular degeneration who presents to the ED after a mechanical fall at home.  Ms. Millard was attempting to go to the bathroom and was able to make it there but on her way back was not able make it back to her bed and fell.  She experienced extreme pain at that time.  Imaging in the ED revealed fibula fracture and periprosthetic tibial fracture.  Her only complaint at the time my interview was pain.  She denies any loss of consciousness.  She denies tripping over anything but says her feet stick when she attempts to walk longer distances and this caused her to fall.  She denies any fever, chills, shortness of breath chest pain nausea vomiting diarrhea.    Overview/Hospital Course:  12/16 PTOT eval today  12/17-continue therapy    Interval History:  No acute events overnight    Review of Systems  Objective:     Vital Signs (Most Recent):  Temp: 98.8 °F (37.1 °C) (12/17/24 1553)  Pulse: 99 (12/17/24 1553)  Resp: 18 (12/17/24 1553)  BP: (!) 162/102 (12/17/24 1553)  SpO2: (!) 93 % (12/17/24 1553) Vital Signs (24h Range):  Temp:  [98 °F (36.7 °C)-98.8 °F (37.1 °C)] 98.8 °F (37.1 °C)  Pulse:  [] 99  Resp:  [12-18] 18  SpO2:  [91 %-98 %] 93 %  BP: (141-162)/() 162/102     Weight: 93.6 kg (206 lb 5.6 oz)  Body mass index is 36.55 kg/m².    Intake/Output Summary (Last 24 hours) at 12/17/2024 1638  Last data filed at 12/17/2024 0400  Gross per 24 hour   Intake --   Output 600 ml   Net -600 ml          Physical Exam  Vitals reviewed.   Constitutional:       Appearance: Normal appearance.   HENT:      Head: Normocephalic and atraumatic.      Nose: Nose normal.   Eyes:      Conjunctiva/sclera: Conjunctivae normal.   Neck:      Trachea: Trachea normal.   Cardiovascular:      Rate and Rhythm: Regular rhythm. Tachycardia present.      Pulses: Normal pulses.      Heart sounds: Normal heart sounds.   Pulmonary:      Effort: Pulmonary effort is normal.      Breath sounds: Normal breath sounds and air entry.   Abdominal:      General: Bowel sounds are normal.      Palpations: Abdomen is soft.   Musculoskeletal:      Cervical back: Neck supple.      Comments: Tenderness in the left lower extremity   Skin:     General: Skin is warm and dry.   Neurological:      Mental Status: She is alert. Mental status is at baseline.      Comments: Grossly normal motor and sensory function without focal deficit appreciated.   Psychiatric:         Mood and Affect: Mood and affect normal.         Behavior: Behavior is cooperative.             Significant Labs: All pertinent labs within the past 24 hours have been reviewed.    Significant Imaging: I have reviewed all pertinent imaging results/findings within the past 24 hours.    Assessment and Plan     * Periprosthetic fracture of proximal end of tibia  NPO at midnight for evaluation by Orthopedic surgery in the morning  Can eat non up    Fibula fracture  NPO at midnight for evaluation by ortho surgery in the morning  Can eat non op    Fall  PTOT  Can't walk after fracture, PTOT    Essential hypertension  Patient's blood pressure range in the last 24 hours was: BP  Min: 141/84  Max: 162/102.The patient's inpatient anti-hypertensive regimen is listed below:  Current Antihypertensives  furosemide tablet 20 mg, Every other day, Oral  verapamiL CR tablet 180 mg, Daily, Oral  labetaloL injection 20 mg, Every 6 hours PRN, Intravenous    Plan  - BP is controlled, no changes needed to their  regimen  - follow  Optimize pain control    GERD (gastroesophageal reflux disease)  Continue PPI        VTE Risk Mitigation (From admission, onward)           Ordered     enoxaparin injection 40 mg  Daily         12/15/24 1802     IP VTE HIGH RISK PATIENT  Once         12/15/24 1802     Place sequential compression device  Until discontinued         12/15/24 1802                    Discharge Planning   CHASITY:      Code Status: Full Code   Medical Readiness for Discharge Date:   Discharge Plan A: Skilled Nursing Facility                Please place Justification for DME        Sudheer Foss DO  Department of Hospital Medicine   [facility]

## 2024-12-17 NOTE — PLAN OF CARE
Rec'd call from Elsa at [facility], updates faxed per request, referral still under review, trying to get secondary insurance verified, cm will ask pt's son to bring copy of insurance card to check for information needed by georgi gunn, following

## 2024-12-18 PROCEDURE — 25000003 PHARM REV CODE 250: Performed by: STUDENT IN AN ORGANIZED HEALTH CARE EDUCATION/TRAINING PROGRAM

## 2024-12-18 PROCEDURE — 97535 SELF CARE MNGMENT TRAINING: CPT

## 2024-12-18 PROCEDURE — 11000001 HC ACUTE MED/SURG PRIVATE ROOM

## 2024-12-18 PROCEDURE — 99232 SBSQ HOSP IP/OBS MODERATE 35: CPT | Mod: ,,, | Performed by: STUDENT IN AN ORGANIZED HEALTH CARE EDUCATION/TRAINING PROGRAM

## 2024-12-18 PROCEDURE — 63600175 PHARM REV CODE 636 W HCPCS: Performed by: STUDENT IN AN ORGANIZED HEALTH CARE EDUCATION/TRAINING PROGRAM

## 2024-12-18 PROCEDURE — 97530 THERAPEUTIC ACTIVITIES: CPT

## 2024-12-18 PROCEDURE — 97110 THERAPEUTIC EXERCISES: CPT

## 2024-12-18 RX ORDER — IBUPROFEN 400 MG/1
400 TABLET ORAL EVERY 6 HOURS PRN
Status: DISCONTINUED | OUTPATIENT
Start: 2024-12-18 | End: 2024-12-20 | Stop reason: HOSPADM

## 2024-12-18 RX ADMIN — VERAPAMIL HYDROCHLORIDE 180 MG: 180 TABLET ORAL at 09:12

## 2024-12-18 RX ADMIN — METHENAMINE HIPPURATE 1 G: 1 TABLET ORAL at 09:12

## 2024-12-18 RX ADMIN — PANTOPRAZOLE SODIUM 40 MG: 40 TABLET, DELAYED RELEASE ORAL at 09:12

## 2024-12-18 RX ADMIN — MORPHINE SULFATE 2 MG: 2 INJECTION, SOLUTION INTRAMUSCULAR; INTRAVENOUS at 10:12

## 2024-12-18 RX ADMIN — OXYBUTYNIN CHLORIDE 15 MG: 5 TABLET, EXTENDED RELEASE ORAL at 09:12

## 2024-12-18 RX ADMIN — OXYCODONE 5 MG: 5 TABLET ORAL at 05:12

## 2024-12-18 RX ADMIN — OXYCODONE 5 MG: 5 TABLET ORAL at 09:12

## 2024-12-18 RX ADMIN — FUROSEMIDE 20 MG: 20 TABLET ORAL at 09:12

## 2024-12-18 RX ADMIN — ENOXAPARIN SODIUM 40 MG: 40 INJECTION SUBCUTANEOUS at 05:12

## 2024-12-18 NOTE — PLAN OF CARE
Problem: Skin Injury Risk Increased  Goal: Skin Health and Integrity  Outcome: Progressing     Problem: Adult Inpatient Plan of Care  Goal: Plan of Care Review  Outcome: Progressing  Goal: Absence of Hospital-Acquired Illness or Injury  Outcome: Progressing  Goal: Optimal Comfort and Wellbeing  Outcome: Progressing  Goal: Readiness for Transition of Care  Outcome: Progressing     Problem: Fall Injury Risk  Goal: Absence of Fall and Fall-Related Injury  Outcome: Progressing

## 2024-12-18 NOTE — SUBJECTIVE & OBJECTIVE
Interval History:  No acute events overnight    Review of Systems  Objective:     Vital Signs (Most Recent):  Temp: 98.2 °F (36.8 °C) (12/18/24 1204)  Pulse: 97 (12/18/24 1204)  Resp: 18 (12/18/24 1204)  BP: 138/80 (12/18/24 1204)  SpO2: (!) 89 % (12/18/24 1204) Vital Signs (24h Range):  Temp:  [97.9 °F (36.6 °C)-98.9 °F (37.2 °C)] 98.2 °F (36.8 °C)  Pulse:  [] 97  Resp:  [17-18] 18  SpO2:  [89 %-93 %] 89 %  BP: ()/() 138/80     Weight: 93.6 kg (206 lb 5.6 oz)  Body mass index is 36.55 kg/m².    Intake/Output Summary (Last 24 hours) at 12/18/2024 1330  Last data filed at 12/18/2024 0817  Gross per 24 hour   Intake --   Output 700 ml   Net -700 ml         Physical Exam  Vitals reviewed.   Constitutional:       Appearance: Normal appearance.   HENT:      Head: Normocephalic and atraumatic.      Nose: Nose normal.   Eyes:      Conjunctiva/sclera: Conjunctivae normal.   Neck:      Trachea: Trachea normal.   Cardiovascular:      Rate and Rhythm: Regular rhythm. Tachycardia present.      Pulses: Normal pulses.      Heart sounds: Normal heart sounds.   Pulmonary:      Effort: Pulmonary effort is normal.      Breath sounds: Normal breath sounds and air entry.   Abdominal:      General: Bowel sounds are normal.      Palpations: Abdomen is soft.   Musculoskeletal:      Cervical back: Neck supple.      Comments: Tenderness in the left lower extremity   Skin:     General: Skin is warm and dry.   Neurological:      Mental Status: She is alert. Mental status is at baseline.      Comments: Grossly normal motor and sensory function without focal deficit appreciated.   Psychiatric:         Mood and Affect: Mood and affect normal.         Behavior: Behavior is cooperative.             Significant Labs: All pertinent labs within the past 24 hours have been reviewed.    Significant Imaging: I have reviewed all pertinent imaging results/findings within the past 24 hours.

## 2024-12-18 NOTE — PLAN OF CARE
Pt accepted at [facility] for tomorrow. Cut off 2 pm. Ss spoke with dr goodwin and he is to complete paperwork in am before pt arrives. Rn informed. Ambulance paperwork to be faxed first thing in  am for transport time of 11pm. Ss following.

## 2024-12-18 NOTE — PLAN OF CARE
Problem: Skin Injury Risk Increased  Goal: Skin Health and Integrity  Outcome: Progressing     Problem: Adult Inpatient Plan of Care  Goal: Plan of Care Review  Outcome: Progressing  Goal: Patient-Specific Goal (Individualized)  Outcome: Progressing  Goal: Absence of Hospital-Acquired Illness or Injury  Outcome: Progressing  Goal: Optimal Comfort and Wellbeing  Outcome: Progressing  Goal: Readiness for Transition of Care  Outcome: Progressing     Problem: Fall Injury Risk  Goal: Absence of Fall and Fall-Related Injury  Outcome: Progressing     Problem: Orthopaedic Fracture  Goal: Absence of Bleeding  Outcome: Progressing  Goal: Bowel Elimination  Outcome: Progressing  Goal: Absence of Embolism Signs and Symptoms  Outcome: Progressing  Goal: Fracture Stability  Outcome: Progressing  Goal: Optimal Functional Ability  Outcome: Progressing  Goal: Absence of Infection Signs and Symptoms  Outcome: Progressing  Goal: Effective Tissue Perfusion  Outcome: Progressing  Goal: Optimal Pain Control and Function  Outcome: Progressing  Goal: Effective Oxygenation and Ventilation  Outcome: Progressing     Problem: Pain Acute  Goal: Optimal Pain Control and Function  Outcome: Progressing

## 2024-12-18 NOTE — PROGRESS NOTES
[facility]  Wound Care    Patient Name:  Whitley Millard   MRN:  [id number]  Date: 12/18/2024  Diagnosis: Periprosthetic fracture of proximal end of tibia    History:     Past Medical History:   Diagnosis Date    Cholelithiases     Essential hypertension 08/26/2021    GERD (gastroesophageal reflux disease)     Macular degeneration     Osteoarthrosis     Pulmonary hypertension 2021    RVSP  37 mmHg    Ureteral stone with hydronephrosis        Social History     Socioeconomic History    Marital status:    Tobacco Use    Smoking status: Never    Smokeless tobacco: Never   Substance and Sexual Activity    Alcohol use: Never    Drug use: Never    Sexual activity: Not Currently     Social Drivers of Health     Financial Resource Strain: Low Risk  (12/16/2024)    Overall Financial Resource Strain (CARDIA)     Difficulty of Paying Living Expenses: Not hard at all   Food Insecurity: No Food Insecurity (12/16/2024)    Hunger Vital Sign     Worried About Running Out of Food in the Last Year: Never true     Ran Out of Food in the Last Year: Never true   Transportation Needs: No Transportation Needs (12/16/2024)    TRANSPORTATION NEEDS     Transportation : No   Physical Activity: Inactive (12/16/2024)    Exercise Vital Sign     Days of Exercise per Week: 0 days     Minutes of Exercise per Session: 0 min   Stress: No Stress Concern Present (12/16/2024)    [employer] [place] [employer] - Occupational Stress Questionnaire     Feeling of Stress : Only a little   Housing Stability: Low Risk  (12/16/2024)    Housing Stability Vital Sign     Unable to Pay for Housing in the Last Year: No     Homeless in the Last Year: No       Precautions:     Allergies as of 12/15/2024 - Reviewed 12/15/2024   Allergen Reaction Noted    Codeine  08/25/2021       [facility] Assessment Details/Treatment     Narrative: Seen patient for initiation of preventative skin care measures    Patient in bed, Alert.  Has overlay to bed. Heels ok. Has immobilizer to left heel. Unable to evaluate sacral due to pain. Applied Mepliex foam borders to heels. See nursing notes for sacral assessment.  Jefry score 16    Consult wound care for any skin issues         12/18/2024

## 2024-12-18 NOTE — PROGRESS NOTES
[facility] Hospital Medicine  Progress Note    Patient Name: Whitley Millard  MRN: [id number]  Patient Class: IP- Inpatient   Admission Date: 12/15/2024  Length of Stay: 3 days  Attending Physician: Sudheer Foss DO  Primary Care Provider: Rishi Appiah MD        Subjective     Principal Problem:Periprosthetic fracture of proximal end of tibia        HPI:  Ms. Millard is a pleasant 89-year-old white female with a past medical history of chronic UTI, nephrolithiasis, hypertension, macular degeneration who presents to the ED after a mechanical fall at home.  Ms. Millard was attempting to go to the bathroom and was able to make it there but on her way back was not able make it back to her bed and fell.  She experienced extreme pain at that time.  Imaging in the ED revealed fibula fracture and periprosthetic tibial fracture.  Her only complaint at the time my interview was pain.  She denies any loss of consciousness.  She denies tripping over anything but says her feet stick when she attempts to walk longer distances and this caused her to fall.  She denies any fever, chills, shortness of breath chest pain nausea vomiting diarrhea.    Overview/Hospital Course:  12/16 PTOT eval today  12/17-continue therapy  12/18 plan for discharge to swing bed tomorrow    Interval History:  No acute events overnight    Review of Systems  Objective:     Vital Signs (Most Recent):  Temp: 98.2 °F (36.8 °C) (12/18/24 1204)  Pulse: 97 (12/18/24 1204)  Resp: 18 (12/18/24 1204)  BP: 138/80 (12/18/24 1204)  SpO2: (!) 89 % (12/18/24 1204) Vital Signs (24h Range):  Temp:  [97.9 °F (36.6 °C)-98.9 °F (37.2 °C)] 98.2 °F (36.8 °C)  Pulse:  [] 97  Resp:  [17-18] 18  SpO2:  [89 %-93 %] 89 %  BP: ()/() 138/80     Weight: 93.6 kg (206 lb 5.6 oz)  Body mass index is 36.55 kg/m².    Intake/Output Summary (Last 24 hours) at 12/18/2024 1330  Last data filed at 12/18/2024 0817  Gross per 24 hour    Intake --   Output 700 ml   Net -700 ml         Physical Exam  Vitals reviewed.   Constitutional:       Appearance: Normal appearance.   HENT:      Head: Normocephalic and atraumatic.      Nose: Nose normal.   Eyes:      Conjunctiva/sclera: Conjunctivae normal.   Neck:      Trachea: Trachea normal.   Cardiovascular:      Rate and Rhythm: Regular rhythm. Tachycardia present.      Pulses: Normal pulses.      Heart sounds: Normal heart sounds.   Pulmonary:      Effort: Pulmonary effort is normal.      Breath sounds: Normal breath sounds and air entry.   Abdominal:      General: Bowel sounds are normal.      Palpations: Abdomen is soft.   Musculoskeletal:      Cervical back: Neck supple.      Comments: Tenderness in the left lower extremity   Skin:     General: Skin is warm and dry.   Neurological:      Mental Status: She is alert. Mental status is at baseline.      Comments: Grossly normal motor and sensory function without focal deficit appreciated.   Psychiatric:         Mood and Affect: Mood and affect normal.         Behavior: Behavior is cooperative.             Significant Labs: All pertinent labs within the past 24 hours have been reviewed.    Significant Imaging: I have reviewed all pertinent imaging results/findings within the past 24 hours.    Assessment and Plan     * Periprosthetic fracture of proximal end of tibia  NPO at midnight for evaluation by Orthopedic surgery in the morning  Can eat non up    Fibula fracture  NPO at midnight for evaluation by ortho surgery in the morning  Can eat non op    Fall  PTOT  Can't walk after fracture, PTOT    Essential hypertension  Patient's blood pressure range in the last 24 hours was: BP  Min: 98/63  Max: 162/102.The patient's inpatient anti-hypertensive regimen is listed below:  Current Antihypertensives  furosemide tablet 20 mg, Every other day, Oral  verapamiL CR tablet 180 mg, Daily, Oral  labetaloL injection 20 mg, Every 6 hours PRN, Intravenous    Plan  - BP  is controlled, no changes needed to their regimen  - follow  Optimize pain control    GERD (gastroesophageal reflux disease)  Continue PPI        VTE Risk Mitigation (From admission, onward)           Ordered     enoxaparin injection 40 mg  Daily         12/15/24 1802     IP VTE HIGH RISK PATIENT  Once         12/15/24 1802     Place sequential compression device  Until discontinued         12/15/24 1802                    Discharge Planning   CHASITY:      Code Status: Full Code   Medical Readiness for Discharge Date:   Discharge Plan A: Skilled Nursing Facility          Add ibuprofen today.  Check COVID      Please place Justification for DME        Sudheer Foss DO  Department of Hospital Medicine   [facility] - [address] [place] [address]

## 2024-12-18 NOTE — PT/OT/SLP PROGRESS
Occupational Therapy   Treatment    Name: Whitley Millard  MRN: [id number]  Admitting Diagnosis:  Periprosthetic fracture of proximal end of tibia       Recommendations:     Discharge Recommendations: Moderate Intensity Therapy  Discharge Equipment Recommendations:  to be determined by next level of care  Barriers to discharge:  None    Assessment:     Whitley Millard is a 89 y.o. female with a medical diagnosis of Periprosthetic fracture of proximal end of tibia.  She presents with weakness and decline in ADLs. Performance deficits affecting function are weakness, impaired endurance, impaired self care skills, impaired functional mobility, gait instability, impaired balance, pain, orthopedic precautions.     Rehab Prognosis:  Good; patient would benefit from acute skilled OT services to address these deficits and reach maximum level of function.       Plan:     Patient to be seen 5 x/week to address the above listed problems via self-care/home management, therapeutic activities, therapeutic exercises  Plan of Care Expires: 01/13/25  Plan of Care Reviewed with: patient    Subjective     Chief Complaint: periprosthetic fracture of proximal tibia  Patient/Family Comments/goals: pt agreeable to OT tx  Pain/Comfort:  Pain Rating 1: 6/10  Location - Side 1: Left  Location 1: leg  Pain Addressed 1: Pre-medicate for activity    Objective:     Communicated with: MARTIN Bush prior to session.  Patient found HOB elevated with chair check, PureWick, peripheral IV upon OT entry to room.    General Precautions: Standard, fall    Orthopedic Precautions:LLE non weight bearing  Braces: Knee immobilizer  Respiratory Status: Room air     Occupational Performance:     Bed Mobility:    Not performed     Functional Mobility/Transfers:  Not performed    Activities of Daily Living:  Feeding:  moderate assistance to perform self feeding      [facility] 6 Click ADL:      Treatment & Education:  Pt performed ADL training as listed above. Pt with  difficulty with fine motor coordination to utilize utensils. Pt with ModA to perform self feeding with pre-loading utensils.     Patient left HOB elevated with all lines intact, call button in reach, and MARTIN Bush notified    GOALS:   Multidisciplinary Problems       Occupational Therapy Goals          Problem: Occupational Therapy    Goal Priority Disciplines Outcome Interventions   Occupational Therapy Goal     OT, PT/OT Progressing    Description: ST.Pt will perform bathing with ModA with setup at EOB  2.Pt will perform UE dressing with Flor  3.Pt will perform LE dressing with ModA  4.Pt will transfer bed/chair/bsc with ModA with RW  5.Pt will perform standing task x 1 min with ModA with RW  6.Tolerate 15 min of tx without fatigue.      LTG:   Restore to max I with selfcare and mobility.                           Time Tracking:     OT Date of Treatment: 24  OT Start Time: 1349  OT Stop Time: 1404  OT Total Time (min): 15 min    Billable Minutes:Self Care/Home Management 15 minutes    OT/ELISHA: OT          2024

## 2024-12-18 NOTE — ASSESSMENT & PLAN NOTE
Patient's blood pressure range in the last 24 hours was: BP  Min: 98/63  Max: 162/102.The patient's inpatient anti-hypertensive regimen is listed below:  Current Antihypertensives  furosemide tablet 20 mg, Every other day, Oral  verapamiL CR tablet 180 mg, Daily, Oral  labetaloL injection 20 mg, Every 6 hours PRN, Intravenous    Plan  - BP is controlled, no changes needed to their regimen  - follow  Optimize pain control

## 2024-12-18 NOTE — PT/OT/SLP PROGRESS
Physical Therapy Treatment    Patient Name:  Whitley Millard   MRN:  [id number]    Recommendations:     Discharge Recommendations:    Discharge Equipment Recommendations:    Barriers to discharge: Decreased caregiver support    Assessment:     Whitley Millard is a 89 y.o. female admitted with a medical diagnosis of Periprosthetic fracture of proximal end of tibia.  She presents with the following impairments/functional limitations:   Patient with controlled pain with transfers bed to chair. Not able to ambulate nwb due to generalized weakness preventing nwb adherence. Patient is able to transfer with assist and cues nwb left le. Tolerated sitting in chair for several hours. Awaiting transfer to swingbed    Rehab Prognosis: Good; patient would benefit from acute skilled PT services to address these deficits and reach maximum level of function.    Recent Surgery: * No surgery found *      Plan:     During this hospitalization, patient to be seen   to address the identified rehab impairments via   and progress toward the following goals:    Plan of Care Expires:       Subjective     Chief Complaint: left rib pain  Patient/Family Comments/goals: Patient agreeable to sit up in chair  Pain/Comfort:  Pain Rating 1: 6/10  Location - Side 1: Left  Location 1: leg  Pain Addressed 1: Cessation of Activity, Pre-medicate for activity      Objective:     Communicated with nurse prior to session.  Patient found HOB elevated with   upon PT entry to room.     General Precautions: Standard,    Orthopedic Precautions:    Braces:    Respiratory Status: Room air     Functional Mobility:  Bed Mobility:     Supine to Sit: moderate assistance  Sit to Supine: moderate assistance  Transfers:     Bed to Chair: maximal assistance with  no AD  using  Stand Pivot      AM-PAC 6 CLICK MOBILITY  Turning over in bed (including adjusting bedclothes, sheets and blankets)?: 2  Sitting down on and standing up from a chair with arms (e.g., wheelchair,  bedside commode, etc.): 2  Moving from lying on back to sitting on the side of the bed?: 2  Moving to and from a bed to a chair (including a wheelchair)?: 2  Need to walk in hospital room?: 1  Climbing 3-5 steps with a railing?: 1  Basic Mobility Total Score: 10       Treatment & Education:  Transfer training nwb left le. Constant cues to maintain.     Left le: aps x30  Right le: mre flexion ext 3x10    Patient left up in chair with call button in reach..    GOALS:   Multidisciplinary Problems       Physical Therapy Goals          Problem: Physical Therapy    Goal Priority Disciplines Outcome Interventions   Physical Therapy Goal     PT, PT/OT Progressing    Description: Short Term Goals  Supine to sit minimum  Sit to stand minimum  SPT minimum  5. Independent with HEP    Long Term Goals  Supine to sit cga  Sit to stand cga  SPT cga  Needed equipment for home.                              Time Tracking:     PT Received On: 12/18/24  PT Start Time: 1510     PT Stop Time: 1540  PT Total Time (min): 30 min     Billable Minutes: Evaluation 30    Treatment Type: Treatment  PT/PTA: PT     Number of PTA visits since last PT visit: 0     12/18/2024

## 2024-12-18 NOTE — PT/OT/SLP PROGRESS
Physical Therapy Treatment    Patient Name:  Whitley Millard   MRN:  [id number]    Recommendations:     Discharge Recommendations:    Discharge Equipment Recommendations:    Barriers to discharge: Decreased caregiver support    Assessment:     Whitley Millard is a 89 y.o. female admitted with a medical diagnosis of Periprosthetic fracture of proximal end of tibia.  She presents with the following impairments/functional limitations:   Patient with controlled pain with transfers bed to chair. Not able to ambulate nwb due to generalized weakness preventing nwb adherence. Patient is able to transfer with assist and cues nwb left le. Tolerated sitting in chair for several hours. Awaiting transfer to swingbed    Rehab Prognosis: Good; patient would benefit from acute skilled PT services to address these deficits and reach maximum level of function.    Recent Surgery: * No surgery found *      Plan:     During this hospitalization, patient to be seen   to address the identified rehab impairments via   and progress toward the following goals:    Plan of Care Expires:       Subjective     Chief Complaint: left rib pain  Patient/Family Comments/goals: Patient agreeable to sit up in chair  Pain/Comfort:  Pain Rating 1: 7/10  Location - Side 1: Left  Location 1: leg  Pain Addressed 1: Cessation of Activity, Pre-medicate for activity      Objective:     Communicated with nurse prior to session.  Patient found HOB elevated with   upon PT entry to room.     General Precautions: Standard,    Orthopedic Precautions:    Braces:    Respiratory Status: Room air     Functional Mobility:  Bed Mobility:     Supine to Sit: moderate assistance  Sit to Supine: moderate assistance  Transfers:     Bed to Chair: maximal assistance with  no AD  using  Stand Pivot      AM-PAC 6 CLICK MOBILITY  Turning over in bed (including adjusting bedclothes, sheets and blankets)?: 2  Sitting down on and standing up from a chair with arms (e.g., wheelchair,  bedside commode, etc.): 2  Moving from lying on back to sitting on the side of the bed?: 2  Moving to and from a bed to a chair (including a wheelchair)?: 2  Need to walk in hospital room?: 1  Climbing 3-5 steps with a railing?: 1  Basic Mobility Total Score: 10       Treatment & Education:  Transfer training nwb left le. Constant cues to maintain.     Left le: aps x30  Right le: mre flexion ext 3x10    Patient left up in chair with call button in reach..    GOALS:   Multidisciplinary Problems       Physical Therapy Goals          Problem: Physical Therapy    Goal Priority Disciplines Outcome Interventions   Physical Therapy Goal     PT, PT/OT Progressing    Description: Short Term Goals  Supine to sit minimum  Sit to stand minimum  SPT minimum  5. Independent with HEP    Long Term Goals  Supine to sit cga  Sit to stand cga  SPT cga  Needed equipment for home.                              Time Tracking:     PT Received On: 12/17/24  PT Start Time: 1100     PT Stop Time: 1130  PT Total Time (min): 30 min     Billable Minutes: Evaluation 30    Treatment Type: Treatment  PT/PTA: PT     Number of PTA visits since last PT visit: 0     12/17/2024

## 2024-12-19 PROBLEM — M79.643 HAND PAIN: Status: ACTIVE | Noted: 2024-12-19

## 2024-12-19 LAB
OHS QRS DURATION: 82 MS
OHS QTC CALCULATION: 422 MS
SARS-COV-2 RDRP RESP QL NAA+PROBE: NEGATIVE
URATE SERPL-MCNC: 7.6 MG/DL (ref 2.6–6)

## 2024-12-19 PROCEDURE — 97110 THERAPEUTIC EXERCISES: CPT

## 2024-12-19 PROCEDURE — 63600175 PHARM REV CODE 636 W HCPCS: Performed by: STUDENT IN AN ORGANIZED HEALTH CARE EDUCATION/TRAINING PROGRAM

## 2024-12-19 PROCEDURE — 99232 SBSQ HOSP IP/OBS MODERATE 35: CPT | Mod: ,,, | Performed by: STUDENT IN AN ORGANIZED HEALTH CARE EDUCATION/TRAINING PROGRAM

## 2024-12-19 PROCEDURE — 84550 ASSAY OF BLOOD/URIC ACID: CPT | Performed by: STUDENT IN AN ORGANIZED HEALTH CARE EDUCATION/TRAINING PROGRAM

## 2024-12-19 PROCEDURE — 87635 SARS-COV-2 COVID-19 AMP PRB: CPT | Performed by: STUDENT IN AN ORGANIZED HEALTH CARE EDUCATION/TRAINING PROGRAM

## 2024-12-19 PROCEDURE — 97530 THERAPEUTIC ACTIVITIES: CPT

## 2024-12-19 PROCEDURE — 30200315 PPD INTRADERMAL TEST REV CODE 302: Performed by: STUDENT IN AN ORGANIZED HEALTH CARE EDUCATION/TRAINING PROGRAM

## 2024-12-19 PROCEDURE — 11000001 HC ACUTE MED/SURG PRIVATE ROOM

## 2024-12-19 PROCEDURE — 86580 TB INTRADERMAL TEST: CPT | Performed by: STUDENT IN AN ORGANIZED HEALTH CARE EDUCATION/TRAINING PROGRAM

## 2024-12-19 PROCEDURE — 25000003 PHARM REV CODE 250: Performed by: STUDENT IN AN ORGANIZED HEALTH CARE EDUCATION/TRAINING PROGRAM

## 2024-12-19 PROCEDURE — 36415 COLL VENOUS BLD VENIPUNCTURE: CPT | Performed by: STUDENT IN AN ORGANIZED HEALTH CARE EDUCATION/TRAINING PROGRAM

## 2024-12-19 RX ORDER — PREDNISONE 20 MG/1
40 TABLET ORAL DAILY
Status: DISCONTINUED | OUTPATIENT
Start: 2024-12-19 | End: 2024-12-20 | Stop reason: HOSPADM

## 2024-12-19 RX ADMIN — TUBERCULIN PURIFIED PROTEIN DERIVATIVE 5 UNITS: 5 INJECTION, SOLUTION INTRADERMAL at 12:12

## 2024-12-19 RX ADMIN — MORPHINE SULFATE 2 MG: 2 INJECTION, SOLUTION INTRAMUSCULAR; INTRAVENOUS at 02:12

## 2024-12-19 RX ADMIN — PANTOPRAZOLE SODIUM 40 MG: 40 TABLET, DELAYED RELEASE ORAL at 08:12

## 2024-12-19 RX ADMIN — METHENAMINE HIPPURATE 1 G: 1 TABLET ORAL at 08:12

## 2024-12-19 RX ADMIN — VERAPAMIL HYDROCHLORIDE 180 MG: 180 TABLET ORAL at 08:12

## 2024-12-19 RX ADMIN — MORPHINE SULFATE 2 MG: 2 INJECTION, SOLUTION INTRAMUSCULAR; INTRAVENOUS at 08:12

## 2024-12-19 RX ADMIN — IBUPROFEN 400 MG: 400 TABLET, FILM COATED ORAL at 09:12

## 2024-12-19 RX ADMIN — PREDNISONE 40 MG: 20 TABLET ORAL at 02:12

## 2024-12-19 RX ADMIN — OXYBUTYNIN CHLORIDE 15 MG: 5 TABLET, EXTENDED RELEASE ORAL at 08:12

## 2024-12-19 RX ADMIN — OXYCODONE 5 MG: 5 TABLET ORAL at 11:12

## 2024-12-19 RX ADMIN — ENOXAPARIN SODIUM 40 MG: 40 INJECTION SUBCUTANEOUS at 04:12

## 2024-12-19 NOTE — PT/OT/SLP PROGRESS
Physical Therapy Treatment    Patient Name:  Whitley Millard   MRN:  [id number]    Recommendations:     Discharge Recommendations:    Discharge Equipment Recommendations:    Barriers to discharge: Decreased caregiver support    Assessment:     Whitley Millard is a 89 y.o. female admitted with a medical diagnosis of Periprosthetic fracture of proximal end of tibia.  She presents with the following impairments/functional limitations:   Patient a little anxious with oob activity. Patient having some edema in left hand. Plan is dc to swinbed tomorrow..    Rehab Prognosis: Good; patient would benefit from acute skilled PT services to address these deficits and reach maximum level of function.    Recent Surgery: * No surgery found *      Plan:     During this hospitalization, patient to be seen   to address the identified rehab impairments via   and progress toward the following goals:    Plan of Care Expires:       Subjective     Chief Complaint: Patient complains of left hand pain  Patient/Family Comments/goals: Patient agrees to sit up in chair.   Pain/Comfort:  Pain Rating 1: 6/10  Location - Side 1: Left  Location 1: hand  Pain Addressed 1: Cessation of Activity, Pre-medicate for activity      Objective:     Communicated with nurse prior to session.  Patient found supine with   upon PT entry to room.     General Precautions: Standard,    Orthopedic Precautions:    Braces:    Respiratory Status: Room air     Functional Mobility:  Bed Mobility:     Supine to Sit: moderate assistance  Transfers:     Sit to Stand:  maximal assistance with hand-held assist  Bed to Chair: maximal assistance with  hand-held assist  using  Stand Pivot and Squat Pivot      AM-PAC 6 CLICK MOBILITY  Turning over in bed (including adjusting bedclothes, sheets and blankets)?: 2  Sitting down on and standing up from a chair with arms (e.g., wheelchair, bedside commode, etc.): 2  Moving from lying on back to sitting on the side of the bed?: 2  Moving  to and from a bed to a chair (including a wheelchair)?: 2  Need to walk in hospital room?: 1  Climbing 3-5 steps with a railing?: 1  Basic Mobility Total Score: 10       Treatment & Education:  RLE: aps, hs, abd-add 3x10  LLE: aps x30    Patient left up in chair with call button in reach..    GOALS:   Multidisciplinary Problems       Physical Therapy Goals          Problem: Physical Therapy    Goal Priority Disciplines Outcome Interventions   Physical Therapy Goal     PT, PT/OT Progressing    Description: Short Term Goals  Supine to sit minimum  Sit to stand minimum  SPT minimum  5. Independent with HEP    Long Term Goals  Supine to sit cga  Sit to stand cga  SPT cga  Needed equipment for home.                              Time Tracking:     PT Received On: 12/19/24  PT Start Time: 1100     PT Stop Time: 1125  PT Total Time (min): 25 min     Billable Minutes: Therapeutic Activity 10 and Therapeutic Exercise 15    Treatment Type: Treatment  PT/PTA: PT     Number of PTA visits since last PT visit: 0     12/19/2024

## 2024-12-19 NOTE — PLAN OF CARE
Ss spoke with bryanna with [facility] and pt is accepted for tomorrow. Dr and rn informed. Ss following.

## 2024-12-19 NOTE — PROGRESS NOTES
[facility] Hospital Medicine  Progress Note    Patient Name: Whitley Millard  MRN: [id number]  Patient Class: IP- Inpatient   Admission Date: 12/15/2024  Length of Stay: 4 days  Attending Physician: Sudheer Foss DO  Primary Care Provider: Rishi Appiah MD        Subjective     Principal Problem:Periprosthetic fracture of proximal end of tibia        HPI:  Ms. Millard is a pleasant 89-year-old white female with a past medical history of chronic UTI, nephrolithiasis, hypertension, macular degeneration who presents to the ED after a mechanical fall at home.  Ms. Millard was attempting to go to the bathroom and was able to make it there but on her way back was not able make it back to her bed and fell.  She experienced extreme pain at that time.  Imaging in the ED revealed fibula fracture and periprosthetic tibial fracture.  Her only complaint at the time my interview was pain.  She denies any loss of consciousness.  She denies tripping over anything but says her feet stick when she attempts to walk longer distances and this caused her to fall.  She denies any fever, chills, shortness of breath chest pain nausea vomiting diarrhea.    Overview/Hospital Course:  12/16 PTOT eval today  12/17-continue therapy  12/18 plan for discharge to swing bed tomorrow  12/19 having some left wrist pain today    Interval History:  No acute events overnight    Review of Systems  Objective:     Vital Signs (Most Recent):  Temp: 97.9 °F (36.6 °C) (12/19/24 0756)  Pulse: 87 (12/19/24 0756)  Resp: 14 (12/19/24 1109)  BP: 122/80 (12/19/24 0756)  SpO2: 95 % (12/19/24 0756) Vital Signs (24h Range):  Temp:  [97.7 °F (36.5 °C)-99.6 °F (37.6 °C)] 97.9 °F (36.6 °C)  Pulse:  [] 87  Resp:  [14-18] 14  SpO2:  [89 %-96 %] 95 %  BP: (101-138)/(66-81) 122/80     Weight: 93.6 kg (206 lb 5.6 oz)  Body mass index is 36.55 kg/m².    Intake/Output Summary (Last 24 hours) at 12/19/2024 1112  Last data filed at  12/19/2024 1000  Gross per 24 hour   Intake --   Output 1250 ml   Net -1250 ml         Physical Exam  Vitals reviewed.   Constitutional:       Appearance: Normal appearance.   HENT:      Head: Normocephalic and atraumatic.      Nose: Nose normal.   Eyes:      Conjunctiva/sclera: Conjunctivae normal.   Neck:      Trachea: Trachea normal.   Cardiovascular:      Rate and Rhythm: Regular rhythm. Tachycardia present.      Pulses: Normal pulses.      Heart sounds: Normal heart sounds.   Pulmonary:      Effort: Pulmonary effort is normal.      Breath sounds: Normal breath sounds and air entry.   Abdominal:      General: Bowel sounds are normal.      Palpations: Abdomen is soft.   Musculoskeletal:      Cervical back: Neck supple.      Comments: Redness and tenderness in the left hand   Skin:     General: Skin is warm and dry.   Neurological:      Mental Status: She is alert. Mental status is at baseline.      Comments: Grossly normal motor and sensory function without focal deficit appreciated.   Psychiatric:         Mood and Affect: Mood and affect normal.         Behavior: Behavior is cooperative.             Significant Labs: All pertinent labs within the past 24 hours have been reviewed.    Significant Imaging: I have reviewed all pertinent imaging results/findings within the past 24 hours.    Assessment and Plan     * Periprosthetic fracture of proximal end of tibia  NPO at midnight for evaluation by Orthopedic surgery in the morning  Can eat non up    Fibula fracture  NPO at midnight for evaluation by ortho surgery in the morning  Can eat non op    Fall  PTOT  Can't walk after fracture, PTOT    Essential hypertension  Patient's blood pressure range in the last 24 hours was: BP  Min: 101/66  Max: 138/80.The patient's inpatient anti-hypertensive regimen is listed below:  Current Antihypertensives  furosemide tablet 20 mg, Every other day, Oral  verapamiL CR tablet 180 mg, Daily, Oral  labetaloL injection 20 mg, Every 6  hours PRN, Intravenous    Plan  - BP is controlled, no changes needed to their regimen  - follow  Optimize pain control    GERD (gastroesophageal reflux disease)  Continue PPI        VTE Risk Mitigation (From admission, onward)           Ordered     enoxaparin injection 40 mg  Daily         12/15/24 1802     IP VTE HIGH RISK PATIENT  Once         12/15/24 1802     Place sequential compression device  Until discontinued         12/15/24 1802                    Discharge Planning   CHASITY:      Code Status: Full Code   Medical Readiness for Discharge Date:   Discharge Plan A: Skilled Nursing Facility        Check uric acid.  Start prednisone to see if this helps        Please place Justification for DME        Sudheer Foss DO  Department of Hospital Medicine   [facility] - [address] [place] [address]

## 2024-12-19 NOTE — PLAN OF CARE
Ss spoke with pt's son, dr goodwin and family has decided for a referral to be sent to [facility]. Ss spoke with bryanna with [facility] and stated they have a bed available and could possible take tomorrow once referral reviewed. Ss informed dima with [facility]. Ss following.

## 2024-12-19 NOTE — PT/OT/SLP PROGRESS
Occupational Therapy   Treatment    Name: Whitley Millard  MRN: [id number]  Admitting Diagnosis:  Periprosthetic fracture of proximal end of tibia       Recommendations:     Discharge Recommendations: Moderate Intensity Therapy  Discharge Equipment Recommendations:  to be determined by next level of care  Barriers to discharge:  None    Assessment:     Whitley Millard is a 89 y.o. female with a medical diagnosis of Periprosthetic fracture of proximal end of tibia.  She presents with weakness and decline in ADLs. Performance deficits affecting function are weakness, impaired endurance, impaired self care skills, impaired functional mobility, gait instability, impaired balance, pain, orthopedic precautions. Pt with complaints of left hand pain upon OT arrival with edema noted to left hand. Pt agreeable to participate in OT tx.     Rehab Prognosis:  Good; patient would benefit from acute skilled OT services to address these deficits and reach maximum level of function.       Plan:     Patient to be seen 5 x/week to address the above listed problems via self-care/home management, therapeutic activities, therapeutic exercises  Plan of Care Expires: 01/13/25  Plan of Care Reviewed with: patient    Subjective     Chief Complaint: periprosthetic fracture of proximal tibia  Patient/Family Comments/goals: pt agreeable to OT tx  Pain/Comfort:  Pain Rating 1: 6/10  Location - Side 1: Left  Location 1: hand  Pain Addressed 1: Pre-medicate for activity    Objective:     Communicated with: MARTIN Cotton prior to session.  Patient found up in chair with osmin Mireles IV upon OT entry to room.    General Precautions: Standard, fall    Orthopedic Precautions:LLE non weight bearing  Braces: Knee immobilizer  Respiratory Status: Room air     Occupational Performance:     Bed Mobility:    Not performed     Functional Mobility/Transfers:  Not performed    Activities of Daily Living:  Not performed      [facility] 6 Click ADL:      Treatment &  Education:  Pt performed x 20 reps each AROM shoulder flexion, chest press, elbow flexion/extension, and IR/ER in order to improve BUE strength and endurance to perform ADL and ADL t/f with less assist.     Patient left up in chair with all lines intact, call button in reach, chair alarm on, and MARTIN Cotton notified    GOALS:   Multidisciplinary Problems       Occupational Therapy Goals          Problem: Occupational Therapy    Goal Priority Disciplines Outcome Interventions   Occupational Therapy Goal     OT, PT/OT Progressing    Description: ST.Pt will perform bathing with ModA with setup at EOB  2.Pt will perform UE dressing with Flor  3.Pt will perform LE dressing with ModA  4.Pt will transfer bed/chair/bsc with ModA with RW  5.Pt will perform standing task x 1 min with ModA with RW  6.Tolerate 15 min of tx without fatigue.      LTG:   Restore to max I with selfcare and mobility.                           Time Tracking:     OT Date of Treatment: 24  OT Start Time: 1121  OT Stop Time: 1140  OT Total Time (min): 19 min    Billable Minutes:Therapeutic Exercise 19 minutes    OT/ELISHA: OT          2024

## 2024-12-19 NOTE — ASSESSMENT & PLAN NOTE
Does not appear to have a fracture.  Red swollen wrist and metacarpophalangeal joint of the 3rd finger.  Checking uric acid.  We will start prednisone due to age.  Concern with side effects of long-term NSAID use.  We will not use colchicine with verapamil.  Suspect gout given elevated uric acid and acute onset with redness and swelling.  No risk factors for septic joint.  Have started prednisone.  Once symptoms improve for 48 hours can begin to slowly wean prednisone.

## 2024-12-19 NOTE — ASSESSMENT & PLAN NOTE
Patient's blood pressure range in the last 24 hours was: BP  Min: 101/66  Max: 138/80.The patient's inpatient anti-hypertensive regimen is listed below:  Current Antihypertensives  furosemide tablet 20 mg, Every other day, Oral  verapamiL CR tablet 180 mg, Daily, Oral  labetaloL injection 20 mg, Every 6 hours PRN, Intravenous    Plan  - BP is controlled, no changes needed to their regimen  - follow  Optimize pain control

## 2024-12-19 NOTE — NURSING
Tb skin test admin to left forearm, covid swab performed and taken to lab, pt donna well, bed low, call light within reach

## 2024-12-19 NOTE — SUBJECTIVE & OBJECTIVE
Interval History:  No acute events overnight    Review of Systems  Objective:     Vital Signs (Most Recent):  Temp: 97.9 °F (36.6 °C) (12/19/24 0756)  Pulse: 87 (12/19/24 0756)  Resp: 14 (12/19/24 1109)  BP: 122/80 (12/19/24 0756)  SpO2: 95 % (12/19/24 0756) Vital Signs (24h Range):  Temp:  [97.7 °F (36.5 °C)-99.6 °F (37.6 °C)] 97.9 °F (36.6 °C)  Pulse:  [] 87  Resp:  [14-18] 14  SpO2:  [89 %-96 %] 95 %  BP: (101-138)/(66-81) 122/80     Weight: 93.6 kg (206 lb 5.6 oz)  Body mass index is 36.55 kg/m².    Intake/Output Summary (Last 24 hours) at 12/19/2024 1112  Last data filed at 12/19/2024 1000  Gross per 24 hour   Intake --   Output 1250 ml   Net -1250 ml         Physical Exam  Vitals reviewed.   Constitutional:       Appearance: Normal appearance.   HENT:      Head: Normocephalic and atraumatic.      Nose: Nose normal.   Eyes:      Conjunctiva/sclera: Conjunctivae normal.   Neck:      Trachea: Trachea normal.   Cardiovascular:      Rate and Rhythm: Regular rhythm. Tachycardia present.      Pulses: Normal pulses.      Heart sounds: Normal heart sounds.   Pulmonary:      Effort: Pulmonary effort is normal.      Breath sounds: Normal breath sounds and air entry.   Abdominal:      General: Bowel sounds are normal.      Palpations: Abdomen is soft.   Musculoskeletal:      Cervical back: Neck supple.      Comments: Redness and tenderness in the left hand   Skin:     General: Skin is warm and dry.   Neurological:      Mental Status: She is alert. Mental status is at baseline.      Comments: Grossly normal motor and sensory function without focal deficit appreciated.   Psychiatric:         Mood and Affect: Mood and affect normal.         Behavior: Behavior is cooperative.             Significant Labs: All pertinent labs within the past 24 hours have been reviewed.    Significant Imaging: I have reviewed all pertinent imaging results/findings within the past 24 hours.

## 2024-12-20 VITALS
DIASTOLIC BLOOD PRESSURE: 82 MMHG | BODY MASS INDEX: 36.64 KG/M2 | HEART RATE: 87 BPM | HEIGHT: 63 IN | RESPIRATION RATE: 18 BRPM | WEIGHT: 206.81 LBS | OXYGEN SATURATION: 95 % | TEMPERATURE: 98 F | SYSTOLIC BLOOD PRESSURE: 134 MMHG

## 2024-12-20 PROCEDURE — 99239 HOSP IP/OBS DSCHRG MGMT >30: CPT | Mod: ,,, | Performed by: STUDENT IN AN ORGANIZED HEALTH CARE EDUCATION/TRAINING PROGRAM

## 2024-12-20 RX ORDER — PREDNISONE 20 MG/1
40 TABLET ORAL DAILY
Start: 2024-12-20

## 2024-12-20 NOTE — PLAN OF CARE
[facility] - [facility] Telemetry  Discharge Final Note    Primary Care Provider: Rishi Appiah MD    Expected Discharge Date: 12/20/2024    Final Discharge Note (most recent)       Final Note - 12/20/24 1004          Final Note    Assessment Type Final Discharge Note     Anticipated Discharge Disposition Skilled Nursing Facility        Post-Acute Status    Post-Acute Authorization Placement     Post-Acute Placement Status Set-up Complete/Auth obtained     Patient choice form signed by patient/caregiver List with quality metrics by geographic area provided;List from CMS Compare;List from System Post-Acute Care     Discharge Delays None known at this time                 Pt d/c to [facility]    Important Message from Medicare  Important Message from Medicare regarding Discharge Appeal Rights: Given to patient/caregiver, Explained to patient/caregiver, Signed/date by patient/caregiver     Date IMM was signed: 12/19/24  Time IMM was signed: 0858     Follow-up providers       Rishi Appiah MD   Specialty: Family Medicine   Relationship: PCP - General    [address]  [place] [address]   Phone: [phone]       Next Steps: Schedule an appointment as soon as possible for a visit in 1 month(s)    Mihai Nielsen MD   Specialty: Orthopedic Surgery    [address]   [address]  Mihai Nielsen Md, Orthopedic [facility], [facility] [place] [address]   Phone: [phone]       Next Steps: Follow up in 2 week(s)              After-discharge care                Destination       [facility]   Service: Skilled Nursing    [address] [facility] [place] [address]   Phone: [phone]

## 2024-12-20 NOTE — DISCHARGE SUMMARY
[facility] - [facility] Medicine  Discharge Summary      Patient Name: Whitley Millard  MRN: [id number]  FABRIZIO: [id number]  Patient Class: IP- Inpatient  Admission Date: 12/15/2024  Hospital Length of Stay: 5 days  Discharge Date and Time:  12/20/2024 8:00 AM  Attending Physician: Sudheer Foss DO   Discharging Provider: Sudheer Foss DO  Primary Care Provider: Rishi Appiah MD    Primary Care Team: Networked reference to record PCT     HPI:   Ms. Millard is a pleasant 89-year-old white female with a past medical history of chronic UTI, nephrolithiasis, hypertension, macular degeneration who presents to the ED after a mechanical fall at home.  Ms. Millard was attempting to go to the bathroom and was able to make it there but on her way back was not able make it back to her bed and fell.  She experienced extreme pain at that time.  Imaging in the ED revealed fibula fracture and periprosthetic tibial fracture.  Her only complaint at the time my interview was pain.  She denies any loss of consciousness.  She denies tripping over anything but says her feet stick when she attempts to walk longer distances and this caused her to fall.  She denies any fever, chills, shortness of breath chest pain nausea vomiting diarrhea.    * No surgery found *      Hospital Course:   12/16 PTOT eval today  12/17-continue therapy  12/18 plan for discharge to swing bed tomorrow  12/19 having some left wrist pain today  12/20 stable for discharge.  We will send with prednisone for gout.  Once symptoms resolve can slowly taper off.  Follow up with Dr. Nielsen for repeat films in 1-2 weeks     Goals of Care Treatment Preferences:  Code Status: Full Code      SDOH Screening:  The patient was screened for utility difficulties, food insecurity, transport difficulties, housing insecurity, and interpersonal safety and there were no concerns identified this admission.     Consults:   Consults (From admission,  onward)          Status Ordering Provider     Inpatient consult to Social Work  Once        Provider:  (Not yet assigned)    Completed EVELIO GAUTHIER     Inpatient consult to Orthopedics  Once        Provider:  Mihai Nielsen MD    Acknowledged EVELIO GAUTHIER            * Periprosthetic fracture of proximal end of tibia  NPO at midnight for evaluation by Orthopedic surgery in the morning  Can eat non op    Hand pain  Does not appear to have a fracture.  Red swollen wrist and metacarpophalangeal joint of the 3rd finger.  Checking uric acid.  We will start prednisone due to age.  Concern with side effects of long-term NSAID use.  We will not use colchicine with verapamil.  Suspect gout given elevated uric acid and acute onset with redness and swelling.  No risk factors for septic joint.  Have started prednisone.  Once symptoms improve for 48 hours can begin to slowly wean prednisone.    Fibula fracture  NPO at midnight for evaluation by ortho surgery in the morning  Can eat non op    Fall  PTOT  Can't walk after fracture, PTOT    Essential hypertension  Patient's blood pressure range in the last 24 hours was: BP  Min: 117/72  Max: 134/82.The patient's inpatient anti-hypertensive regimen is listed below:  Current Antihypertensives  furosemide tablet 20 mg, Every other day, Oral  verapamiL CR tablet 180 mg, Daily, Oral  labetaloL injection 20 mg, Every 6 hours PRN, Intravenous    Plan  - BP is controlled, no changes needed to their regimen  - follow  Optimize pain control    GERD (gastroesophageal reflux disease)  Continue PPI        Final Active Diagnoses:    Diagnosis Date Noted POA    PRINCIPAL PROBLEM:  Periprosthetic fracture of proximal end of tibia [M97.8XXA, Z96.659] 12/15/2024 Not Applicable     Chronic    Hand pain [M79.643] 12/19/2024 Yes    Fall [W19.XXXA] 12/15/2024 Yes    Fibula fracture [S82.409A] 12/15/2024 Yes     Chronic    Essential hypertension [I10] 08/26/2021 Yes    GERD (gastroesophageal reflux  disease) [K21.9] 08/26/2021 Yes      Problems Resolved During this Admission:    Diagnosis Date Noted Date Resolved POA    Tibia fracture [S82.209A] 12/15/2024 12/15/2024 Unknown       Discharged Condition: good    Disposition: Home or Self Care    Follow Up:   Follow-up Information       Rishi Appiah MD. Schedule an appointment as soon as possible for a visit in 1 month(s).    Specialty: Family Medicine  Contact information:  [address]  [place] [address]  [phone]               Mihai Nielsen MD Follow up in 2 week(s).    Specialty: Orthopedic Surgery  Contact information:  [address]   [address]  Mihai Nielsen Md, Orthopedic & Sports Medicine, [facility] [place] [address]  [phone]                           Patient Instructions:      Diet Adult Regular     Activity as tolerated       Significant Diagnostic Studies: Labs: All labs within the past 24 hours have been reviewed    Pending Diagnostic Studies:       None           Medications:  Reconciled Home Medications:      Medication List        START taking these medications      predniSONE 20 MG tablet  Commonly known as: DELTASONE  Take 2 tablets (40 mg total) by mouth once daily.            CONTINUE taking these medications      cetirizine 10 MG tablet  Commonly known as: ZYRTEC  Take 10 mg by mouth daily as needed.     esomeprazole 40 MG capsule  Commonly known as: NEXIUM  TAKE 1 CAPSULE BY MOUTH EVERY DAY BEFORE BREAKFAST     furosemide 20 MG tablet  Commonly known as: LASIX  Take 20 mg by mouth every other day.     methenamine 1 gram Tab  Commonly known as: HIPREX  TAKE 1 TABLET BY MOUTH TWICE DAILY     multivitamin per tablet  Commonly known as: THERAGRAN  Take 1 tablet by mouth once daily.     oxybutynin 15 MG Tr24  Commonly known as: DITROPAN XL  Take 15 mg by mouth once daily.     verapamiL 180 MG CR tablet  Commonly known as: CALAN-SR  Take 180 mg by mouth once daily.              Indwelling Lines/Drains at time of discharge:    Lines/Drains/Airways       Drain  Duration             Female External Urinary Catheter w/ Suction 12/15/24 1610 4 days                    Time spent on the discharge of patient: 35 minutes         Sudheer Foss DO  Department of Hospital Medicine  [facility] - [address] [place] [address]

## 2024-12-20 NOTE — ASSESSMENT & PLAN NOTE
Patient's blood pressure range in the last 24 hours was: BP  Min: 117/72  Max: 134/82.The patient's inpatient anti-hypertensive regimen is listed below:  Current Antihypertensives  furosemide tablet 20 mg, Every other day, Oral  verapamiL CR tablet 180 mg, Daily, Oral  labetaloL injection 20 mg, Every 6 hours PRN, Intravenous    Plan  - BP is controlled, no changes needed to their regimen  - follow  Optimize pain control

## 2024-12-20 NOTE — DISCHARGE INSTRUCTIONS
Your hand pain is most likely from gout.  I have started you on prednisone for this.  Once your hand pain is better for 48 hours can reduce the dose and slowly taper off of the prednisone.

## 2024-12-20 NOTE — NURSING
[facility] @ this time, line removed and patients belongings returned.  Patient left floor via stretcher per [facility].

## 2024-12-21 LAB
BACTERIA BLD CULT: NORMAL
BACTERIA BLD CULT: NORMAL

## 2024-12-23 ENCOUNTER — TELEPHONE (OUTPATIENT)
Dept: ORTHOPEDICS | Facility: CLINIC | Age: 89
End: 2024-12-23
Payer: MEDICARE

## 2024-12-23 NOTE — TELEPHONE ENCOUNTER
----- Message from Osiris sent at 2024  3:17 PM CST -----  Regardin week follow up  Who Called: Whitley Millard    Caller is requesting assistance/information from provider's office.    Patient needs a 2 week follow up earliest appt 2025      Preferred Method of Contact: Phone Call    Best Call Back Number, if different: [phone]  Additional Information:

## 2025-01-02 ENCOUNTER — TELEPHONE (OUTPATIENT)
Dept: ORTHOPEDICS | Facility: CLINIC | Age: OVER 89
End: 2025-01-02
Payer: MEDICARE

## 2025-01-02 NOTE — TELEPHONE ENCOUNTER
----- Message from Keren sent at 1/2/2025 10:01 AM CST -----  Regarding: Earlier Time for Appt tomorrow  Who Called: [facility] is wondering if there is any way the patient could come earlier tomorrow for her appointment. She is coming via transport van and they have to be in [place] at 11.     Preferred Method of Contact: Phone Call  Patient's Preferred Phone Number on File: [phone]

## 2025-01-02 NOTE — TELEPHONE ENCOUNTER
Talked with Cayden at [facility] and let her know patient can come at 830am in the morning to accommodate transportation. Cayden verbalized understanding and was thankful.

## 2025-01-03 ENCOUNTER — OFFICE VISIT (OUTPATIENT)
Dept: ORTHOPEDICS | Facility: CLINIC | Age: OVER 89
End: 2025-01-03
Payer: MEDICARE

## 2025-01-03 ENCOUNTER — HOSPITAL ENCOUNTER (OUTPATIENT)
Dept: RADIOLOGY | Facility: HOSPITAL | Age: OVER 89
Discharge: HOME | End: 2025-01-03
Attending: ORTHOPAEDIC SURGERY
Payer: MEDICARE

## 2025-01-03 VITALS — WEIGHT: 180 LBS | BODY MASS INDEX: 31.89 KG/M2 | HEIGHT: 63 IN

## 2025-01-03 DIAGNOSIS — M89.8X6 PAIN IN LEFT TIBIA: Primary | ICD-10-CM

## 2025-01-03 DIAGNOSIS — Z96.659 PERIPROSTHETIC FRACTURE OF PROXIMAL END OF TIBIA, INITIAL ENCOUNTER: Primary | Chronic | ICD-10-CM

## 2025-01-03 DIAGNOSIS — M97.8XXA PERIPROSTHETIC FRACTURE OF PROXIMAL END OF TIBIA, INITIAL ENCOUNTER: Primary | Chronic | ICD-10-CM

## 2025-01-03 DIAGNOSIS — M89.8X6 PAIN IN LEFT TIBIA: ICD-10-CM

## 2025-01-03 PROCEDURE — 73590 X-RAY EXAM OF LOWER LEG: CPT | Mod: TC,LT

## 2025-01-03 PROCEDURE — 73590 X-RAY EXAM OF LOWER LEG: CPT | Mod: 26,LT,, | Performed by: ORTHOPAEDIC SURGERY

## 2025-01-03 PROCEDURE — 99024 POSTOP FOLLOW-UP VISIT: CPT | Mod: ,,, | Performed by: ORTHOPAEDIC SURGERY

## 2025-01-03 PROCEDURE — 99213 OFFICE O/P EST LOW 20 MIN: CPT | Mod: PBBFAC,25 | Performed by: ORTHOPAEDIC SURGERY

## 2025-01-03 PROCEDURE — 99999 PR PBB SHADOW E&M-EST. PATIENT-LVL III: CPT | Mod: PBBFAC,,, | Performed by: ORTHOPAEDIC SURGERY

## 2025-01-03 NOTE — PROGRESS NOTES
Patient is here follow-up of left tib-fib fracture.  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  She is continuing to be toe-touch weight-bearing for the next 2 weeks then she can start weightbear as tolerates.  She is 4 weeks out from her injury I will follow back up in a month with x-rays neurovascularly intact distally

## 2025-01-03 NOTE — PROGRESS NOTES
Radiology Interpretation        Patient Name: Whitley Millard  Date: 1/3/2025  YOB: 1933  MRN# [id number]        ORDERING DIAGNOSIS:  No diagnosis found.     Two views AP lateral left tib-fib skeletally mature individual there was nondisplaced fracture of the proximal tibia total knee arthroplasty in place no loosening no shift alignment impression fracture proximal tibia nondisplaced with fibula fracture proximal shaft               Mihai Nielsen MD

## 2025-01-22 ENCOUNTER — TELEPHONE (OUTPATIENT)
Dept: NEUROLOGY | Facility: CLINIC | Age: OVER 89
End: 2025-01-22
Payer: MEDICARE

## 2025-01-30 ENCOUNTER — TELEPHONE (OUTPATIENT)
Dept: NEUROLOGY | Facility: CLINIC | Age: OVER 89
End: 2025-01-30
Payer: MEDICARE

## 2025-01-30 ENCOUNTER — HOSPITAL ENCOUNTER (OUTPATIENT)
Dept: RADIOLOGY | Facility: HOSPITAL | Age: OVER 89
Discharge: HOME | End: 2025-01-30
Attending: NURSE PRACTITIONER
Payer: MEDICARE

## 2025-01-30 ENCOUNTER — OFFICE VISIT (OUTPATIENT)
Dept: NEUROLOGY | Facility: CLINIC | Age: OVER 89
End: 2025-01-30
Payer: MEDICARE

## 2025-01-30 VITALS
SYSTOLIC BLOOD PRESSURE: 148 MMHG | HEART RATE: 98 BPM | HEIGHT: 63 IN | DIASTOLIC BLOOD PRESSURE: 96 MMHG | BODY MASS INDEX: 33.31 KG/M2 | OXYGEN SATURATION: 95 % | WEIGHT: 188 LBS

## 2025-01-30 DIAGNOSIS — E53.8 VITAMIN B12 DEFICIENCY: ICD-10-CM

## 2025-01-30 DIAGNOSIS — E03.9 HYPOTHYROIDISM, UNSPECIFIED TYPE: ICD-10-CM

## 2025-01-30 DIAGNOSIS — E55.9 VITAMIN D DEFICIENCY: ICD-10-CM

## 2025-01-30 DIAGNOSIS — R41.89 COGNITIVE IMPAIRMENT: ICD-10-CM

## 2025-01-30 DIAGNOSIS — R41.3 OTHER AMNESIA: ICD-10-CM

## 2025-01-30 DIAGNOSIS — R41.3 OTHER AMNESIA: Primary | ICD-10-CM

## 2025-01-30 PROCEDURE — 99215 OFFICE O/P EST HI 40 MIN: CPT | Mod: PBBFAC,25 | Performed by: NURSE PRACTITIONER

## 2025-01-30 PROCEDURE — 99203 OFFICE O/P NEW LOW 30 MIN: CPT | Mod: S$PBB,,, | Performed by: NURSE PRACTITIONER

## 2025-01-30 PROCEDURE — 99999 PR PBB SHADOW E&M-EST. PATIENT-LVL V: CPT | Mod: PBBFAC,,, | Performed by: NURSE PRACTITIONER

## 2025-01-30 PROCEDURE — 70450 CT HEAD/BRAIN W/O DYE: CPT | Mod: 26,,, | Performed by: RADIOLOGY

## 2025-01-30 PROCEDURE — 70450 CT HEAD/BRAIN W/O DYE: CPT | Mod: TC

## 2025-01-30 RX ORDER — DICLOFENAC SODIUM 10 MG/G
2 GEL TOPICAL DAILY
COMMUNITY

## 2025-01-30 RX ORDER — ACETAMINOPHEN 325 MG/1
325 TABLET ORAL EVERY 6 HOURS PRN
COMMUNITY

## 2025-01-30 RX ORDER — POLYETHYLENE GLYCOL 3350 17 G/17G
POWDER, FOR SOLUTION ORAL
COMMUNITY

## 2025-01-30 RX ORDER — ONDANSETRON 4 MG/1
4 TABLET, FILM COATED ORAL EVERY 8 HOURS PRN
COMMUNITY

## 2025-01-30 NOTE — TELEPHONE ENCOUNTER
Received call from Florinda in lab she states pt's K+  is 2.6. I fernando Reyes NP of the K+ level.

## 2025-01-30 NOTE — PROGRESS NOTES
Subjective:       Patient ID: Whitley Millard is a 89 y.o. female     Chief Complaint:    Chief Complaint   Patient presents with    Memory Loss        Allergies:  Codeine    Current Medications:    Outpatient Encounter Medications as of 1/30/2025   Medication Sig Dispense Refill    acetaminophen (TYLENOL) 325 MG tablet Take 325 mg by mouth every 6 (six) hours as needed for Pain.      cetirizine (ZYRTEC) 10 MG tablet Take 10 mg by mouth daily as needed.       diclofenac sodium (VOLTAREN ARTHRITIS PAIN) 1 % Gel Apply 2 g topically once daily.      esomeprazole (NEXIUM) 40 MG capsule TAKE 1 CAPSULE BY MOUTH EVERY DAY BEFORE BREAKFAST 90 capsule 1    furosemide (LASIX) 20 MG tablet Take 20 mg by mouth every other day.      methenamine (HIPREX) 1 gram Tab TAKE 1 TABLET BY MOUTH TWICE DAILY 180 tablet 3    multivitamin (THERAGRAN) per tablet Take 1 tablet by mouth once daily.      ondansetron (ZOFRAN) 4 MG tablet Take 4 mg by mouth every 8 (eight) hours as needed for Nausea.      oxybutynin (DITROPAN XL) 15 MG TR24 Take 15 mg by mouth once daily.      polyethylene glycol (GLYCOLAX) 17 gram PwPk Take by mouth.      verapamiL (CALAN-SR) 180 MG CR tablet Take 180 mg by mouth once daily.      predniSONE (DELTASONE) 20 MG tablet Take 2 tablets (40 mg total) by mouth once daily. (Patient not taking: Reported on 1/30/2025)       No facility-administered encounter medications on file as of 1/30/2025.       History of Present Illness  89 y/o female new referral to neurology for reported complications with memory, concern about dementia.    Her daughter in law is present in clinic with her today, patient is not able to provide any history.  She was not able to conduct the MMSE, though it would appear it was more due to not wishing to participate.  During my evaluation she was interactive, answering questions.  She does report noting herself that she has noted symptoms of forgetfulness, losing items, having more difficulty  staying on task and has noted people have reported she may repeat conversations.  She is currently at NH for rehab after a fall, but before that she was living independently at home with a nurse coming by 3x week.    She is able to follow simple commands in clinic, she could tell me she was at [facility], but was not able to tell day of the week or month.  She was oriented to self.    She is in agreement for CT head to ensure no prior CVA and to evaluate atrophy and also will obtain dementia panel labs.  She is not in agreement to start new medications currently               Review of Systems  Review of Systems   Constitutional:  Negative for diaphoresis and fever.   HENT:  Negative for congestion, hearing loss and tinnitus.    Eyes:  Negative for blurred vision, double vision, photophobia, discharge and redness.   Respiratory:  Negative for cough and shortness of breath.    Cardiovascular:  Negative for chest pain.   Gastrointestinal:  Negative for abdominal pain, nausea and vomiting.   Musculoskeletal:  Negative for back pain, joint pain, myalgias and neck pain.   Skin:  Negative for itching and rash.   Neurological:  Negative for dizziness, tremors, sensory change, speech change, focal weakness, seizures, loss of consciousness, weakness and headaches.   Psychiatric/Behavioral:  Positive for memory loss. Negative for depression and hallucinations. The patient does not have insomnia.    All other systems reviewed and are negative.     Objective:     NEUROLOGICAL EXAMINATION:     MENTAL STATUS   Oriented to person, place, and time.   Registration: recalls 2 of 3 objects. Recall at 5 minutes: recalls 1 of 3 objects.   Attention: decreased. Concentration: decreased.   Speech: speech is normal   Level of consciousness: alert  Knowledge: poor and inconsistent with education. Unable to perform simple calculations.   Normal comprehension. Praxis: abnormal     CRANIAL NERVES     CN II   Visual fields full to confrontation.    Visual acuity: normal  Right visual field deficit: none  Left visual field deficit: none     CN III, IV, VI   Pupils are equal, round, and reactive to light.  Extraocular motions are normal.   Right pupil: Size: 3 mm. Shape: regular. Reactivity: brisk. Consensual response: intact. Accommodation: intact.   Left pupil: Size: 3 mm. Shape: regular. Reactivity: brisk. Consensual response: intact. Accommodation: intact.   CN III: no CN III palsy  CN VI: no CN VI palsy  Nystagmus: none   Diplopia: none  Upgaze: normal  Downgaze: normal  Conjugate gaze: present  Vestibulo-ocular reflex: present    CN V   Facial sensation intact.   Right facial sensation deficit: none  Left facial sensation deficit: none  Right corneal reflex: normal  Left corneal reflex: normal    CN VII   Facial expression full, symmetric.   Right facial weakness: none  Left facial weakness: none  Right taste: normal  Left taste: normal    CN VIII   CN VIII normal.   Hearing: intact    CN IX, X   CN IX normal.   CN X normal.   Palate: symmetric    CN XI   CN XI normal.   Right sternocleidomastoid strength: normal  Left sternocleidomastoid strength: normal  Right trapezius strength: normal  Left trapezius strength: normal    CN XII   CN XII normal.   Tongue: not atrophic  Fasciculations: absent  Tongue deviation: none    MOTOR EXAM   Muscle bulk: normal  Overall muscle tone: normal  Right arm tone: normal  Left arm tone: normal  Right arm pronator drift: absent  Left arm pronator drift: absent  Right leg tone: normal  Left leg tone: normal    Strength   Right neck flexion: 5/5  Left neck flexion: 5/5  Right neck extension: 5/5  Left neck extension: 5/5  Right deltoid: 5/5  Left deltoid: 5/5  Right biceps: 5/5  Left biceps: 5/5  Right triceps: 5/5  Left triceps: 5/5  Right wrist flexion: 5/5  Left wrist flexion: 5/5  Right wrist extension: 5/5  Left wrist extension: 5/5  Right interossei: 5/5  Left interossei: 5/5  Right iliopsoas: 5/5  Left iliopsoas:  5/5  Right quadriceps: 5/5  Left quadriceps: 5/5  Right hamstrin/5  Left hamstrin/5  Right anterior tibial: 5/5  Left anterior tibial: 5/5  Right posterior tibial: 5/5  Left posterior tibial: 5/5  Right gastroc: 5/5  Left gastroc: 5/5    REFLEXES     Reflexes   Right brachioradialis: 2+  Left brachioradialis: 2+  Right biceps: 2+  Left biceps: 2+  Right triceps: 2+  Left triceps: 2+  Right patellar: 2+  Left patellar: 2+  Right achilles: 2+  Left achilles: 2+  Right plantar: normal  Left plantar: normal  Right García: absent  Left García: absent  Right ankle clonus: absent  Left ankle clonus: absent  Right pendular knee jerk: absent  Left pendular knee jerk: absent    SENSORY EXAM   Light touch normal.   Right arm light touch: normal  Left arm light touch: normal  Right leg light touch: normal  Left leg light touch: normal  Vibration normal.   Right arm vibration: normal  Left arm vibration: normal  Right leg vibration: normal  Left leg vibration: normal  Proprioception normal.   Right arm proprioception: normal  Left arm proprioception: normal  Right leg proprioception: normal  Left leg proprioception: normal  Pinprick normal.   Right arm pinprick: normal  Left arm pinprick: normal  Right leg pinprick: normal  Left leg pinprick: normal  Graphesthesia: normal  Romberg: negative  Stereognosis: normal    GAIT AND COORDINATION      Coordination   Finger to nose coordination: normal  Heel to shin coordination: normal  Tandem walking coordination: abnormal    Tremor   Resting tremor: absent  Intention tremor: absent  Action tremor: absent       Using wheelchair in clinic, recent fracture of tib/fib        Physical Exam  Vitals and nursing note reviewed.   Constitutional:       Appearance: Normal appearance.   HENT:      Head: Normocephalic.   Eyes:      Extraocular Movements: Extraocular movements intact and EOM normal.      Pupils: Pupils are equal, round, and reactive to light.   Pulmonary:      Effort:  Pulmonary effort is normal.   Musculoskeletal:         General: No swelling or tenderness. Normal range of motion.      Cervical back: Normal range of motion and neck supple.      Right lower leg: No edema.      Left lower leg: No edema.   Skin:     General: Skin is warm and dry.      Coloration: Skin is not jaundiced.      Findings: No rash.   Neurological:      General: No focal deficit present.      Mental Status: She is alert and oriented to person, place, and time.      GCS: GCS eye subscore is 4. GCS verbal subscore is 5. GCS motor subscore is 6.      Cranial Nerves: No cranial nerve deficit.      Sensory: No sensory deficit.      Motor: Motor function is intact. No weakness.      Coordination: Coordination is intact. Coordination normal. Finger-Nose-Finger Test, Heel to Shin Test and Romberg Test normal.      Gait: Gait abnormal and tandem walk abnormal.      Deep Tendon Reflexes: Reflexes normal.      Reflex Scores:       Tricep reflexes are 2+ on the right side and 2+ on the left side.       Bicep reflexes are 2+ on the right side and 2+ on the left side.       Brachioradialis reflexes are 2+ on the right side and 2+ on the left side.       Patellar reflexes are 2+ on the right side and 2+ on the left side.       Achilles reflexes are 2+ on the right side and 2+ on the left side.  Psychiatric:         Attention and Perception: She is inattentive.         Mood and Affect: Mood normal.         Speech: Speech normal.         Behavior: Behavior normal. Behavior is cooperative.         Cognition and Memory: Cognition is impaired. Memory is impaired.          Assessment:     Problem List Items Addressed This Visit    None  Visit Diagnoses       Other amnesia    -  Primary    Relevant Orders    CT Head Without Contrast    Cognitive impairment        Relevant Orders    CBC Auto Differential    Comprehensive Metabolic Panel    Vitamin B12 deficiency        Relevant Orders    Folate    Vitamin B12    Hypothyroidism,  unspecified type        Relevant Orders    TSH    Vitamin D deficiency        Relevant Orders    Vitamin D             Primary Diagnosis and ICD10  Other amnesia [R41.3]    Plan:     There are no Patient Instructions on file for this visit.    There are no discontinued medications.    Requested Prescriptions      No prescriptions requested or ordered in this encounter       Orders Placed This Encounter   Procedures    CT Head Without Contrast    CBC Auto Differential    Comprehensive Metabolic Panel    Folate    TSH    Vitamin B12    Vitamin D

## 2025-02-05 ENCOUNTER — HOSPITAL ENCOUNTER (OUTPATIENT)
Dept: RADIOLOGY | Facility: HOSPITAL | Age: OVER 89
Discharge: HOME | End: 2025-02-05
Attending: ORTHOPAEDIC SURGERY
Payer: MEDICARE

## 2025-02-05 ENCOUNTER — OFFICE VISIT (OUTPATIENT)
Dept: ORTHOPEDICS | Facility: CLINIC | Age: OVER 89
End: 2025-02-05
Payer: MEDICARE

## 2025-02-05 VITALS
HEIGHT: 65 IN | BODY MASS INDEX: 29.99 KG/M2 | OXYGEN SATURATION: 96 % | HEART RATE: 107 BPM | WEIGHT: 180 LBS | DIASTOLIC BLOOD PRESSURE: 92 MMHG | SYSTOLIC BLOOD PRESSURE: 126 MMHG

## 2025-02-05 DIAGNOSIS — Z96.659 PERIPROSTHETIC FRACTURE OF PROXIMAL END OF TIBIA, INITIAL ENCOUNTER: ICD-10-CM

## 2025-02-05 DIAGNOSIS — M97.8XXA PERIPROSTHETIC FRACTURE OF PROXIMAL END OF TIBIA, INITIAL ENCOUNTER: Primary | ICD-10-CM

## 2025-02-05 DIAGNOSIS — Z96.659 PERIPROSTHETIC FRACTURE OF PROXIMAL END OF TIBIA, INITIAL ENCOUNTER: Primary | ICD-10-CM

## 2025-02-05 DIAGNOSIS — M97.8XXA PERIPROSTHETIC FRACTURE OF PROXIMAL END OF TIBIA, INITIAL ENCOUNTER: ICD-10-CM

## 2025-02-05 PROCEDURE — 73590 X-RAY EXAM OF LOWER LEG: CPT | Mod: TC,LT

## 2025-02-05 PROCEDURE — 73590 X-RAY EXAM OF LOWER LEG: CPT | Mod: 26,LT,, | Performed by: ORTHOPAEDIC SURGERY

## 2025-02-05 PROCEDURE — 99999 PR PBB SHADOW E&M-EST. PATIENT-LVL IV: CPT | Mod: PBBFAC,,, | Performed by: ORTHOPAEDIC SURGERY

## 2025-02-05 PROCEDURE — 99024 POSTOP FOLLOW-UP VISIT: CPT | Mod: ,,, | Performed by: ORTHOPAEDIC SURGERY

## 2025-02-05 PROCEDURE — 99214 OFFICE O/P EST MOD 30 MIN: CPT | Mod: PBBFAC,25 | Performed by: ORTHOPAEDIC SURGERY

## 2025-02-05 NOTE — PROGRESS NOTES
Radiology Interpretation        Patient Name: Whitley Millard  Date: 2/5/2025  YOB: 1933  MRN# [id number]        ORDERING DIAGNOSIS:    Encounter Diagnosis   Name Primary?    Periprosthetic fracture of proximal end of tibia, initial encounter Yes           Two views left knee skeletally mature individual there is total knee arthroplasty in place there is proximal proximal tibia and fibular shaft with healing callus present no displacement no bony lesions impression healing fracture left tibia no loosening of the hardware of the total            Mihai Nielsen MD

## 2025-02-05 NOTE — PROGRESS NOTES
Patient is here follow-up of left tibia fracture.  X-rays show heavy callus present.  I am let her weightbear as tolerates.  No loosening of the total knee.  I will follow back up in 4 weeks.  She can weightbear as tolerated in a walker.  Neurovascularly she is intact distally.

## 2025-02-13 RX ORDER — SERTRALINE HYDROCHLORIDE 50 MG/1
50 TABLET, FILM COATED ORAL DAILY
Qty: 30 TABLET | Refills: 11 | Status: SHIPPED | OUTPATIENT
Start: 2025-02-13 | End: 2026-02-13

## 2025-02-26 ENCOUNTER — HOSPITAL ENCOUNTER (INPATIENT)
Facility: HOSPITAL | Age: OVER 89
LOS: 1 days | Discharge: NURSING, REHAB OR LONG-TERM CARE | DRG: 689 | End: 2025-02-28
Attending: EMERGENCY MEDICINE | Admitting: STUDENT IN AN ORGANIZED HEALTH CARE EDUCATION/TRAINING PROGRAM
Payer: MEDICARE

## 2025-02-26 DIAGNOSIS — R41.82 AMS (ALTERED MENTAL STATUS): ICD-10-CM

## 2025-02-26 DIAGNOSIS — N39.0 URINARY TRACT INFECTION WITHOUT HEMATURIA, SITE UNSPECIFIED: Primary | ICD-10-CM

## 2025-02-26 DIAGNOSIS — R07.9 CHEST PAIN: ICD-10-CM

## 2025-02-26 PROBLEM — G93.41 ENCEPHALOPATHY, METABOLIC: Status: ACTIVE | Noted: 2025-02-26

## 2025-02-26 LAB
ALBUMIN SERPL BCP-MCNC: 3.1 G/DL (ref 3.4–4.8)
ALBUMIN/GLOB SERPL: 0.8 {RATIO}
ALP SERPL-CCNC: 142 U/L (ref 40–150)
ALT SERPL W P-5'-P-CCNC: 24 U/L
ANION GAP SERPL CALCULATED.3IONS-SCNC: 15 MMOL/L (ref 7–16)
AST SERPL W P-5'-P-CCNC: 30 U/L (ref 5–34)
BACTERIA #/AREA URNS HPF: ABNORMAL /HPF
BASOPHILS # BLD AUTO: 0.02 K/UL (ref 0–0.2)
BASOPHILS NFR BLD AUTO: 0.2 % (ref 0–1)
BILIRUB SERPL-MCNC: 0.9 MG/DL
BILIRUB UR QL STRIP: NEGATIVE
BUN SERPL-MCNC: 17 MG/DL (ref 10–20)
BUN/CREAT SERPL: 21 (ref 6–20)
CALCIUM SERPL-MCNC: 10.4 MG/DL (ref 8.4–10.2)
CHLORIDE SERPL-SCNC: 105 MMOL/L (ref 98–111)
CLARITY UR: ABNORMAL
CO2 SERPL-SCNC: 23 MMOL/L (ref 23–31)
COLOR UR: YELLOW
CREAT SERPL-MCNC: 0.82 MG/DL (ref 0.55–1.02)
DIFFERENTIAL METHOD BLD: ABNORMAL
EGFR (NO RACE VARIABLE) (RUSH/TITUS): 68 ML/MIN/1.73M2
EOSINOPHIL # BLD AUTO: 0.35 K/UL (ref 0–0.5)
EOSINOPHIL NFR BLD AUTO: 3.7 % (ref 1–4)
ERYTHROCYTE [DISTWIDTH] IN BLOOD BY AUTOMATED COUNT: 15.1 % (ref 11.5–14.5)
GLOBULIN SER-MCNC: 3.9 G/DL (ref 2–4)
GLUCOSE SERPL-MCNC: 97 MG/DL (ref 75–121)
GLUCOSE UR STRIP-MCNC: NORMAL MG/DL
HCT VFR BLD AUTO: 42.8 % (ref 38–47)
HGB BLD-MCNC: 13.7 G/DL (ref 12–16)
IMM GRANULOCYTES # BLD AUTO: 0.03 K/UL (ref 0–0.04)
IMM GRANULOCYTES NFR BLD: 0.3 % (ref 0–0.4)
KETONES UR STRIP-SCNC: NEGATIVE MG/DL
LEUKOCYTE ESTERASE UR QL STRIP: ABNORMAL
LYMPHOCYTES # BLD AUTO: 1.3 K/UL (ref 1–4.8)
LYMPHOCYTES NFR BLD AUTO: 13.7 % (ref 27–41)
MAGNESIUM SERPL-MCNC: 2.1 MG/DL (ref 1.6–2.6)
MCH RBC QN AUTO: 28.2 PG (ref 27–31)
MCHC RBC AUTO-ENTMCNC: 32 G/DL (ref 32–36)
MCV RBC AUTO: 88.1 FL (ref 80–96)
MONOCYTES # BLD AUTO: 0.61 K/UL (ref 0–0.8)
MONOCYTES NFR BLD AUTO: 6.4 % (ref 2–6)
MPC BLD CALC-MCNC: 10.6 FL (ref 9.4–12.4)
MUCOUS, UA: ABNORMAL /LPF
NEUTROPHILS # BLD AUTO: 7.21 K/UL (ref 1.8–7.7)
NEUTROPHILS NFR BLD AUTO: 75.7 % (ref 53–65)
NITRITE UR QL STRIP: NEGATIVE
NRBC # BLD AUTO: 0 X10E3/UL
NRBC, AUTO (.00): 0 %
PH UR STRIP: 6.5 PH UNITS
PLATELET # BLD AUTO: 265 K/UL (ref 150–400)
POTASSIUM SERPL-SCNC: 4.2 MMOL/L (ref 3.5–5.1)
PROT SERPL-MCNC: 7 G/DL (ref 5.8–7.6)
PROT UR QL STRIP: 20
RBC # BLD AUTO: 4.86 M/UL (ref 4.2–5.4)
RBC # UR STRIP: ABNORMAL /UL
RBC #/AREA URNS HPF: 3 /HPF
SODIUM SERPL-SCNC: 139 MMOL/L (ref 136–145)
SP GR UR STRIP: 1.01
SQUAMOUS #/AREA URNS LPF: ABNORMAL /HPF
TRANS CELLS #/AREA URNS LPF: ABNORMAL /LPF
TROPONIN I SERPL HS-MCNC: 3.4 NG/L
TSH SERPL DL<=0.005 MIU/L-ACNC: 1.32 UIU/ML (ref 0.35–4.94)
UROBILINOGEN UR STRIP-ACNC: NORMAL MG/DL
WBC # BLD AUTO: 9.52 K/UL (ref 4.5–11)
WBC #/AREA URNS HPF: >182 /HPF
WBC CLUMPS, UA: ABNORMAL /HPF

## 2025-02-26 PROCEDURE — 81001 URINALYSIS AUTO W/SCOPE: CPT | Performed by: EMERGENCY MEDICINE

## 2025-02-26 PROCEDURE — G0378 HOSPITAL OBSERVATION PER HR: HCPCS

## 2025-02-26 PROCEDURE — 84484 ASSAY OF TROPONIN QUANT: CPT | Performed by: EMERGENCY MEDICINE

## 2025-02-26 PROCEDURE — 80053 COMPREHEN METABOLIC PANEL: CPT | Performed by: EMERGENCY MEDICINE

## 2025-02-26 PROCEDURE — 99223 1ST HOSP IP/OBS HIGH 75: CPT | Mod: AI,,, | Performed by: STUDENT IN AN ORGANIZED HEALTH CARE EDUCATION/TRAINING PROGRAM

## 2025-02-26 PROCEDURE — 36415 COLL VENOUS BLD VENIPUNCTURE: CPT | Performed by: EMERGENCY MEDICINE

## 2025-02-26 PROCEDURE — 85025 COMPLETE CBC W/AUTO DIFF WBC: CPT | Performed by: EMERGENCY MEDICINE

## 2025-02-26 PROCEDURE — 25000003 PHARM REV CODE 250: Performed by: STUDENT IN AN ORGANIZED HEALTH CARE EDUCATION/TRAINING PROGRAM

## 2025-02-26 PROCEDURE — 63600175 PHARM REV CODE 636 W HCPCS: Performed by: STUDENT IN AN ORGANIZED HEALTH CARE EDUCATION/TRAINING PROGRAM

## 2025-02-26 PROCEDURE — 84443 ASSAY THYROID STIM HORMONE: CPT | Performed by: STUDENT IN AN ORGANIZED HEALTH CARE EDUCATION/TRAINING PROGRAM

## 2025-02-26 PROCEDURE — 93005 ELECTROCARDIOGRAM TRACING: CPT

## 2025-02-26 PROCEDURE — 99285 EMERGENCY DEPT VISIT HI MDM: CPT | Mod: 25

## 2025-02-26 PROCEDURE — 87086 URINE CULTURE/COLONY COUNT: CPT | Performed by: EMERGENCY MEDICINE

## 2025-02-26 PROCEDURE — 96372 THER/PROPH/DIAG INJ SC/IM: CPT | Performed by: STUDENT IN AN ORGANIZED HEALTH CARE EDUCATION/TRAINING PROGRAM

## 2025-02-26 PROCEDURE — 93010 ELECTROCARDIOGRAM REPORT: CPT | Mod: ,,, | Performed by: STUDENT IN AN ORGANIZED HEALTH CARE EDUCATION/TRAINING PROGRAM

## 2025-02-26 PROCEDURE — 83735 ASSAY OF MAGNESIUM: CPT | Performed by: EMERGENCY MEDICINE

## 2025-02-26 PROCEDURE — 96374 THER/PROPH/DIAG INJ IV PUSH: CPT

## 2025-02-26 PROCEDURE — 63600175 PHARM REV CODE 636 W HCPCS: Performed by: EMERGENCY MEDICINE

## 2025-02-26 RX ORDER — NALOXONE HCL 0.4 MG/ML
0.02 VIAL (ML) INJECTION
Status: DISCONTINUED | OUTPATIENT
Start: 2025-02-26 | End: 2025-02-28 | Stop reason: HOSPADM

## 2025-02-26 RX ORDER — SODIUM CHLORIDE 9 MG/ML
INJECTION, SOLUTION INTRAVENOUS CONTINUOUS
Status: DISPENSED | OUTPATIENT
Start: 2025-02-26 | End: 2025-02-27

## 2025-02-26 RX ORDER — CEFTRIAXONE 2 G/1
2 INJECTION, POWDER, FOR SOLUTION INTRAMUSCULAR; INTRAVENOUS
Status: COMPLETED | OUTPATIENT
Start: 2025-02-26 | End: 2025-02-26

## 2025-02-26 RX ORDER — BUSPIRONE HYDROCHLORIDE 5 MG/1
5 TABLET ORAL 2 TIMES DAILY
COMMUNITY

## 2025-02-26 RX ORDER — PANTOPRAZOLE SODIUM 40 MG/1
40 TABLET, DELAYED RELEASE ORAL DAILY
Status: DISCONTINUED | OUTPATIENT
Start: 2025-02-27 | End: 2025-02-28 | Stop reason: HOSPADM

## 2025-02-26 RX ORDER — SERTRALINE HYDROCHLORIDE 25 MG/1
25 TABLET, FILM COATED ORAL DAILY
COMMUNITY

## 2025-02-26 RX ORDER — ACETAMINOPHEN 325 MG/1
325 TABLET ORAL EVERY 6 HOURS PRN
Status: DISCONTINUED | OUTPATIENT
Start: 2025-02-26 | End: 2025-02-28 | Stop reason: HOSPADM

## 2025-02-26 RX ORDER — SODIUM CHLORIDE 0.9 % (FLUSH) 0.9 %
10 SYRINGE (ML) INJECTION EVERY 12 HOURS PRN
Status: DISCONTINUED | OUTPATIENT
Start: 2025-02-26 | End: 2025-02-28 | Stop reason: HOSPADM

## 2025-02-26 RX ORDER — OXYBUTYNIN CHLORIDE 5 MG/1
15 TABLET, EXTENDED RELEASE ORAL DAILY
Status: DISCONTINUED | OUTPATIENT
Start: 2025-02-27 | End: 2025-02-28 | Stop reason: HOSPADM

## 2025-02-26 RX ORDER — GLUCAGON 1 MG
1 KIT INJECTION
Status: DISCONTINUED | OUTPATIENT
Start: 2025-02-26 | End: 2025-02-28 | Stop reason: HOSPADM

## 2025-02-26 RX ORDER — SERTRALINE HYDROCHLORIDE 25 MG/1
25 TABLET, FILM COATED ORAL DAILY
Status: DISCONTINUED | OUTPATIENT
Start: 2025-02-27 | End: 2025-02-27

## 2025-02-26 RX ORDER — LANOLIN ALCOHOL/MO/W.PET/CERES
400 CREAM (GRAM) TOPICAL DAILY
COMMUNITY

## 2025-02-26 RX ORDER — POTASSIUM CHLORIDE 20 MEQ/1
20 TABLET, EXTENDED RELEASE ORAL DAILY
COMMUNITY

## 2025-02-26 RX ORDER — CEFTRIAXONE 1 G/1
1 INJECTION, POWDER, FOR SOLUTION INTRAMUSCULAR; INTRAVENOUS
Status: DISCONTINUED | OUTPATIENT
Start: 2025-02-27 | End: 2025-02-28 | Stop reason: HOSPADM

## 2025-02-26 RX ORDER — VERAPAMIL HYDROCHLORIDE 180 MG/1
180 TABLET, EXTENDED RELEASE ORAL DAILY
Status: DISCONTINUED | OUTPATIENT
Start: 2025-02-27 | End: 2025-02-28 | Stop reason: HOSPADM

## 2025-02-26 RX ORDER — IBUPROFEN 200 MG
24 TABLET ORAL
Status: DISCONTINUED | OUTPATIENT
Start: 2025-02-26 | End: 2025-02-28 | Stop reason: HOSPADM

## 2025-02-26 RX ORDER — ENOXAPARIN SODIUM 100 MG/ML
40 INJECTION SUBCUTANEOUS EVERY 24 HOURS
Status: DISCONTINUED | OUTPATIENT
Start: 2025-02-26 | End: 2025-02-28 | Stop reason: HOSPADM

## 2025-02-26 RX ORDER — AMOXICILLIN 250 MG
2 CAPSULE ORAL NIGHTLY
COMMUNITY

## 2025-02-26 RX ORDER — IBUPROFEN 200 MG
16 TABLET ORAL
Status: DISCONTINUED | OUTPATIENT
Start: 2025-02-26 | End: 2025-02-28 | Stop reason: HOSPADM

## 2025-02-26 RX ORDER — ONDANSETRON HYDROCHLORIDE 2 MG/ML
4 INJECTION, SOLUTION INTRAVENOUS EVERY 8 HOURS PRN
Status: DISCONTINUED | OUTPATIENT
Start: 2025-02-26 | End: 2025-02-28 | Stop reason: HOSPADM

## 2025-02-26 RX ORDER — POLYETHYLENE GLYCOL 3350 17 G/17G
17 POWDER, FOR SOLUTION ORAL 2 TIMES DAILY PRN
Status: DISCONTINUED | OUTPATIENT
Start: 2025-02-26 | End: 2025-02-28 | Stop reason: HOSPADM

## 2025-02-26 RX ADMIN — SODIUM CHLORIDE: 9 INJECTION, SOLUTION INTRAVENOUS at 06:02

## 2025-02-26 RX ADMIN — CEFTRIAXONE SODIUM 2 G: 2 INJECTION, POWDER, FOR SOLUTION INTRAMUSCULAR; INTRAVENOUS at 02:02

## 2025-02-26 RX ADMIN — ENOXAPARIN SODIUM 40 MG: 40 INJECTION SUBCUTANEOUS at 06:02

## 2025-02-26 NOTE — ED PROVIDER NOTES
Encounter Date: 2/26/2025    SCRIBE #1 NOTE: I, Izabel Nix, am scribing for, and in the presence of,  Alex Dela Cruz MD. I have scribed the entire note.       History     Chief Complaint   Patient presents with    Altered Mental Status     This is a 89 y/o female,who presents to the ED via EMS with complaints of AMS and hypertension. Pt is alert, responsive and answering questions. There is no hx of CP, SOB, HA, dizziness, or syncopal episode. There are no other complaints/pain in the ED at this time. She has a known hx of HTN and GERD. There is no smoking hx on file.     The history is provided by the patient, the EMS personnel and a relative. No  was used.     Review of patient's allergies indicates:   Allergen Reactions    Codeine      Past Medical History:   Diagnosis Date    Cholelithiases     Essential hypertension 08/26/2021    GERD (gastroesophageal reflux disease)     Macular degeneration     Osteoarthrosis     Pulmonary hypertension 2021    RVSP  37 mmHg    Ureteral stone with hydronephrosis      Past Surgical History:   Procedure Laterality Date    CARPAL TUNNEL RELEASE Bilateral     FOOT SURGERY Left     TOTAL HIP ARTHROPLASTY Right     TOTAL KNEE ARTHROPLASTY Left     URETERORENOSCOPY Left 12/15/2021    Procedure: URETERORENOSCOPY WITH LASER LITHOTRIPSY AND INSERTION OF URETERAL STENT;  Surgeon: Hernando Hanson Jr., MD;  Location: [facility];  Service: Urology;  Laterality: Left;     Family History   Problem Relation Name Age of Onset    Stroke Father      Heart disease Sister      Cancer Son       Social History[1]  Review of Systems   Respiratory:  Negative for shortness of breath.    Cardiovascular:  Negative for chest pain.   Neurological:  Negative for dizziness, syncope and headaches.       Physical Exam     Initial Vitals [02/26/25 1334]   BP Pulse Resp Temp SpO2   (!) 153/80 75 18 98.2 °F (36.8 °C) 95 %      MAP       --         Physical Exam    Nursing note and  vitals reviewed.  Constitutional: She appears well-developed and well-nourished.   HENT:   Head: Normocephalic and atraumatic.   Eyes: Conjunctivae and EOM are normal. Pupils are equal, round, and reactive to light.   Neck: Neck supple.   Normal range of motion.  Cardiovascular:  Normal rate, regular rhythm, normal heart sounds and intact distal pulses.           Pulmonary/Chest: Breath sounds normal.   Abdominal: Abdomen is soft. Bowel sounds are normal.   Musculoskeletal:         General: Edema (Trace pitting edema noted bilaterally.) present. Normal range of motion.      Cervical back: Normal range of motion and neck supple.     Neurological: She is alert and oriented to person, place, and time. She has normal strength.   Skin: Skin is warm and dry. Capillary refill takes less than 2 seconds.   Psychiatric: She has a normal mood and affect. Thought content normal.         ED Course   Procedures  Labs Reviewed   CBC W/ AUTO DIFFERENTIAL    Narrative:     The following orders were created for panel order CBC auto differential.  Procedure                               Abnormality         Status                     ---------                               -----------         ------                     CBC with Differential[[id number]]                                                        Please view results for these tests on the individual orders.   COMPREHENSIVE METABOLIC PANEL   TROPONIN I   MAGNESIUM   URINALYSIS, REFLEX TO URINE CULTURE   CBC WITH DIFFERENTIAL          Imaging Results    None          Medications - No data to display  Medical Decision Making  Amount and/or Complexity of Data Reviewed  Labs: ordered.  Radiology: ordered.              Attending Attestation:           Physician Attestation for Scribe:  Physician Attestation Statement for Scribe #1: I, Alex Dela Cruz MD, reviewed documentation, as scribed by Izabel Nix in my presence, and it is both accurate and complete.                                     Clinical Impression:  Final diagnoses:  [R41.82] AMS (altered mental status)                   [1]   Social History  Tobacco Use    Smoking status: Never    Smokeless tobacco: Never   Substance Use Topics    Alcohol use: Never    Drug use: Never

## 2025-02-26 NOTE — ED NOTES
Patient had a BM on arrival. Changed diaper and placed in a new brief. UA obtained via straight cath

## 2025-02-26 NOTE — H&P
Ms. Millard is a 89-year-old white female with a past medical history of chronic UTI, nephrolithiasis, hypertension, macular degeneration who presents to the hospital from her swing bed facility with increased confusion.  She had a UTI approximately 2 weeks ago that was fully treated.  Urine in the ED today shows evidence of recurrent UTI.  She has had this happen in the past which was due to renal stones.  Subsequently had lithotripsy and passage of stones and resolution of recurrent UTIs.  She is very pleasant today but confused.  She has no complaints of the time of my interview.  Workup was unremarkable beyond likely UTI.    # metabolic encephalopathy   #UTI  #dehydration    We will gently hydrate overnight.  Encourage oral intake.  Continue Rocephin.  Her son was able to request her most recent culture results be faxed over from her swing bed facility.  We are awaiting those.  In the interim we will place her on Rocephin.      Full H and P to follow

## 2025-02-27 LAB
ANION GAP SERPL CALCULATED.3IONS-SCNC: 12 MMOL/L (ref 7–16)
BASOPHILS # BLD AUTO: 0.03 K/UL (ref 0–0.2)
BASOPHILS NFR BLD AUTO: 0.4 % (ref 0–1)
BUN SERPL-MCNC: 14 MG/DL (ref 10–20)
BUN/CREAT SERPL: 18 (ref 6–20)
CALCIUM SERPL-MCNC: 9.3 MG/DL (ref 8.4–10.2)
CHLORIDE SERPL-SCNC: 107 MMOL/L (ref 98–111)
CO2 SERPL-SCNC: 23 MMOL/L (ref 23–31)
CREAT SERPL-MCNC: 0.77 MG/DL (ref 0.55–1.02)
DIFFERENTIAL METHOD BLD: ABNORMAL
EGFR (NO RACE VARIABLE) (RUSH/TITUS): 73 ML/MIN/1.73M2
EOSINOPHIL # BLD AUTO: 0.31 K/UL (ref 0–0.5)
EOSINOPHIL NFR BLD AUTO: 3.9 % (ref 1–4)
ERYTHROCYTE [DISTWIDTH] IN BLOOD BY AUTOMATED COUNT: 15 % (ref 11.5–14.5)
GLUCOSE SERPL-MCNC: 86 MG/DL (ref 75–121)
HCT VFR BLD AUTO: 37.2 % (ref 38–47)
HGB BLD-MCNC: 12 G/DL (ref 12–16)
IMM GRANULOCYTES # BLD AUTO: 0.02 K/UL (ref 0–0.04)
IMM GRANULOCYTES NFR BLD: 0.3 % (ref 0–0.4)
LYMPHOCYTES # BLD AUTO: 1.04 K/UL (ref 1–4.8)
LYMPHOCYTES NFR BLD AUTO: 13.1 % (ref 27–41)
MAGNESIUM SERPL-MCNC: 2 MG/DL (ref 1.6–2.6)
MCH RBC QN AUTO: 28.6 PG (ref 27–31)
MCHC RBC AUTO-ENTMCNC: 32.3 G/DL (ref 32–36)
MCV RBC AUTO: 88.6 FL (ref 80–96)
MONOCYTES # BLD AUTO: 0.6 K/UL (ref 0–0.8)
MONOCYTES NFR BLD AUTO: 7.6 % (ref 2–6)
MPC BLD CALC-MCNC: 11.2 FL (ref 9.4–12.4)
NEUTROPHILS # BLD AUTO: 5.93 K/UL (ref 1.8–7.7)
NEUTROPHILS NFR BLD AUTO: 74.7 % (ref 53–65)
NRBC # BLD AUTO: 0 X10E3/UL
NRBC, AUTO (.00): 0 %
PHOSPHATE SERPL-MCNC: 3.9 MG/DL (ref 2.3–4.7)
PLATELET # BLD AUTO: 221 K/UL (ref 150–400)
POTASSIUM SERPL-SCNC: 3.8 MMOL/L (ref 3.5–5.1)
RBC # BLD AUTO: 4.2 M/UL (ref 4.2–5.4)
SODIUM SERPL-SCNC: 138 MMOL/L (ref 136–145)
WBC # BLD AUTO: 7.93 K/UL (ref 4.5–11)

## 2025-02-27 PROCEDURE — 83735 ASSAY OF MAGNESIUM: CPT | Performed by: STUDENT IN AN ORGANIZED HEALTH CARE EDUCATION/TRAINING PROGRAM

## 2025-02-27 PROCEDURE — 97166 OT EVAL MOD COMPLEX 45 MIN: CPT

## 2025-02-27 PROCEDURE — 25000003 PHARM REV CODE 250: Performed by: STUDENT IN AN ORGANIZED HEALTH CARE EDUCATION/TRAINING PROGRAM

## 2025-02-27 PROCEDURE — 84100 ASSAY OF PHOSPHORUS: CPT | Performed by: STUDENT IN AN ORGANIZED HEALTH CARE EDUCATION/TRAINING PROGRAM

## 2025-02-27 PROCEDURE — 36415 COLL VENOUS BLD VENIPUNCTURE: CPT | Performed by: STUDENT IN AN ORGANIZED HEALTH CARE EDUCATION/TRAINING PROGRAM

## 2025-02-27 PROCEDURE — 85025 COMPLETE CBC W/AUTO DIFF WBC: CPT | Performed by: STUDENT IN AN ORGANIZED HEALTH CARE EDUCATION/TRAINING PROGRAM

## 2025-02-27 PROCEDURE — 80048 BASIC METABOLIC PNL TOTAL CA: CPT | Performed by: STUDENT IN AN ORGANIZED HEALTH CARE EDUCATION/TRAINING PROGRAM

## 2025-02-27 PROCEDURE — 63600175 PHARM REV CODE 636 W HCPCS: Performed by: STUDENT IN AN ORGANIZED HEALTH CARE EDUCATION/TRAINING PROGRAM

## 2025-02-27 PROCEDURE — G0378 HOSPITAL OBSERVATION PER HR: HCPCS

## 2025-02-27 PROCEDURE — 99232 SBSQ HOSP IP/OBS MODERATE 35: CPT | Mod: ,,, | Performed by: STUDENT IN AN ORGANIZED HEALTH CARE EDUCATION/TRAINING PROGRAM

## 2025-02-27 PROCEDURE — 97162 PT EVAL MOD COMPLEX 30 MIN: CPT

## 2025-02-27 PROCEDURE — 11000001 HC ACUTE MED/SURG PRIVATE ROOM

## 2025-02-27 RX ADMIN — OXYBUTYNIN CHLORIDE 15 MG: 5 TABLET, EXTENDED RELEASE ORAL at 09:02

## 2025-02-27 RX ADMIN — ENOXAPARIN SODIUM 40 MG: 40 INJECTION SUBCUTANEOUS at 05:02

## 2025-02-27 RX ADMIN — CEFTRIAXONE SODIUM 1 G: 1 INJECTION, POWDER, FOR SOLUTION INTRAMUSCULAR; INTRAVENOUS at 01:02

## 2025-02-27 RX ADMIN — PANTOPRAZOLE SODIUM 40 MG: 40 TABLET, DELAYED RELEASE ORAL at 09:02

## 2025-02-27 RX ADMIN — VERAPAMIL HYDROCHLORIDE 180 MG: 180 TABLET ORAL at 09:02

## 2025-02-27 NOTE — PLAN OF CARE
Problem: Occupational Therapy  Goal: Occupational Therapy Goal  Description: STG:  Pt will perform grooming with setup  Pt will bathe with min a  Pt will perform UE dressing with (I)  Pt will perform LE dressing with Min a with AD as needed  Pt will sit EOB x 15 min with SBA  Pt will transfer bed/chair/bsc with Min a  Pt will tolerate 20 minutes of tx without fatigue      LT.Restore to max I with self care and mobility.     Outcome: Progressing

## 2025-02-27 NOTE — PT/OT/SLP EVAL
Occupational Therapy   Evaluation    Name: Whitley Millard  MRN: [id number]  Admitting Diagnosis: <principal problem not specified>  Recent Surgery: * No surgery found *      Recommendations:     Discharge Recommendations: Moderate Intensity Therapy  Discharge Equipment Recommendations:  to be determined by next level of care  Barriers to discharge:  None    Assessment:     Whitley Millard is a 89 y.o. female with a medical diagnosis of <principal problem not specified>.  She presents with no complaints. Pt agreed to OT evaluation. Performance deficits affecting function: weakness, impaired endurance, impaired self care skills, impaired functional mobility, impaired balance, impaired cognition, decreased safety awareness.      Rehab Prognosis: Good; patient would benefit from acute skilled OT services to address these deficits and reach maximum level of function.       Plan:     Patient to be seen 5 x/week to address the above listed problems via self-care/home management, therapeutic activities, therapeutic exercises  Plan of Care Expires: 04/03/25  Plan of Care Reviewed with: patient, family    Subjective     Chief Complaint: Metabolic Encephalopathy  Patient/Family Comments/goals: Pt agreed to evaluation    Occupational Profile:  Living Environment: Pt lived at home alone prior to swing bed admission. Pt has assistance during the day 3x week.  Previous level of function: Pt required assistance with self care  Roles and Routines: Pt has been at swing bed and receiving assistance from staff  Equipment Used at Home: wheelchair, shower chair, rollator, walker, rolling  Assistance upon Discharge: Swing bed staff    Pain/Comfort:  Pain Rating 1: 0/10  Pain Rating Post-Intervention 1: 0/10    Patients cultural, spiritual, Islam conflicts given the current situation: no    Objective:     Communicated with: MARTIN Yap prior to session.  Patient found HOB elevated with peripheral IV upon OT entry to room.    General  Precautions: Standard, fall  Orthopedic Precautions: N/A  Braces: N/A  Respiratory Status: Room air    Occupational Performance:    Bed Mobility:    Patient completed Rolling/Turning to Left with  minimum assistance and verbal/tactile cues  Patient completed Rolling/Turning to Right with minimum assistance and verbal/tactile cueing   Patient completed Supine to Sit with moderate assistance  Patient completed Sit to Supine with moderate assistance x 2    Functional Mobility/Transfers:  NT  Functional Mobility: Pt sat EOB x aopprox 15 min with min/mod a due to being fearful. Pt leaning posteriorly    Activities of Daily Living:      Cognitive/Visual Perceptual:  Cognitive/Psychosocial Skills:     -       Oriented to: Person   -       Follows Commands/attention:Follows one-step commands  -       Communication: WFL  Visual/Perceptual:      -Wears reading glasses. Hearing wfl      Physical Exam:  Balance:    -       Min/Mod a with EOB sitting due to being fearful of falling. Standing NT  Skin integrity: Visible skin intact  Sensation:    -       Intact  Motor Planning:    -       WFL  Dominant hand:    -       Right  Upper Extremity Range of Motion:     -       Right Upper Extremity: WFL  -       Left Upper Extremity: WFL  Upper Extremity Strength:    -       Right Upper Extremity: WFL  -       Left Upper Extremity: WFL   Strength:    -       Right Upper Extremity: WFL  -       Left Upper Extremity: WFL    AMPAC 6 Click ADL:  AMPAC Total Score: 14    Treatment & Education:  OT evaluation completed. See eval for details. Pt presents with decline in status due to Metabolic Encephalopathy. Tx plan to focus on increasing UE strength/tolerance and I with self care and mobility.  Pt educated on OT role/POC.   Importance of OOB activity   Safety during functional t/f and mobility with use of RW  Importance of assisting with self-care activities   All questions/concerns answered within OT scope of practice     Patient left  HOB elevated with all lines intact, call button in reach, and nurse notified    GOALS:   Multidisciplinary Problems       Occupational Therapy Goals          Problem: Occupational Therapy    Goal Priority Disciplines Outcome Interventions   Occupational Therapy Goal     OT, PT/OT Progressing    Description: STG:  Pt will perform grooming with setup  Pt will bathe with min a  Pt will perform UE dressing with (I)  Pt will perform LE dressing with Min a with AD as needed  Pt will sit EOB x 15 min with SBA  Pt will transfer bed/chair/bsc with Min a  Pt will tolerate 20 minutes of tx without fatigue      LT.Restore to max I with self care and mobility.                          DME Justifications:      History:     Past Medical History:   Diagnosis Date    Cholelithiases     Essential hypertension 2021    GERD (gastroesophageal reflux disease)     Macular degeneration     Osteoarthrosis     Pulmonary hypertension     RVSP  37 mmHg    Ureteral stone with hydronephrosis          Past Surgical History:   Procedure Laterality Date    CARPAL TUNNEL RELEASE Bilateral     FOOT SURGERY Left     TOTAL HIP ARTHROPLASTY Right     TOTAL KNEE ARTHROPLASTY Left     URETERORENOSCOPY Left 12/15/2021    Procedure: URETERORENOSCOPY WITH LASER LITHOTRIPSY AND INSERTION OF URETERAL STENT;  Surgeon: Hernando Hanson Jr., MD;  Location: [facility];  Service: Urology;  Laterality: Left;       Time Tracking:     OT Date of Treatment: 25  OT Start Time: 1058  OT Stop Time: 1118  OT Total Time (min): 20 min    Billable Minutes:Evaluation 2025

## 2025-02-27 NOTE — PROGRESS NOTES
[facility] Hospital Medicine  Progress Note    Patient Name: Whitley Millard  MRN: [id number]  Patient Class: OP- Observation   Admission Date: 2/26/2025  Length of Stay: 0 days  Attending Physician: Sudheer Foss DO  Primary Care Provider: Rishi Appiah MD        Subjective     Principal Problem:<principal problem not specified>        HPI:       Ms. Millard is a 89-year-old white female with a past medical history of chronic UTI, nephrolithiasis, hypertension, macular degeneration who presents to the hospital from her swing bed facility with increased confusion.  She had a UTI approximately 2 weeks ago that was fully treated.  Urine in the ED today shows evidence of recurrent UTI.  She has had this happen in the past which was due to renal stones.  Subsequently had lithotripsy and passage of stones and resolution of recurrent UTIs.  She is very pleasant today but confused.  She has no complaints of the time of my interview.  Workup was unremarkable beyond likely UTI.          Overview/Hospital Course:  2/27 still confused but seems better    Interval History:  No acute events overnight    Review of Systems  Objective:     Vital Signs (Most Recent):  Temp: 97.7 °F (36.5 °C) (02/27/25 0533)  Pulse: 79 (02/27/25 0533)  Resp: 18 (02/27/25 0000)  BP: (!) 167/87 (02/27/25 0533)  SpO2: (!) 93 % (02/27/25 0533) Vital Signs (24h Range):  Temp:  [97.5 °F (36.4 °C)-98.2 °F (36.8 °C)] 97.7 °F (36.5 °C)  Pulse:  [72-85] 79  Resp:  [18] 18  SpO2:  [93 %-96 %] 93 %  BP: (116-167)/(75-88) 167/87     Weight: 81.6 kg (180 lb)  Body mass index is 29.95 kg/m².  No intake or output data in the 24 hours ending 02/27/25 1120      Physical Exam  Vitals reviewed.   Constitutional:       Appearance: Normal appearance.   HENT:      Head: Normocephalic and atraumatic.      Nose: Nose normal.   Eyes:      Extraocular Movements: Extraocular movements intact.      Conjunctiva/sclera: Conjunctivae normal.    Neck:      Trachea: Trachea normal.   Cardiovascular:      Rate and Rhythm: Normal rate and regular rhythm.      Pulses: Normal pulses.      Heart sounds: Normal heart sounds.   Pulmonary:      Effort: Pulmonary effort is normal.      Breath sounds: Normal breath sounds and air entry.   Abdominal:      General: Bowel sounds are normal.      Palpations: Abdomen is soft.   Musculoskeletal:         General: Normal range of motion.      Cervical back: Neck supple.   Skin:     General: Skin is warm and dry.   Neurological:      General: No focal deficit present.      Mental Status: She is alert. She is disoriented.      Comments: Grossly normal motor and sensory function without focal deficit appreciated.   Psychiatric:         Mood and Affect: Mood and affect normal.         Behavior: Behavior is cooperative.             Significant Labs: All pertinent labs within the past 24 hours have been reviewed.    Significant Imaging: I have reviewed all pertinent imaging results/findings within the past 24 hours.    Assessment and Plan     UTI (urinary tract infection)  Pansensitive E coli in urine 2 weeks ago.  Fully treated.  Preliminary urine looks infected today.  Empiric Rocephin.  CT reveals no evidence of nephrolithiasis  Await final cultures    Encephalopathy, metabolic  Patient has acute metabolic encephalopathy that is secondary to Sepsis/Infective. Patient's current mental status is Awake, Alert, Confused. Patient's baseline mental status is. awake and alert; oriented to person, place, and time Evaluation and for underlying cause(s) is underway and inclusive of Blood Chemistries. Will monitor neuro checks carefully, avoid narcotics and benzos that will exacerbate agitation, and use PRN medications for controls of behavior for self harm.        Essential hypertension  Patient's blood pressure range in the last 24 hours was: BP  Min: 116/75  Max: 167/87.The patient's inpatient anti-hypertensive regimen is listed  below:  Current Antihypertensives  verapamiL CR tablet 180 mg, Daily, Oral    Plan  - BP is controlled, no changes needed to their regimen  - follow    GERD (gastroesophageal reflux disease)  Protonix      Thrombocytopenia  Monitor CBC.  Thrombocytopenia has currently resolved        VTE Risk Mitigation (From admission, onward)           Ordered     enoxaparin injection 40 mg  Daily         02/26/25 1727     IP VTE HIGH RISK PATIENT  Once         02/26/25 1727     Place sequential compression device  Until discontinued         02/26/25 1727                    Discharge Planning   CHASITY:      Code Status: Full Code   Medical Readiness for Discharge Date:            G negatives in urine.  Labs reviewed.  Check metabolic panel tomorrow.  Family updated on assessment and plan        Please place Justification for DME        Sudheer Foss DO  Department of Hospital Medicine   [facility] - [address] [place] [address]

## 2025-02-27 NOTE — PHARMACY MED REC
"Admission Medication History     The home medication history was taken by Irina Rodney.    You may go to "Admission" then "Reconcile Home Medications" tabs to review and/or act upon these items.     The home medication list has been updated by the Pharmacy department.   Please read ALL comments highlighted in yellow.   Please address this information as you see fit.    Feel free to contact us if you have any questions or require assistance.    The following medications were added:  Buspirone 5 mg  Lactobacillus capsule  Magnesium oxide 400 mg  Potassium chloride 20 mEq  Senna-docusate 8.6-50 mg  Sertraline 25 mg     The medications listed below were removed from the home medication list. Please reorder if appropriate:  Furosemide 20 mg  Prednisone 20 mg  Sertraline 50 mg    Medications listed below were obtained from: Analytic software- [employer], Medical records, and SNF/Rehab/LTAC   (Not in a hospital admission)        Current Outpatient Medications on File Prior to Encounter   Medication Sig Dispense Refill Last Dose/Taking    acetaminophen (TYLENOL) 325 MG tablet Take 650 mg by mouth every 6 (six) hours as needed for Pain (and/or fever).   Taking As Needed    busPIRone (BUSPAR) 5 MG Tab Take 5 mg by mouth 2 (two) times daily.   2/26/2025    cetirizine (ZYRTEC) 10 MG tablet Take 10 mg by mouth once daily.   2/26/2025    diclofenac sodium (VOLTAREN ARTHRITIS PAIN) 1 % Gel Apply 2 g topically once daily. Apply to right knee topically TID for right knee pain   2/26/2025    esomeprazole (NEXIUM) 40 MG capsule TAKE 1 CAPSULE BY MOUTH EVERY DAY BEFORE BREAKFAST 90 capsule 1 2/26/2025    Lactobacillus rhamnosus GG (CULTURELLE) 10 billion cell capsule Take 1 capsule by mouth 2 (two) times a day.   2/26/2025    magnesium oxide (MAG-OX) 400 mg (241.3 mg magnesium) tablet Take 400 mg by mouth once daily.   2/26/2025    methenamine (HIPREX) 1 gram Tab TAKE 1 TABLET BY MOUTH TWICE DAILY 180 tablet 3 2/26/2025    multivitamin " (THERAGRAN) per tablet Take 1 tablet by mouth once daily.   2/26/2025    ondansetron (ZOFRAN) 4 MG tablet Take 4 mg by mouth every 6 (six) hours as needed for Nausea.   Taking As Needed    oxybutynin (DITROPAN XL) 15 MG TR24 Take 15 mg by mouth once daily.   2/26/2025    polyethylene glycol (GLYCOLAX) 17 gram PwPk Take 17 g by mouth daily as needed for Constipation.   2/26/2025    potassium chloride SA (K-DUR,KLOR-CON) 20 MEQ tablet Take 20 mEq by mouth once daily.   2/26/2025    senna-docusate 8.6-50 mg (PERICOLACE) 8.6-50 mg per tablet Take 2 tablets by mouth every evening.   2/25/2025    sertraline (ZOLOFT) 25 MG tablet Take 25 mg by mouth once daily.   Taking    verapamiL (CALAN-SR) 180 MG CR tablet Take 180 mg by mouth once daily.   2/26/2025    [DISCONTINUED] furosemide (LASIX) 20 MG tablet Take 20 mg by mouth every other day.       [DISCONTINUED] predniSONE (DELTASONE) 20 MG tablet Take 2 tablets (40 mg total) by mouth once daily.       [DISCONTINUED] sertraline (ZOLOFT) 50 MG tablet Take 1 tablet (50 mg total) by mouth once daily. 30 tablet 11        Potential issues to be addressed PRIOR TO DISCHARGE  N/A    Irina Rodney  EXT [id number]                 .

## 2025-02-27 NOTE — SUBJECTIVE & OBJECTIVE
Interval History:  No acute events overnight    Review of Systems  Objective:     Vital Signs (Most Recent):  Temp: 97.7 °F (36.5 °C) (02/27/25 0533)  Pulse: 79 (02/27/25 0533)  Resp: 18 (02/27/25 0000)  BP: (!) 167/87 (02/27/25 0533)  SpO2: (!) 93 % (02/27/25 0533) Vital Signs (24h Range):  Temp:  [97.5 °F (36.4 °C)-98.2 °F (36.8 °C)] 97.7 °F (36.5 °C)  Pulse:  [72-85] 79  Resp:  [18] 18  SpO2:  [93 %-96 %] 93 %  BP: (116-167)/(75-88) 167/87     Weight: 81.6 kg (180 lb)  Body mass index is 29.95 kg/m².  No intake or output data in the 24 hours ending 02/27/25 1120      Physical Exam  Vitals reviewed.   Constitutional:       Appearance: Normal appearance.   HENT:      Head: Normocephalic and atraumatic.      Nose: Nose normal.   Eyes:      Extraocular Movements: Extraocular movements intact.      Conjunctiva/sclera: Conjunctivae normal.   Neck:      Trachea: Trachea normal.   Cardiovascular:      Rate and Rhythm: Normal rate and regular rhythm.      Pulses: Normal pulses.      Heart sounds: Normal heart sounds.   Pulmonary:      Effort: Pulmonary effort is normal.      Breath sounds: Normal breath sounds and air entry.   Abdominal:      General: Bowel sounds are normal.      Palpations: Abdomen is soft.   Musculoskeletal:         General: Normal range of motion.      Cervical back: Neck supple.   Skin:     General: Skin is warm and dry.   Neurological:      General: No focal deficit present.      Mental Status: She is alert. She is disoriented.      Comments: Grossly normal motor and sensory function without focal deficit appreciated.   Psychiatric:         Mood and Affect: Mood and affect normal.         Behavior: Behavior is cooperative.             Significant Labs: All pertinent labs within the past 24 hours have been reviewed.    Significant Imaging: I have reviewed all pertinent imaging results/findings within the past 24 hours.

## 2025-02-27 NOTE — H&P
[facility] Medicine  History & Physical    Patient Name: Whitley Millard  MRN: [id number]  Patient Class: OP- Observation  Admission Date: 2/26/2025  Attending Physician: Sudheer Foss DO   Primary Care Provider: Rishi Appiah MD         Patient information was obtained from patient, past medical records, and ER records.     Subjective:     Principal Problem:<principal problem not specified>    Chief Complaint:   Chief Complaint   Patient presents with    Altered Mental Status        HPI:        Ms. Millard is a 89-year-old white female with a past medical history of chronic UTI, nephrolithiasis, hypertension, macular degeneration who presents to the hospital from her swing bed facility with increased confusion.  She had a UTI approximately 2 weeks ago that was fully treated.  Urine in the ED today shows evidence of recurrent UTI.  She has had this happen in the past which was due to renal stones.  Subsequently had lithotripsy and passage of stones and resolution of recurrent UTIs.  She is very pleasant today but confused.  She has no complaints of the time of my interview.  Workup was unremarkable beyond likely UTI.          Past Medical History:   Diagnosis Date    Cholelithiases     Essential hypertension 08/26/2021    GERD (gastroesophageal reflux disease)     Macular degeneration     Osteoarthrosis     Pulmonary hypertension 2021    RVSP  37 mmHg    Ureteral stone with hydronephrosis        Past Surgical History:   Procedure Laterality Date    CARPAL TUNNEL RELEASE Bilateral     FOOT SURGERY Left     TOTAL HIP ARTHROPLASTY Right     TOTAL KNEE ARTHROPLASTY Left     URETERORENOSCOPY Left 12/15/2021    Procedure: URETERORENOSCOPY WITH LASER LITHOTRIPSY AND INSERTION OF URETERAL STENT;  Surgeon: Hernando Hanson Jr., MD;  Location: [facility];  Service: Urology;  Laterality: Left;       Review of patient's allergies indicates:   Allergen Reactions    Codeine         No current facility-administered medications on file prior to encounter.     Current Outpatient Medications on File Prior to Encounter   Medication Sig    acetaminophen (TYLENOL) 325 MG tablet Take 650 mg by mouth every 6 (six) hours as needed for Pain (and/or fever).    busPIRone (BUSPAR) 5 MG Tab Take 5 mg by mouth 2 (two) times daily.    cetirizine (ZYRTEC) 10 MG tablet Take 10 mg by mouth once daily.    diclofenac sodium (VOLTAREN ARTHRITIS PAIN) 1 % Gel Apply 2 g topically once daily. Apply to right knee topically TID for right knee pain    esomeprazole (NEXIUM) 40 MG capsule TAKE 1 CAPSULE BY MOUTH EVERY DAY BEFORE BREAKFAST    Lactobacillus rhamnosus GG (CULTURELLE) 10 billion cell capsule Take 1 capsule by mouth 2 (two) times a day.    magnesium oxide (MAG-OX) 400 mg (241.3 mg magnesium) tablet Take 400 mg by mouth once daily.    methenamine (HIPREX) 1 gram Tab TAKE 1 TABLET BY MOUTH TWICE DAILY    multivitamin (THERAGRAN) per tablet Take 1 tablet by mouth once daily.    ondansetron (ZOFRAN) 4 MG tablet Take 4 mg by mouth every 6 (six) hours as needed for Nausea.    oxybutynin (DITROPAN XL) 15 MG TR24 Take 15 mg by mouth once daily.    polyethylene glycol (GLYCOLAX) 17 gram PwPk Take 17 g by mouth daily as needed for Constipation.    potassium chloride SA (K-DUR,KLOR-CON) 20 MEQ tablet Take 20 mEq by mouth once daily.    senna-docusate 8.6-50 mg (PERICOLACE) 8.6-50 mg per tablet Take 2 tablets by mouth every evening.    sertraline (ZOLOFT) 25 MG tablet Take 25 mg by mouth once daily.    verapamiL (CALAN-SR) 180 MG CR tablet Take 180 mg by mouth once daily.     Family History       Problem Relation (Age of Onset)    Cancer Son    Heart disease Sister    Stroke Father          Tobacco Use    Smoking status: Never    Smokeless tobacco: Never   Substance and Sexual Activity    Alcohol use: Never    Drug use: Never    Sexual activity: Not Currently     Review of Systems   Reason unable to perform ROS:  Encephalopathy.     Objective:     Vital Signs (Most Recent):  Temp: 97.7 °F (36.5 °C) (02/27/25 0533)  Pulse: 79 (02/27/25 0533)  Resp: 18 (02/27/25 0000)  BP: (!) 167/87 (02/27/25 0533)  SpO2: (!) 93 % (02/27/25 0533) Vital Signs (24h Range):  Temp:  [97.5 °F (36.4 °C)-98.2 °F (36.8 °C)] 97.7 °F (36.5 °C)  Pulse:  [72-85] 79  Resp:  [18] 18  SpO2:  [93 %-96 %] 93 %  BP: (116-167)/(75-88) 167/87     Weight: 81.6 kg (180 lb)  Body mass index is 29.95 kg/m².     Physical Exam  Vitals reviewed.   Constitutional:       Appearance: Normal appearance.   HENT:      Head: Normocephalic and atraumatic.      Nose: Nose normal.   Eyes:      Extraocular Movements: Extraocular movements intact.      Conjunctiva/sclera: Conjunctivae normal.   Neck:      Trachea: Trachea normal.   Cardiovascular:      Rate and Rhythm: Normal rate and regular rhythm.      Pulses: Normal pulses.      Heart sounds: Normal heart sounds.   Pulmonary:      Effort: Pulmonary effort is normal.      Breath sounds: Normal breath sounds and air entry.   Abdominal:      General: Bowel sounds are normal.      Palpations: Abdomen is soft.   Musculoskeletal:         General: Normal range of motion.      Cervical back: Neck supple.   Skin:     General: Skin is warm and dry.   Neurological:      General: No focal deficit present.      Mental Status: She is alert. She is disoriented.      Comments: Grossly normal motor and sensory function without focal deficit appreciated.   Psychiatric:         Mood and Affect: Mood and affect normal.         Behavior: Behavior is cooperative.                Significant Labs: All pertinent labs within the past 24 hours have been reviewed.    Significant Imaging: I have reviewed all pertinent imaging results/findings within the past 24 hours.  Assessment/Plan:     UTI (urinary tract infection)  Pansensitive E coli in urine 2 weeks ago.  Fully treated.  Preliminary urine looks infected today.  Empiric Rocephin.  CT reveals no  evidence of nephrolithiasis      Encephalopathy, metabolic  Patient has acute metabolic encephalopathy that is secondary to Sepsis/Infective. Patient's current mental status is Awake, Alert, Confused. Patient's baseline mental status is. awake and alert; oriented to person, place, and time Evaluation and for underlying cause(s) is underway and inclusive of Blood Chemistries. Will monitor neuro checks carefully, avoid narcotics and benzos that will exacerbate agitation, and use PRN medications for controls of behavior for self harm.        Essential hypertension  Patient's blood pressure range in the last 24 hours was: BP  Min: 116/75  Max: 167/87.The patient's inpatient anti-hypertensive regimen is listed below:  Current Antihypertensives  verapamiL CR tablet 180 mg, Daily, Oral    Plan  - BP is controlled, no changes needed to their regimen  - follow    GERD (gastroesophageal reflux disease)  Protonix      Thrombocytopenia  Monitor CBC.  Thrombocytopenia has currently resolved        VTE Risk Mitigation (From admission, onward)           Ordered     enoxaparin injection 40 mg  Daily         02/26/25 1727     IP VTE HIGH RISK PATIENT  Once         02/26/25 1727     Place sequential compression device  Until discontinued         02/26/25 1727                       On 02/27/2025, patient should be placed in hospital observation services under my care.             Sudheer Foss DO  Department of Hospital Medicine  [facility] - [address] [place] [address]

## 2025-02-27 NOTE — PROGRESS NOTES
[facility]  Wound Care    Patient Name:  Whitley Millard   MRN:  [id number]  Date: 2/27/2025  Diagnosis: <principal problem not specified>    History:     Past Medical History:   Diagnosis Date    Cholelithiases     Essential hypertension 08/26/2021    GERD (gastroesophageal reflux disease)     Macular degeneration     Osteoarthrosis     Pulmonary hypertension 2021    RVSP  37 mmHg    Ureteral stone with hydronephrosis        Social History[1]    Precautions:     Allergies as of 02/26/2025 - Reviewed 02/26/2025   Allergen Reaction Noted    Codeine  08/25/2021       WO Assessment Details/Treatment     Narrative: Seen patient for initial preventative skin care measures.  Patient ambulates with walker.  No foam borders, redness, or open wounds noted to bilateral heels and sacral.  Consult wound care of any findings.    02/27/2025        [1]   Social History  Socioeconomic History    Marital status:    Tobacco Use    Smoking status: Never    Smokeless tobacco: Never   Substance and Sexual Activity    Alcohol use: Never    Drug use: Never    Sexual activity: Not Currently     Social Drivers of Health     Financial Resource Strain: Low Risk  (2/27/2025)    Overall Financial Resource Strain (CARDIA)     Difficulty of Paying Living Expenses: Not hard at all   Food Insecurity: No Food Insecurity (2/27/2025)    Hunger Vital Sign     Worried About Running Out of Food in the Last Year: Never true     Ran Out of Food in the Last Year: Never true   Transportation Needs: No Transportation Needs (12/16/2024)    TRANSPORTATION NEEDS     Transportation : No   Physical Activity: Inactive (2/27/2025)    Exercise Vital Sign     Days of Exercise per Week: 0 days     Minutes of Exercise per Session: 0 min   Stress: No Stress Concern Present (2/27/2025)    [place] [employer] - Occupational Stress Questionnaire     Feeling of Stress : Not at all   Housing Stability: Low Risk   (2/27/2025)    Housing Stability Vital Sign     Unable to Pay for Housing in the Last Year: No     Homeless in the Last Year: No

## 2025-02-27 NOTE — ASSESSMENT & PLAN NOTE
Patient has acute metabolic encephalopathy that is secondary to Sepsis/Infective. Patient's current mental status is Awake, Alert, Confused. Patient's baseline mental status is. awake and alert; oriented to person, place, and time Evaluation and for underlying cause(s) is underway and inclusive of Blood Chemistries. Will monitor neuro checks carefully, avoid narcotics and benzos that will exacerbate agitation, and use PRN medications for controls of behavior for self harm.

## 2025-02-27 NOTE — ASSESSMENT & PLAN NOTE
Pansensitive E coli in urine 2 weeks ago.  Fully treated.  Preliminary urine looks infected today.  Empiric Rocephin.  CT reveals no evidence of nephrolithiasis  Await final cultures

## 2025-02-27 NOTE — SUBJECTIVE & OBJECTIVE
Past Medical History:   Diagnosis Date    Cholelithiases     Essential hypertension 08/26/2021    GERD (gastroesophageal reflux disease)     Macular degeneration     Osteoarthrosis     Pulmonary hypertension 2021    RVSP  37 mmHg    Ureteral stone with hydronephrosis        Past Surgical History:   Procedure Laterality Date    CARPAL TUNNEL RELEASE Bilateral     FOOT SURGERY Left     TOTAL HIP ARTHROPLASTY Right     TOTAL KNEE ARTHROPLASTY Left     URETERORENOSCOPY Left 12/15/2021    Procedure: URETERORENOSCOPY WITH LASER LITHOTRIPSY AND INSERTION OF URETERAL STENT;  Surgeon: Hernando Hanson Jr., MD;  Location: [facility] [place];  Service: Urology;  Laterality: Left;       Review of patient's allergies indicates:   Allergen Reactions    Codeine        No current facility-administered medications on file prior to encounter.     Current Outpatient Medications on File Prior to Encounter   Medication Sig    acetaminophen (TYLENOL) 325 MG tablet Take 650 mg by mouth every 6 (six) hours as needed for Pain (and/or fever).    busPIRone (BUSPAR) 5 MG Tab Take 5 mg by mouth 2 (two) times daily.    cetirizine (ZYRTEC) 10 MG tablet Take 10 mg by mouth once daily.    diclofenac sodium (VOLTAREN ARTHRITIS PAIN) 1 % Gel Apply 2 g topically once daily. Apply to right knee topically TID for right knee pain    esomeprazole (NEXIUM) 40 MG capsule TAKE 1 CAPSULE BY MOUTH EVERY DAY BEFORE BREAKFAST    Lactobacillus rhamnosus GG (CULTURELLE) 10 billion cell capsule Take 1 capsule by mouth 2 (two) times a day.    magnesium oxide (MAG-OX) 400 mg (241.3 mg magnesium) tablet Take 400 mg by mouth once daily.    methenamine (HIPREX) 1 gram Tab TAKE 1 TABLET BY MOUTH TWICE DAILY    multivitamin (THERAGRAN) per tablet Take 1 tablet by mouth once daily.    ondansetron (ZOFRAN) 4 MG tablet Take 4 mg by mouth every 6 (six) hours as needed for Nausea.    oxybutynin (DITROPAN XL) 15 MG TR24 Take 15 mg by mouth once daily.    polyethylene glycol (GLYCOLAX)  17 gram PwPk Take 17 g by mouth daily as needed for Constipation.    potassium chloride SA (K-DUR,KLOR-CON) 20 MEQ tablet Take 20 mEq by mouth once daily.    senna-docusate 8.6-50 mg (PERICOLACE) 8.6-50 mg per tablet Take 2 tablets by mouth every evening.    sertraline (ZOLOFT) 25 MG tablet Take 25 mg by mouth once daily.    verapamiL (CALAN-SR) 180 MG CR tablet Take 180 mg by mouth once daily.     Family History       Problem Relation (Age of Onset)    Cancer Son    Heart disease Sister    Stroke Father          Tobacco Use    Smoking status: Never    Smokeless tobacco: Never   Substance and Sexual Activity    Alcohol use: Never    Drug use: Never    Sexual activity: Not Currently     Review of Systems   Reason unable to perform ROS: Encephalopathy.     Objective:     Vital Signs (Most Recent):  Temp: 97.7 °F (36.5 °C) (02/27/25 0533)  Pulse: 79 (02/27/25 0533)  Resp: 18 (02/27/25 0000)  BP: (!) 167/87 (02/27/25 0533)  SpO2: (!) 93 % (02/27/25 0533) Vital Signs (24h Range):  Temp:  [97.5 °F (36.4 °C)-98.2 °F (36.8 °C)] 97.7 °F (36.5 °C)  Pulse:  [72-85] 79  Resp:  [18] 18  SpO2:  [93 %-96 %] 93 %  BP: (116-167)/(75-88) 167/87     Weight: 81.6 kg (180 lb)  Body mass index is 29.95 kg/m².     Physical Exam  Vitals reviewed.   Constitutional:       Appearance: Normal appearance.   HENT:      Head: Normocephalic and atraumatic.      Nose: Nose normal.   Eyes:      Extraocular Movements: Extraocular movements intact.      Conjunctiva/sclera: Conjunctivae normal.   Neck:      Trachea: Trachea normal.   Cardiovascular:      Rate and Rhythm: Normal rate and regular rhythm.      Pulses: Normal pulses.      Heart sounds: Normal heart sounds.   Pulmonary:      Effort: Pulmonary effort is normal.      Breath sounds: Normal breath sounds and air entry.   Abdominal:      General: Bowel sounds are normal.      Palpations: Abdomen is soft.   Musculoskeletal:         General: Normal range of motion.      Cervical back: Neck supple.    Skin:     General: Skin is warm and dry.   Neurological:      General: No focal deficit present.      Mental Status: She is alert. She is disoriented.      Comments: Grossly normal motor and sensory function without focal deficit appreciated.   Psychiatric:         Mood and Affect: Mood and affect normal.         Behavior: Behavior is cooperative.                Significant Labs: All pertinent labs within the past 24 hours have been reviewed.    Significant Imaging: I have reviewed all pertinent imaging results/findings within the past 24 hours.

## 2025-02-27 NOTE — PLAN OF CARE
[facility] - [facility]  Initial Discharge Assessment       Primary Care Provider: Rishi Appiah MD    Admission Diagnosis: Chest pain [R07.9]  Urinary tract infection without hematuria, site unspecified [N39.0]  AMS (altered mental status) [R41.82]    Admission Date: 2/26/2025  Expected Discharge Date:     Transition of Care Barriers: None    Payor: MEDICARE / Plan: MEDICARE PART A & B / Product Type: Government /     Extended Emergency Contact Information  Primary Emergency Contact: Sedrick Millard  Mobile Phone: [phone]  Relation: Son   needed? No    Discharge Plan A: Skilled Nursing Facility  Discharge Plan B: Home Health, Long-term acute care facility (LTAC), Rehab, Skilled Nursing Facility      [id number] - [place] - [address] AT [facility] [address]  [address] [place] [address]  Phone: [phone] Fax: [phone]      Initial Assessment (most recent)       Adult Discharge Assessment - 02/27/25 1530          Discharge Assessment    Assessment Type Discharge Planning Assessment     Source of Information family     People in Home --   [facility]    Facility Arrived From: [facility]     Do you expect to return to your current living situation? Yes     Do you have help at home or someone to help you manage your care at home? No     Prior to hospitilization cognitive status: Unable to Assess     Current cognitive status: --   Confused    Walking or Climbing Stairs Difficulty yes     Walking or Climbing Stairs ambulation difficulty, requires equipment     Dressing/Bathing Difficulty yes     Dressing/Bathing bathing difficulty, assistance 1 person;dressing difficulty, assistance 1 person     Home Accessibility wheelchair accessible     Home Layout Able to live on 1st floor     Equipment Currently Used at Home walker, rolling;wheelchair     Readmission within 30 days? No     Patient currently being followed by  outpatient case management? No     Do you currently have service(s) that help you manage your care at home? No     Do you take prescription medications? Yes     Do you have prescription coverage? Yes     Coverage Medicare A & B     Do you have any problems affording any of your prescribed medications? No     Is the patient taking medications as prescribed? yes     Who is going to help you get home at discharge? Sedrick Millard - son - [phone]     How do you get to doctors appointments? family or friend will provide     Are you on dialysis? No     Do you take coumadin? No     Discharge Plan A Skilled Nursing Facility     Discharge Plan B Home Health;Long-term acute care facility (LTAC);Rehab;Skilled Nursing Facility     DME Needed Upon Discharge  none     Discharge Plan discussed with: Adult children     Transition of Care Barriers None        Physical Activity    On average, how many days per week do you engage in moderate to strenuous exercise (like a brisk walk)? 0 days     On average, how many minutes do you engage in exercise at this level? 0 min        Financial Resource Strain    How hard is it for you to pay for the very basics like food, housing, medical care, and heating? Not hard at all        Housing Stability    In the last 12 months, was there a time when you were not able to pay the mortgage or rent on time? No     At any time in the past 12 months, were you homeless or living in a shelter (including now)? No        Transportation Needs    Has the lack of transportation kept you from medical appointments, meetings, work or from getting things needed for daily living? No        Food Insecurity    Within the past 12 months, you worried that your food would run out before you got the money to buy more. Never true     Within the past 12 months, the food you bought just didn't last and you didn't have money to get more. Never true        Stress    Do you feel stress - tense, restless, nervous, or  anxious, or unable to sleep at night because your mind is troubled all the time - these days? Not at all        Social Isolation    How often do you feel lonely or isolated from those around you?  Never        Alcohol Use    Q1: How often do you have a drink containing alcohol? Never     Q2: How many drinks containing alcohol do you have on a typical day when you are drinking? Patient does not drink     Q3: How often do you have six or more drinks on one occasion? Never        Utilities    In the past 12 months has the electric, gas, oil, or water company threatened to shut off services in your home? No        Health Literacy    How often do you need to have someone help you when you read instructions, pamphlets, or other written material from your doctor or pharmacy? Rarely                      CM at bedside to complete initial discharge assessment.  Patient is confused at this time.  CM contacted patient's son to assist with assessment.  Patient is currently in [facility]B at [facility] in [place].  All used and needed DME will be provided at facility.  Plans are for patient to return to B at [facility] in [place].  SDOH questions completed.  CM will continue to follow for DC needs as they arise.

## 2025-02-27 NOTE — ASSESSMENT & PLAN NOTE
Patient's blood pressure range in the last 24 hours was: BP  Min: 116/75  Max: 167/87.The patient's inpatient anti-hypertensive regimen is listed below:  Current Antihypertensives  verapamiL CR tablet 180 mg, Daily, Oral    Plan  - BP is controlled, no changes needed to their regimen  - follow

## 2025-02-27 NOTE — ASSESSMENT & PLAN NOTE
Pansensitive E coli in urine 2 weeks ago.  Fully treated.  Preliminary urine looks infected today.  Empiric Rocephin.  CT reveals no evidence of nephrolithiasis

## 2025-02-28 VITALS
SYSTOLIC BLOOD PRESSURE: 95 MMHG | WEIGHT: 179.88 LBS | OXYGEN SATURATION: 96 % | HEART RATE: 106 BPM | DIASTOLIC BLOOD PRESSURE: 68 MMHG | RESPIRATION RATE: 16 BRPM | TEMPERATURE: 99 F | HEIGHT: 65 IN | BODY MASS INDEX: 29.97 KG/M2

## 2025-02-28 PROBLEM — D69.6 THROMBOCYTOPENIA: Status: RESOLVED | Noted: 2021-08-26 | Resolved: 2025-02-28

## 2025-02-28 PROBLEM — G93.41 ENCEPHALOPATHY, METABOLIC: Status: RESOLVED | Noted: 2025-02-26 | Resolved: 2025-02-28

## 2025-02-28 LAB
ANION GAP SERPL CALCULATED.3IONS-SCNC: 13 MMOL/L (ref 7–16)
BASOPHILS # BLD AUTO: 0.03 K/UL (ref 0–0.2)
BASOPHILS NFR BLD AUTO: 0.4 % (ref 0–1)
BUN SERPL-MCNC: 12 MG/DL (ref 10–20)
BUN/CREAT SERPL: 14 (ref 6–20)
CALCIUM SERPL-MCNC: 10 MG/DL (ref 8.4–10.2)
CHLORIDE SERPL-SCNC: 106 MMOL/L (ref 98–111)
CO2 SERPL-SCNC: 25 MMOL/L (ref 23–31)
CREAT SERPL-MCNC: 0.88 MG/DL (ref 0.55–1.02)
DIFFERENTIAL METHOD BLD: ABNORMAL
EGFR (NO RACE VARIABLE) (RUSH/TITUS): 62 ML/MIN/1.73M2
EOSINOPHIL # BLD AUTO: 0.34 K/UL (ref 0–0.5)
EOSINOPHIL NFR BLD AUTO: 5 % (ref 1–4)
ERYTHROCYTE [DISTWIDTH] IN BLOOD BY AUTOMATED COUNT: 14.9 % (ref 11.5–14.5)
GLUCOSE SERPL-MCNC: 81 MG/DL (ref 75–121)
HCT VFR BLD AUTO: 40.2 % (ref 38–47)
HGB BLD-MCNC: 12.7 G/DL (ref 12–16)
IMM GRANULOCYTES # BLD AUTO: 0.02 K/UL (ref 0–0.04)
IMM GRANULOCYTES NFR BLD: 0.3 % (ref 0–0.4)
LYMPHOCYTES # BLD AUTO: 1.23 K/UL (ref 1–4.8)
LYMPHOCYTES NFR BLD AUTO: 18 % (ref 27–41)
MAGNESIUM SERPL-MCNC: 2.1 MG/DL (ref 1.6–2.6)
MCH RBC QN AUTO: 28.2 PG (ref 27–31)
MCHC RBC AUTO-ENTMCNC: 31.6 G/DL (ref 32–36)
MCV RBC AUTO: 89.3 FL (ref 80–96)
MONOCYTES # BLD AUTO: 0.57 K/UL (ref 0–0.8)
MONOCYTES NFR BLD AUTO: 8.3 % (ref 2–6)
MPC BLD CALC-MCNC: 10.8 FL (ref 9.4–12.4)
NEUTROPHILS # BLD AUTO: 4.66 K/UL (ref 1.8–7.7)
NEUTROPHILS NFR BLD AUTO: 68 % (ref 53–65)
NRBC # BLD AUTO: 0 X10E3/UL
NRBC, AUTO (.00): 0 %
PHOSPHATE SERPL-MCNC: 4.1 MG/DL (ref 2.3–4.7)
PLATELET # BLD AUTO: 212 K/UL (ref 150–400)
POTASSIUM SERPL-SCNC: 3.5 MMOL/L (ref 3.5–5.1)
RBC # BLD AUTO: 4.5 M/UL (ref 4.2–5.4)
SODIUM SERPL-SCNC: 140 MMOL/L (ref 136–145)
UA COMPLETE W REFLEX CULTURE PNL UR: ABNORMAL
WBC # BLD AUTO: 6.85 K/UL (ref 4.5–11)

## 2025-02-28 PROCEDURE — 84100 ASSAY OF PHOSPHORUS: CPT | Performed by: STUDENT IN AN ORGANIZED HEALTH CARE EDUCATION/TRAINING PROGRAM

## 2025-02-28 PROCEDURE — 63600175 PHARM REV CODE 636 W HCPCS: Performed by: STUDENT IN AN ORGANIZED HEALTH CARE EDUCATION/TRAINING PROGRAM

## 2025-02-28 PROCEDURE — 85025 COMPLETE CBC W/AUTO DIFF WBC: CPT | Performed by: STUDENT IN AN ORGANIZED HEALTH CARE EDUCATION/TRAINING PROGRAM

## 2025-02-28 PROCEDURE — 97530 THERAPEUTIC ACTIVITIES: CPT

## 2025-02-28 PROCEDURE — 25000003 PHARM REV CODE 250: Performed by: STUDENT IN AN ORGANIZED HEALTH CARE EDUCATION/TRAINING PROGRAM

## 2025-02-28 PROCEDURE — 36415 COLL VENOUS BLD VENIPUNCTURE: CPT | Performed by: STUDENT IN AN ORGANIZED HEALTH CARE EDUCATION/TRAINING PROGRAM

## 2025-02-28 PROCEDURE — 83735 ASSAY OF MAGNESIUM: CPT | Performed by: STUDENT IN AN ORGANIZED HEALTH CARE EDUCATION/TRAINING PROGRAM

## 2025-02-28 PROCEDURE — 80048 BASIC METABOLIC PNL TOTAL CA: CPT | Performed by: STUDENT IN AN ORGANIZED HEALTH CARE EDUCATION/TRAINING PROGRAM

## 2025-02-28 RX ORDER — SULFAMETHOXAZOLE AND TRIMETHOPRIM 800; 160 MG/1; MG/1
1 TABLET ORAL 2 TIMES DAILY
Start: 2025-02-28 | End: 2025-03-04

## 2025-02-28 RX ADMIN — PANTOPRAZOLE SODIUM 40 MG: 40 TABLET, DELAYED RELEASE ORAL at 10:02

## 2025-02-28 RX ADMIN — VERAPAMIL HYDROCHLORIDE 180 MG: 180 TABLET ORAL at 10:02

## 2025-02-28 RX ADMIN — CEFTRIAXONE SODIUM 1 G: 1 INJECTION, POWDER, FOR SOLUTION INTRAMUSCULAR; INTRAVENOUS at 12:02

## 2025-02-28 RX ADMIN — OXYBUTYNIN CHLORIDE 15 MG: 5 TABLET, EXTENDED RELEASE ORAL at 10:02

## 2025-02-28 NOTE — PT/OT/SLP EVAL
Physical Therapy Evaluation    Patient Name:  Whitley Millard   MRN:  [id number]    Recommendations:     Discharge Recommendations:     Discharge Equipment Recommendations:     Barriers to discharge: None    Assessment:     Whitley Millard is a 89 y.o. female admitted with a medical diagnosis of <principal problem not specified>.  She presents with the following impairments/functional limitations:   Patient with generalized weakness from bedrest while recovering from fracture LE. Patient also with fear of falling. Will increase activity and gait daily. Will need continued swingbed at dc. .    Rehab Prognosis: Good; patient would benefit from acute skilled PT services to address these deficits and reach maximum level of function.    Recent Surgery: * No surgery found *      Plan:     During this hospitalization, patient to be seen   to address the identified rehab impairments via   and progress toward the following goals:    Plan of Care Expires:  03/28/25    Subjective     Chief Complaint: fear of falling  Patient/Family Comments/goals: Patient states she is scared of falling.   Pain/Comfort:       Patients cultural, spiritual, Rastafarian conflicts given the current situation: no    Living Environment:  Lives alone with sitters  Prior to admission, patients level of function was supervised household ambulation with walker.  Equipment used at home:  .  DME owned (not currently used): rolling walker.  Upon discharge, patient will have assistance from son.    Objective:     Communicated with nurse prior to session.  Patient found supine with peripheral IV  upon PT entry to room.    General Precautions: Standard, fall  Orthopedic Precautions:    Braces:    Respiratory Status: Room air    Exams:  Cognitive Exam:  Patient is oriented to Person and Place  RLE ROM: WFL  RLE Strength: 4/5  LLE ROM: WFL  LLE Strength: 4/5    Functional Mobility:  Bed Mobility:     Supine to Sit: moderate assistance  Sit to Supine: moderate  assistance  Transfers:     Sit to Stand:  moderate assistance with rolling walker  Gait: ambulated 3 steps with rolling walker mod assist, fear of falling      AM-PAC 6 CLICK MOBILITY  Total Score:        Treatment & Education:  Tx plan    Patient left HOB elevated with call button in reach.    GOALS:   Multidisciplinary Problems       Physical Therapy Goals          Problem: Physical Therapy    Goal Priority Disciplines Outcome Interventions   Physical Therapy Goal     PT, PT/OT Progressing    Description: Short Term Goals  Ambulate CGA - 10 feet with rolling walker assistive device.   Supine to sit minimum  Sit to stand minimum  SPT minimum  5. Independent with HEP    Long Term Goals  Ambulate CGA - 20 feet with rolling walker assistive device.   Supine to sit stand-by  Sit to stand stand-by  SPT stand-by  Needed equipment for home.                              DME Justifications:  No DME recommended requiring DME justifications    History:     Past Medical History:   Diagnosis Date    Cholelithiases     Essential hypertension 08/26/2021    GERD (gastroesophageal reflux disease)     Macular degeneration     Osteoarthrosis     Pulmonary hypertension 2021    RVSP  37 mmHg    Ureteral stone with hydronephrosis        Past Surgical History:   Procedure Laterality Date    CARPAL TUNNEL RELEASE Bilateral     FOOT SURGERY Left     TOTAL HIP ARTHROPLASTY Right     TOTAL KNEE ARTHROPLASTY Left     URETERORENOSCOPY Left 12/15/2021    Procedure: URETERORENOSCOPY WITH LASER LITHOTRIPSY AND INSERTION OF URETERAL STENT;  Surgeon: Hernando Hanson Jr., MD;  Location: [facility];  Service: Urology;  Laterality: Left;       Time Tracking:     PT Received On: 02/27/25  PT Start Time: 1530     PT Stop Time: 1555  PT Total Time (min): 25 min     Billable Minutes: Evaluation 20 02/28/2025

## 2025-02-28 NOTE — PLAN OF CARE
Problem: Physical Therapy  Goal: Physical Therapy Goal  Description: Short Term Goals  Ambulate CGA - 10 feet with rolling walker assistive device.   Supine to sit minimum  Sit to stand minimum  SPT minimum  5. Independent with HEP    Long Term Goals  Ambulate CGA - 20 feet with rolling walker assistive device.   Supine to sit stand-by  Sit to stand stand-by  SPT stand-by  Needed equipment for home.         Outcome: Progressing

## 2025-02-28 NOTE — ASSESSMENT & PLAN NOTE
Patient's blood pressure range in the last 24 hours was controlled.     The patient's inpatient anti-hypertensive regimen is listed below:  Current Antihypertensives  verapamiL CR tablet 180 mg, Daily, Oral    Continue medications per discharge medication reconciliation. Follow up with primary care.

## 2025-02-28 NOTE — PT/OT/SLP PROGRESS
Physical Therapy Treatment    Patient Name:  Whitley Millard   MRN:  [id number]    Recommendations:     Discharge Recommendations:    Discharge Equipment Recommendations:    Barriers to discharge: Decreased caregiver support    Assessment:     Whitley Millard is a 89 y.o. female admitted with a medical diagnosis of UTI (urinary tract infection).  She presents with the following impairments/functional limitations:   Patient with less verbalization today. Able to stand with assist and transfer to chair. Not able to take steps today due to fear of falling. .    Rehab Prognosis: Fair; patient would benefit from acute skilled PT services to address these deficits and reach maximum level of function.    Recent Surgery: * No surgery found *      Plan:     During this hospitalization, patient to be seen   to address the identified rehab impairments via   and progress toward the following goals:    Plan of Care Expires:  03/28/25    Subjective     Chief Complaint: fear of falling  Patient/Family Comments/goals: Not much verbalization today  Pain/Comfort:  Pain Rating 1: 0/10      Objective:     Communicated with nurse prior to session.  Patient found HOB elevated with peripheral IV upon PT entry to room.     General Precautions: Standard, fall  Orthopedic Precautions:    Braces:    Respiratory Status: Room air     Functional Mobility:  Bed Mobility:     Supine to Sit: moderate assistance  Sit to Supine: moderate assistance  Transfers:     Sit to Stand:  moderate assistance with no AD  Bed to Chair: moderate assistance with  no AD  using  Stand Pivot  Gait: spt bed to chair. Too fearful to take steps      AM-PAC 6 CLICK MOBILITY  Turning over in bed (including adjusting bedclothes, sheets and blankets)?: 2  Sitting down on and standing up from a chair with arms (e.g., wheelchair, bedside commode, etc.): 2  Moving from lying on back to sitting on the side of the bed?: 2  Moving to and from a bed to a chair (including a  wheelchair)?: 2  Need to walk in hospital room?: 2  Climbing 3-5 steps with a railing?: 2  Basic Mobility Total Score: 12       Treatment & Education:  Sat eob x 5 minutes mod assist  Sat up in chair for lunch    Patient left up in chair with call button in reach..    GOALS:   Multidisciplinary Problems       Physical Therapy Goals          Problem: Physical Therapy    Goal Priority Disciplines Outcome Interventions   Physical Therapy Goal     PT, PT/OT Progressing    Description: Short Term Goals  Ambulate CGA - 10 feet with rolling walker assistive device.   Supine to sit minimum  Sit to stand minimum  SPT minimum  5. Independent with HEP    Long Term Goals  Ambulate CGA - 20 feet with rolling walker assistive device.   Supine to sit stand-by  Sit to stand stand-by  SPT stand-by  Needed equipment for home.                              DME Justifications:  No DME recommended requiring DME justifications    Time Tracking:     PT Received On: 02/28/25  PT Start Time: 1130     PT Stop Time: 1155  PT Total Time (min): 25 min     Billable Minutes: Therapeutic Activity 25    Treatment Type: Evaluation  PT/PTA: PT           02/28/2025

## 2025-02-28 NOTE — PLAN OF CARE
CM at bedside to obtain IM.  CM will continue to follow for DC needs as they arise.    1300- Packet started and taken to 6N and given to patient's nurse.  CM will continue to follow for DC needs as they arise.

## 2025-02-28 NOTE — PT/OT/SLP PROGRESS
"Occupational Therapy   Treatment    Name: Whitley Millard  MRN: [id number]  Admitting Diagnosis:  UTI (urinary tract infection)       Recommendations:     Discharge Recommendations: Moderate Intensity Therapy  Discharge Equipment Recommendations:  to be determined by next level of care  Barriers to discharge:  None    Assessment:     Whitley Millard is a 89 y.o. female with a medical diagnosis of UTI (urinary tract infection).  She presents with alert and agreeable to treatment. Performance deficits affecting function are weakness, impaired endurance, impaired self care skills, impaired functional mobility, gait instability, impaired cognition, impaired balance, decreased safety awareness.     Rehab Prognosis:  Good; patient would benefit from acute skilled OT services to address these deficits and reach maximum level of function.       Plan:     Patient to be seen 5 x/week to address the above listed problems via self-care/home management, therapeutic activities, therapeutic exercises  Plan of Care Expires: 04/03/25  Plan of Care Reviewed with: patient    Subjective     Chief Complaint: UTI (urinary tract infection)   Patient/Family Comments/goals: "I would like a fresh cup of coffee."  Pain/Comfort:  Pain Rating 1: 0/10  Pain Rating Post-Intervention 1: 0/10    Objective:     Communicated with: MARTIN Rodriguez prior to session.  Patient found HOB elevated with peripheral IV upon OT entry to room.    General Precautions: Standard, fall    Orthopedic Precautions:N/A  Braces: N/A  Respiratory Status: Room air     Occupational Performance:     Bed Mobility:    Patient completed Scooting/Bridging with minimum assistance  Patient completed Supine to Sit with minimum assistance and increased time and effort  Patient completed Sit to Supine with moderate assistance     Functional Mobility/Transfers:  Patient completed Sit <> Stand Transfer with moderate assistance, maximal assistance, and of 2 persons  with  hand-held assist "   Functional Mobility: Pt was very fearful and resisted standing. Pt performed a total (A) SPT to chair.    Activities of Daily Living:  NT      AMPAC 6 Click ADL: 14    Treatment & Education:  Pt sat up EOB for 15 min with min to mod(A) due to posterior lean.   Pt performed (R) elbow flexion with 2# db, (L) elbow flexion with 2# db each x 15 reps.  Pt performed forward reach with 1# db to improve sitting balance for 10 reps of each.  Pt performed weight shifts with CGA.  Pt returned to bed due to c/o pure wick being uncomfortable while sitting. Pt then sat up and attempted standing but was very fearful and pushing posteriorly    Patient left up in chair with all lines intact, call button in reach, and chair alarm on    GOALS:   Multidisciplinary Problems       Occupational Therapy Goals          Problem: Occupational Therapy    Goal Priority Disciplines Outcome Interventions   Occupational Therapy Goal     OT, PT/OT Progressing    Description: STG:  Pt will perform grooming with setup  Pt will bathe with min a  Pt will perform UE dressing with (I)  Pt will perform LE dressing with Min a with AD as needed  Pt will sit EOB x 15 min with SBA  Pt will transfer bed/chair/bsc with Min a  Pt will tolerate 20 minutes of tx without fatigue      LT.Restore to max I with self care and mobility.                              Time Tracking:     OT Date of Treatment: 25  OT Start Time: 1103  OT Stop Time: 1132  OT Total Time (min): 29 min    Billable Minutes:Therapeutic Activity 25 min    OT/ELISHA: OT          2025

## 2025-02-28 NOTE — ASSESSMENT & PLAN NOTE
Controlled. Continue medications per discharge medication reconciliation. Follow up with primary care.

## 2025-02-28 NOTE — DISCHARGE SUMMARY
[facility] Hospital Medicine  Discharge Summary      Patient Name: Whitley Millard  MRN: [id number]  FABRIZIO: [id number]  Patient Class: IP- Inpatient  Admission Date: 2/26/2025  Hospital Length of Stay: 1 days  Discharge Date and Time:  02/28/2025 12:10 PM  Attending Physician: Maggy Arciniega DO   Discharging Provider: Maggy Arciniega DO  Primary Care Provider: Rishi Appiah MD    Primary Care Team: Networked reference to record PCT     HPI:        Ms. Millard is a 89-year-old white female with a past medical history of chronic UTI, nephrolithiasis, hypertension, macular degeneration who presents to the hospital from her swing bed facility with increased confusion.  She had a UTI approximately 2 weeks ago that was fully treated.  Urine in the ED today shows evidence of recurrent UTI.  She has had this happen in the past which was due to renal stones.  Subsequently had lithotripsy and passage of stones and resolution of recurrent UTIs.  She is very pleasant today but confused.  She has no complaints of the time of my interview.  Workup was unremarkable beyond likely UTI.          * No surgery found *      Hospital Course:   2/27 still confused but seems better     Goals of Care Treatment Preferences:  Code Status: Full Code      SDOH Screening:  The patient was screened for utility difficulties, food insecurity, transport difficulties, housing insecurity, and interpersonal safety and there were no concerns identified this admission.     Consults:     UTI (urinary tract infection)  Pan-sensitive E coli in urine 2 weeks ago which was fully treated per report.     UA this admission consistent with infection. Urine culture again with pan-sensitive E coli. Patient received ceftriaxone for three days while inpatient. Discharge with Bactrim to complete 7-day course.             Essential hypertension  Patient's blood pressure range in the last 24 hours was controlled.     The  patient's inpatient anti-hypertensive regimen is listed below:  Current Antihypertensives  verapamiL CR tablet 180 mg, Daily, Oral    Continue medications per discharge medication reconciliation. Follow up with primary care.      GERD (gastroesophageal reflux disease)  Controlled. Continue medications per discharge medication reconciliation. Follow up with primary care.          Final Active Diagnoses:    Diagnosis Date Noted POA    UTI (urinary tract infection) [N39.0] 02/26/2025 Yes    GERD (gastroesophageal reflux disease) [K21.9] 08/26/2021 Yes    Essential hypertension [I10] 08/26/2021 Yes      Problems Resolved During this Admission:    Diagnosis Date Noted Date Resolved POA    Encephalopathy, metabolic [G93.41] 02/26/2025 02/28/2025 Yes    Thrombocytopenia [D69.6] 08/26/2021 02/28/2025 Yes       Discharged Condition: stable    Disposition:     Follow Up:   Follow-up Information       [place]    Specialty: Skilled Nursing Facility  Contact information:  [address] [place] [id number] [phone]                         Patient Instructions:   No discharge procedures on file.    Significant Diagnostic Studies: N/A    Pending Diagnostic Studies:       None           Medications:  Reconciled Home Medications:      Medication List        START taking these medications      sulfamethoxazole-trimethoprim 800-160mg 800-160 mg Tab  Commonly known as: BACTRIM DS  Take 1 tablet by mouth 2 (two) times daily. First dose today (2/28) in the evening. for 8 doses            CONTINUE taking these medications      acetaminophen 325 MG tablet  Commonly known as: TYLENOL  Take 650 mg by mouth every 6 (six) hours as needed for Pain (and/or fever).     busPIRone 5 MG Tab  Commonly known as: BUSPAR  Take 5 mg by mouth 2 (two) times daily.     cetirizine 10 MG tablet  Commonly known as: ZYRTEC  Take 10 mg by mouth once daily.     esomeprazole 40 MG capsule  Commonly known as:  NEXIUM  TAKE 1 CAPSULE BY MOUTH EVERY DAY BEFORE BREAKFAST     Lactobacillus rhamnosus GG 10 billion cell capsule  Commonly known as: CULTURELLE  Take 1 capsule by mouth 2 (two) times a day.     magnesium oxide 400 mg (241.3 mg magnesium) tablet  Commonly known as: MAG-OX  Take 400 mg by mouth once daily.     methenamine 1 gram Tab  Commonly known as: HIPREX  TAKE 1 TABLET BY MOUTH TWICE DAILY     multivitamin per tablet  Commonly known as: THERAGRAN  Take 1 tablet by mouth once daily.     ondansetron 4 MG tablet  Commonly known as: ZOFRAN  Take 4 mg by mouth every 6 (six) hours as needed for Nausea.     oxybutynin 15 MG Tr24  Commonly known as: DITROPAN XL  Take 15 mg by mouth once daily.     polyethylene glycol 17 gram Pwpk  Commonly known as: GLYCOLAX  Take 17 g by mouth daily as needed for Constipation.     potassium chloride SA 20 MEQ tablet  Commonly known as: K-DUR,KLOR-CON  Take 20 mEq by mouth once daily.     senna-docusate 8.6-50 mg 8.6-50 mg per tablet  Commonly known as: PERICOLACE  Take 2 tablets by mouth every evening.     sertraline 25 MG tablet  Commonly known as: ZOLOFT  Take 25 mg by mouth once daily.     verapamiL 180 MG CR tablet  Commonly known as: CALAN-SR  Take 180 mg by mouth once daily.     VOLTAREN ARTHRITIS PAIN 1 % Gel  Generic drug: diclofenac sodium  Apply 2 g topically once daily. Apply to right knee topically TID for right knee pain              Indwelling Lines/Drains at time of discharge:   Lines/Drains/Airways       None                   Time spent on the discharge of patient: 40 minutes         Maggy Arciniega DO  Department of Hospital Medicine  [facility]

## 2025-02-28 NOTE — ASSESSMENT & PLAN NOTE
Pan-sensitive E coli in urine 2 weeks ago which was fully treated per report.     UA this admission consistent with infection. Urine culture again with pan-sensitive E coli. Patient received ceftriaxone for three days while inpatient. Discharge with Bactrim to complete 7-day course.

## 2025-02-28 NOTE — ASSESSMENT & PLAN NOTE
Resolved. Likely secondary to acute infection (UTI).     -----  On admission:     Patient has acute metabolic encephalopathy that is secondary to Sepsis/Infective. Patient's current mental status is Awake, Alert, Confused. Patient's baseline mental status is. awake and alert; oriented to person, place, and time Evaluation and for underlying cause(s) is underway and inclusive of Blood Chemistries. Will monitor neuro checks carefully, avoid narcotics and benzos that will exacerbate agitation, and use PRN medications for controls of behavior for self harm.

## 2025-02-28 NOTE — NURSING TRANSFER
Nursing Transfer Note      2/28/2025   1515    Nurse giving handoff: MARTIN Rodriguez  Nurse receiving handoff: MARTIN Winter    Reason patient is being transferred: SNF    Transfer To: [facility] [place]    Transfer via wheelchair    Transfer with no devices.    Transported by [facility] resident van.     Transfer Vital Signs:  Blood Pressure: 95/68   Heart Rate:106  O2:96  Temperature:99.2  Respirations:16    Telemetry: No   Order for Tele Monitor? No    Additional Lines: NO    Medicines sent: No     Any special needs or follow-up needed: Discharge sent to SNF     Patient belongings transferred with patient: Yes    Chart send with patient: Yes    Notified: Son, Daughter in Law

## 2025-03-03 NOTE — PLAN OF CARE
[facility]  Discharge Final Note    Primary Care Provider: Rishi Appiah MD    Expected Discharge Date: 2/28/2025    Final Discharge Note (most recent)       Final Note - 03/03/25 0826          Final Note    Assessment Type Final Discharge Note     Anticipated Discharge Disposition Skilled Nursing Facility     What phone number can be called within the next 1-3 days to see how you are doing after discharge? [phone]        Post-Acute Status    Post-Acute Authorization Placement     Post-Acute Placement Status Set-up Complete/Auth obtained     Coverage Medicare A & B     Patient choice form signed by patient/caregiver List with quality metrics by geographic area provided;List from CMS Compare;List from System Post-Acute Care     Discharge Delays None known at this time                     Important Message from Medicare  Important Message from Medicare regarding Discharge Appeal Rights: Explained to patient/caregiver, Signed/date by patient/caregiver     Date IMM was signed: 02/28/25  Time IMM was signed: 1105    After-discharge care                Destination       [facility]   Service: Skilled Nursing    [address] [facility] [place] [address]   Phone: [phone]                     Patient will be discharged back to [facility] at [facility] today.  DC information faxed.  IM current.  Facility aware and family notified.  No further needs.

## 2025-03-05 ENCOUNTER — HOSPITAL ENCOUNTER (OUTPATIENT)
Dept: RADIOLOGY | Facility: HOSPITAL | Age: OVER 89
Discharge: HOME | End: 2025-03-05
Attending: ORTHOPAEDIC SURGERY
Payer: MEDICARE

## 2025-03-05 ENCOUNTER — OFFICE VISIT (OUTPATIENT)
Dept: ORTHOPEDICS | Facility: CLINIC | Age: OVER 89
End: 2025-03-05
Payer: MEDICARE

## 2025-03-05 VITALS
BODY MASS INDEX: 31.32 KG/M2 | WEIGHT: 188 LBS | TEMPERATURE: 99 F | SYSTOLIC BLOOD PRESSURE: 125 MMHG | DIASTOLIC BLOOD PRESSURE: 76 MMHG | HEART RATE: 104 BPM | HEIGHT: 65 IN | OXYGEN SATURATION: 95 %

## 2025-03-05 DIAGNOSIS — Z96.659 PERIPROSTHETIC FRACTURE OF PROXIMAL END OF TIBIA, INITIAL ENCOUNTER: ICD-10-CM

## 2025-03-05 DIAGNOSIS — M97.8XXA PERIPROSTHETIC FRACTURE OF PROXIMAL END OF TIBIA, INITIAL ENCOUNTER: Primary | ICD-10-CM

## 2025-03-05 DIAGNOSIS — M97.8XXA PERIPROSTHETIC FRACTURE OF PROXIMAL END OF TIBIA, INITIAL ENCOUNTER: ICD-10-CM

## 2025-03-05 DIAGNOSIS — Z96.659 PERIPROSTHETIC FRACTURE OF PROXIMAL END OF TIBIA, INITIAL ENCOUNTER: Primary | ICD-10-CM

## 2025-03-05 LAB
OHS QRS DURATION: 78 MS
OHS QTC CALCULATION: 447 MS

## 2025-03-05 PROCEDURE — 99999 PR PBB SHADOW E&M-EST. PATIENT-LVL V: CPT | Mod: PBBFAC,,, | Performed by: ORTHOPAEDIC SURGERY

## 2025-03-05 PROCEDURE — 99024 POSTOP FOLLOW-UP VISIT: CPT | Mod: ,,, | Performed by: ORTHOPAEDIC SURGERY

## 2025-03-05 PROCEDURE — 73590 X-RAY EXAM OF LOWER LEG: CPT | Mod: 26,LT,, | Performed by: ORTHOPAEDIC SURGERY

## 2025-03-05 PROCEDURE — 73590 X-RAY EXAM OF LOWER LEG: CPT | Mod: TC,LT

## 2025-03-05 PROCEDURE — 99215 OFFICE O/P EST HI 40 MIN: CPT | Mod: PBBFAC,25 | Performed by: ORTHOPAEDIC SURGERY

## 2025-03-05 NOTE — PROGRESS NOTES
Patient is here for left tib-fib fracture.  X-rays show she has healed the fracture.  She is walking with a walker.  Weightbear as tolerates.  Minimal pain.  Little bit of soreness over pes bursa.  Let her weightbear as tolerates with therapy.  I will follow her up as needed.

## 2025-03-05 NOTE — PROGRESS NOTES
Radiology Interpretation        Patient Name: Whitley Millard  Date: 3/5/2025  YOB: 1933  MRN# [id number]        ORDERING DIAGNOSIS:    Encounter Diagnosis   Name Primary?    Periprosthetic fracture of proximal end of tibia, initial encounter Yes      Two views left tib-fib skeletally mature individual total knee arthroplasty in place there is a healed fracture of the proximal tibia and fibula.  No displacement.  No bony lesions.  Impression healed fracture proximal tibia and fibula on left total knee arthroplasty in place no loosening               Mihai Nielsen MD

## 2025-04-25 ENCOUNTER — PATIENT MESSAGE (OUTPATIENT)
Dept: NEUROLOGY | Facility: CLINIC | Age: OVER 89
End: 2025-04-25
Payer: MEDICARE

## (undated) DEVICE — GLIDEWIRE STRAIGHT .038MMX150CM ZIPWIRE

## (undated) DEVICE — IRRIGATION Y-TYPE SET

## (undated) DEVICE — Device

## (undated) DEVICE — GLIDEWIRE ANGLED .038MM ZIPWIRE

## (undated) DEVICE — CATH URETHRAL AXXCESS 6FR

## (undated) DEVICE — ENDOSCOPIC SURESEAL II VALVE

## (undated) DEVICE — EXTRACTOR STONE TIPLESS 1.5FR X 115CM

## (undated) DEVICE — CDS CYSTO

## (undated) DEVICE — BAG DRAINAGE UROLOGY

## (undated) DEVICE — GOWN SURGICAL STERILE LEVEL 3 / XX-LARGE

## (undated) DEVICE — GLOVE SURGICAL PROTEXIS PI BLUE SIZE 6.5

## (undated) DEVICE — FIBER LASER MOSES 200

## (undated) DEVICE — SOL IRRIGATION SALINE 3000ML BAG

## (undated) DEVICE — GLOVE SURGICAL PROTEXIS PI CLASSIC SIZE 7.5